# Patient Record
Sex: MALE | Race: NATIVE HAWAIIAN OR OTHER PACIFIC ISLANDER | NOT HISPANIC OR LATINO | URBAN - METROPOLITAN AREA
[De-identification: names, ages, dates, MRNs, and addresses within clinical notes are randomized per-mention and may not be internally consistent; named-entity substitution may affect disease eponyms.]

---

## 2022-09-27 ENCOUNTER — EMERGENCY (EMERGENCY)
Facility: HOSPITAL | Age: 82
LOS: 1 days | Discharge: HOME | End: 2022-09-27
Attending: EMERGENCY MEDICINE | Admitting: EMERGENCY MEDICINE
Payer: MEDICARE

## 2022-09-27 VITALS
OXYGEN SATURATION: 96 % | SYSTOLIC BLOOD PRESSURE: 121 MMHG | TEMPERATURE: 98 F | RESPIRATION RATE: 18 BRPM | DIASTOLIC BLOOD PRESSURE: 56 MMHG | HEART RATE: 80 BPM

## 2022-09-27 DIAGNOSIS — I10 ESSENTIAL (PRIMARY) HYPERTENSION: ICD-10-CM

## 2022-09-27 DIAGNOSIS — E11.9 TYPE 2 DIABETES MELLITUS WITHOUT COMPLICATIONS: ICD-10-CM

## 2022-09-27 DIAGNOSIS — Z20.822 CONTACT WITH AND (SUSPECTED) EXPOSURE TO COVID-19: ICD-10-CM

## 2022-09-27 DIAGNOSIS — R79.89 OTHER SPECIFIED ABNORMAL FINDINGS OF BLOOD CHEMISTRY: ICD-10-CM

## 2022-09-27 LAB
ALBUMIN SERPL ELPH-MCNC: 2.8 G/DL — LOW (ref 3.3–5)
ALBUMIN SERPL ELPH-MCNC: 3 G/DL — LOW (ref 3.3–5)
ALP SERPL-CCNC: 67 U/L — SIGNIFICANT CHANGE UP (ref 40–120)
ALP SERPL-CCNC: 69 U/L — SIGNIFICANT CHANGE UP (ref 40–120)
ALT FLD-CCNC: <5 U/L — LOW (ref 10–45)
ALT FLD-CCNC: SIGNIFICANT CHANGE UP (ref 10–45)
ANION GAP SERPL CALC-SCNC: 15 MMOL/L — SIGNIFICANT CHANGE UP (ref 5–17)
ANION GAP SERPL CALC-SCNC: 18 MMOL/L — HIGH (ref 5–17)
AST SERPL-CCNC: 13 U/L — SIGNIFICANT CHANGE UP (ref 10–40)
AST SERPL-CCNC: SIGNIFICANT CHANGE UP (ref 10–40)
BASE EXCESS BLDV CALC-SCNC: 4.2 MMOL/L — HIGH (ref -2–3)
BASOPHILS # BLD AUTO: 0.07 K/UL — SIGNIFICANT CHANGE UP (ref 0–0.2)
BASOPHILS NFR BLD AUTO: 0.9 % — SIGNIFICANT CHANGE UP (ref 0–2)
BILIRUB SERPL-MCNC: 0.5 MG/DL — SIGNIFICANT CHANGE UP (ref 0.2–1.2)
BILIRUB SERPL-MCNC: 0.5 MG/DL — SIGNIFICANT CHANGE UP (ref 0.2–1.2)
BUN SERPL-MCNC: 26 MG/DL — HIGH (ref 7–23)
BUN SERPL-MCNC: 27 MG/DL — HIGH (ref 7–23)
CA-I SERPL-SCNC: 1.55 MMOL/L — HIGH (ref 1.15–1.33)
CALCIUM SERPL-MCNC: 11.3 MG/DL — HIGH (ref 8.4–10.5)
CALCIUM SERPL-MCNC: 11.5 MG/DL — HIGH (ref 8.4–10.5)
CHLORIDE SERPL-SCNC: 101 MMOL/L — SIGNIFICANT CHANGE UP (ref 96–108)
CHLORIDE SERPL-SCNC: 103 MMOL/L — SIGNIFICANT CHANGE UP (ref 96–108)
CO2 BLDV-SCNC: 30.6 MMOL/L — HIGH (ref 22–26)
CO2 SERPL-SCNC: 23 MMOL/L — SIGNIFICANT CHANGE UP (ref 22–31)
CO2 SERPL-SCNC: 25 MMOL/L — SIGNIFICANT CHANGE UP (ref 22–31)
CREAT SERPL-MCNC: 2.09 MG/DL — HIGH (ref 0.5–1.3)
CREAT SERPL-MCNC: 2.1 MG/DL — HIGH (ref 0.5–1.3)
EGFR: 31 ML/MIN/1.73M2 — LOW
EGFR: 31 ML/MIN/1.73M2 — LOW
EOSINOPHIL # BLD AUTO: 0.41 K/UL — SIGNIFICANT CHANGE UP (ref 0–0.5)
EOSINOPHIL NFR BLD AUTO: 5.2 % — SIGNIFICANT CHANGE UP (ref 0–6)
GAS PNL BLDV: 138 MMOL/L — SIGNIFICANT CHANGE UP (ref 136–145)
GAS PNL BLDV: SIGNIFICANT CHANGE UP
GLUCOSE SERPL-MCNC: 114 MG/DL — HIGH (ref 70–99)
GLUCOSE SERPL-MCNC: 115 MG/DL — HIGH (ref 70–99)
HCO3 BLDV-SCNC: 29 MMOL/L — SIGNIFICANT CHANGE UP (ref 22–29)
HCT VFR BLD CALC: 31.9 % — LOW (ref 39–50)
HGB BLD-MCNC: 10.2 G/DL — LOW (ref 13–17)
LYMPHOCYTES # BLD AUTO: 0.56 K/UL — LOW (ref 1–3.3)
LYMPHOCYTES # BLD AUTO: 7 % — LOW (ref 13–44)
MANUAL SMEAR VERIFICATION: SIGNIFICANT CHANGE UP
MCHC RBC-ENTMCNC: 27.8 PG — SIGNIFICANT CHANGE UP (ref 27–34)
MCHC RBC-ENTMCNC: 32 GM/DL — SIGNIFICANT CHANGE UP (ref 32–36)
MCV RBC AUTO: 86.9 FL — SIGNIFICANT CHANGE UP (ref 80–100)
MONOCYTES # BLD AUTO: 0.97 K/UL — HIGH (ref 0–0.9)
MONOCYTES NFR BLD AUTO: 12.2 % — SIGNIFICANT CHANGE UP (ref 2–14)
NEUTROPHILS # BLD AUTO: 5.94 K/UL — SIGNIFICANT CHANGE UP (ref 1.8–7.4)
NEUTROPHILS NFR BLD AUTO: 73 % — SIGNIFICANT CHANGE UP (ref 43–77)
NEUTS BAND # BLD: 1.7 % — SIGNIFICANT CHANGE UP (ref 0–8)
PCO2 BLDV: 44 MMHG — SIGNIFICANT CHANGE UP (ref 42–55)
PH BLDV: 7.43 — SIGNIFICANT CHANGE UP (ref 7.32–7.43)
PLAT MORPH BLD: NORMAL — SIGNIFICANT CHANGE UP
PLATELET # BLD AUTO: 327 K/UL — SIGNIFICANT CHANGE UP (ref 150–400)
PO2 BLDV: 44 MMHG — SIGNIFICANT CHANGE UP (ref 25–45)
POTASSIUM BLDV-SCNC: 3.9 MMOL/L — SIGNIFICANT CHANGE UP (ref 3.5–5.1)
POTASSIUM SERPL-MCNC: SIGNIFICANT CHANGE UP (ref 3.5–5.3)
POTASSIUM SERPL-MCNC: SIGNIFICANT CHANGE UP (ref 3.5–5.3)
POTASSIUM SERPL-SCNC: SIGNIFICANT CHANGE UP (ref 3.5–5.3)
POTASSIUM SERPL-SCNC: SIGNIFICANT CHANGE UP (ref 3.5–5.3)
PROT SERPL-MCNC: 6.8 G/DL — SIGNIFICANT CHANGE UP (ref 6–8.3)
PROT SERPL-MCNC: 7.2 G/DL — SIGNIFICANT CHANGE UP (ref 6–8.3)
RBC # BLD: 3.67 M/UL — LOW (ref 4.2–5.8)
RBC # FLD: 14.4 % — SIGNIFICANT CHANGE UP (ref 10.3–14.5)
RBC BLD AUTO: NORMAL — SIGNIFICANT CHANGE UP
SAO2 % BLDV: 73.1 % — SIGNIFICANT CHANGE UP (ref 67–88)
SARS-COV-2 RNA SPEC QL NAA+PROBE: NEGATIVE — SIGNIFICANT CHANGE UP
SODIUM SERPL-SCNC: 142 MMOL/L — SIGNIFICANT CHANGE UP (ref 135–145)
SODIUM SERPL-SCNC: 143 MMOL/L — SIGNIFICANT CHANGE UP (ref 135–145)
WBC # BLD: 7.95 K/UL — SIGNIFICANT CHANGE UP (ref 3.8–10.5)
WBC # FLD AUTO: 7.95 K/UL — SIGNIFICANT CHANGE UP (ref 3.8–10.5)

## 2022-09-27 PROCEDURE — 84132 ASSAY OF SERUM POTASSIUM: CPT

## 2022-09-27 PROCEDURE — 99283 EMERGENCY DEPT VISIT LOW MDM: CPT

## 2022-09-27 PROCEDURE — 82803 BLOOD GASES ANY COMBINATION: CPT

## 2022-09-27 PROCEDURE — 93010 ELECTROCARDIOGRAM REPORT: CPT

## 2022-09-27 PROCEDURE — 80053 COMPREHEN METABOLIC PANEL: CPT

## 2022-09-27 PROCEDURE — 84295 ASSAY OF SERUM SODIUM: CPT

## 2022-09-27 PROCEDURE — 36415 COLL VENOUS BLD VENIPUNCTURE: CPT

## 2022-09-27 PROCEDURE — 82330 ASSAY OF CALCIUM: CPT

## 2022-09-27 PROCEDURE — 87635 SARS-COV-2 COVID-19 AMP PRB: CPT

## 2022-09-27 PROCEDURE — 85025 COMPLETE CBC W/AUTO DIFF WBC: CPT

## 2022-09-27 PROCEDURE — 93005 ELECTROCARDIOGRAM TRACING: CPT

## 2022-09-27 PROCEDURE — 99285 EMERGENCY DEPT VISIT HI MDM: CPT

## 2022-09-27 RX ORDER — SODIUM CHLORIDE 9 MG/ML
1000 INJECTION INTRAMUSCULAR; INTRAVENOUS; SUBCUTANEOUS ONCE
Refills: 0 | Status: COMPLETED | OUTPATIENT
Start: 2022-09-27 | End: 2022-09-27

## 2022-09-27 RX ADMIN — SODIUM CHLORIDE 1000 MILLILITER(S): 9 INJECTION INTRAMUSCULAR; INTRAVENOUS; SUBCUTANEOUS at 23:16

## 2022-09-27 NOTE — ED ADULT NURSE NOTE - ED CARDIAC CAPILLARY REFILL
----- Message from Tavo Bhatt sent at 5/4/2020  2:21 PM CDT -----  Contact: patient  Type: Needs Medical Advice  Who Called:  Patient  Symptoms (please be specific):    How long has patient had these symptoms:    Pharmacy name and phone #:    Best Call Back Number:   Additional Information: requesting paperwork be email to her regarding returning back to work on 05/11 and what she is being treated for [email or web address] requesting a call back patient would like virtual visit,first available was at 1:00pm on 05/11  
Letter printed for MD signature to be emailed.   
2 seconds or less

## 2022-09-28 VITALS
SYSTOLIC BLOOD PRESSURE: 118 MMHG | HEART RATE: 77 BPM | DIASTOLIC BLOOD PRESSURE: 60 MMHG | OXYGEN SATURATION: 97 % | RESPIRATION RATE: 19 BRPM

## 2022-09-28 NOTE — ED PROVIDER NOTE - PATIENT PORTAL LINK FT
You can access the FollowMyHealth Patient Portal offered by [facility] by registering at the following website: [email or web address]. By joining [facility] [facility]’s FollowMyHealth portal, you will also be able to view your health information using other applications (apps) compatible with our system.

## 2022-09-28 NOTE — ED PROVIDER NOTE - OBJECTIVE STATEMENT
81 yr old male, history of htn, dm, presents to the Emergency Department for abnormal lab result. sons at bedside translate.  pt saw pmd for routine visit, had blood work checked. was called today by office and told to come to the ed due to low kidney function. pt without symptoms. no hx of ckd. has never been told abnormal labs before.   no chest pain, cough, SOB, abd pain, n/v/d, urinary symptoms, change in urinary output, leg swelling. has been eating / drinking ok.

## 2022-09-28 NOTE — ED PROVIDER NOTE - PROGRESS NOTE DETAILS
creatinine 2.10 - no old for comparison  anemic to 10.2 / hct 31.9, again no old for comparison  ecg ok. labs otherwise ok.  rpt labs (w/o intervention, doing for K+ as initial hemolyzed) w creatinine 2.09.  given liter of fluids. pt without complaints. unclear if acute or ongoing issue but given no electrolyte abnormalities and no symptoms do not feel pt warrants admission at this time.     has pmd to follow up with - sons would prefer to get established w someone through [facility] so will give referral information.     All results reviewed with the patient verbally. Discharge plan and return precautions d/w pt who verbalized understanding and agrees with plan. All questions answered. Vitals WNL. Ready for d/c. creatinine 2.10 - no old for comparison  anemic to 10.2 / hct 31.9, again no old for comparison  ecg ok. labs otherwise ok.  rpt labs (w/o intervention, doing for K+ as initial hemolyzed) w creatinine 2.09.  given liter of fluids. pt without complaints. unclear if acute or ongoing issue but given no electrolyte abnormalities or other indication for emergent HD and no symptoms do not feel pt warrants admission at this time.     has pmd to follow up with - sons would prefer to get established w someone through [facility] so will give referral information.     All results reviewed with the patient verbally. Discharge plan and return precautions d/w pt who verbalized understanding and agrees with plan. All questions answered. Vitals WNL. Ready for d/c.

## 2022-09-28 NOTE — ED PROVIDER NOTE - NSFOLLOWUPINSTRUCTIONS_ED_ALL_ED_FT
You were seen in the Emergency Department for abnormal lab results.   Your CREATININE (kidney function) was elevated to 2.09  Your labs otherwise were reassuring to rule out an acute medical emergency.    Drink plenty of fluids. To stay hydrated.     Follow up with your primary care doctor within one week for continued evaluation.   Someone from our team will call you tomorrow to help you make an appointment with a new primary care doctor.    Return to the Emergency Department for persistent, worsening or new symptoms including severe chest pain, shortness of breath, difficulty breathing, nausea/vomiting, excessive sweating, or any other serious concerns.

## 2022-09-28 NOTE — ED PROVIDER NOTE - CLINICAL SUMMARY MEDICAL DECISION MAKING FREE TEXT BOX
pt w history of dm, htn, here after routine outpt labs showed "low kidney function". no symptoms. no known hx kidney disease  pt well appearing, normal vitals, exam unremarkable  unclear abnormal result. will check basic labs to check creatinine / gfr.   management per results.

## 2022-09-28 NOTE — ED PROVIDER NOTE - PHYSICAL EXAMINATION
Constitutional : Well appearing, non-toxic, no acute distress. awake, alert, oriented to person, place, time/situation.  Head : head normocephalic, atraumatic  EENMT : eyes clear bilaterally, PERRL, EOMI. airway patent. moist mucous membranes. neck supple.  Cardiac : Normal rate, regular rhythm. No murmur appreciated, no LE edema.  Resp : Breath sounds clear and equal bilaterally. Respirations even and unlabored.   Gastro : abdomen soft, nontender, nondistended. no rebound or guarding. no CVAT.  MSK :  range of motion is not limited, no muscle or joint tenderness  Back : No evidence of trauma. No spinal or CVA tenderness.  Vasc : Extremities warm and well perfused. 2+ radial and DP pulses. cap refill <2 seconds  Neuro : Alert and oriented, CNII-XII grossly intact, no focal deficits, no motor or sensory deficits.  Skin : Skin normal color for race, warm, dry and intact. No evidence of rash.  Psych : Alert and oriented to person, place, time/situation. normal mood and affect. no apparent risk to self or others.

## 2022-09-28 NOTE — ED PROVIDER NOTE - NS ED ATTENDING STATEMENT MOD
I have seen and examined this patient and fully participated in the care of this patient as the teaching attending.  The service was shared with the GABRIELLA.  I reviewed and verified the documentation and independently performed the documented:

## 2022-10-04 PROBLEM — Z00.00 ENCOUNTER FOR PREVENTIVE HEALTH EXAMINATION: Status: ACTIVE | Noted: 2022-10-04

## 2022-10-09 ENCOUNTER — INPATIENT (INPATIENT)
Facility: HOSPITAL | Age: 82
LOS: 4 days | Discharge: NURSING, REHAB OR LONG-TERM CARE | DRG: 291 | End: 2022-10-14
Attending: HOSPITALIST | Admitting: INTERNAL MEDICINE
Payer: MEDICARE

## 2022-10-09 VITALS
RESPIRATION RATE: 18 BRPM | WEIGHT: 89.95 LBS | HEIGHT: 61 IN | TEMPERATURE: 98 F | SYSTOLIC BLOOD PRESSURE: 144 MMHG | DIASTOLIC BLOOD PRESSURE: 74 MMHG | OXYGEN SATURATION: 98 % | HEART RATE: 77 BPM

## 2022-10-09 DIAGNOSIS — N18.30 CHRONIC KIDNEY DISEASE, STAGE 3 UNSPECIFIED: ICD-10-CM

## 2022-10-09 DIAGNOSIS — E11.9 TYPE 2 DIABETES MELLITUS WITHOUT COMPLICATIONS: ICD-10-CM

## 2022-10-09 DIAGNOSIS — R09.89 OTHER SPECIFIED SYMPTOMS AND SIGNS INVOLVING THE CIRCULATORY AND RESPIRATORY SYSTEMS: ICD-10-CM

## 2022-10-09 DIAGNOSIS — R53.1 WEAKNESS: ICD-10-CM

## 2022-10-09 DIAGNOSIS — E78.5 HYPERLIPIDEMIA, UNSPECIFIED: ICD-10-CM

## 2022-10-09 DIAGNOSIS — I10 ESSENTIAL (PRIMARY) HYPERTENSION: ICD-10-CM

## 2022-10-09 LAB
ALBUMIN SERPL ELPH-MCNC: 2.6 G/DL — LOW (ref 3.3–5)
ALP SERPL-CCNC: 72 U/L — SIGNIFICANT CHANGE UP (ref 40–120)
ALT FLD-CCNC: <5 U/L — LOW (ref 10–45)
ANION GAP SERPL CALC-SCNC: 8 MMOL/L — SIGNIFICANT CHANGE UP (ref 5–17)
APPEARANCE UR: CLEAR — SIGNIFICANT CHANGE UP
APPEARANCE UR: CLEAR — SIGNIFICANT CHANGE UP
AST SERPL-CCNC: 10 U/L — SIGNIFICANT CHANGE UP (ref 10–40)
BASOPHILS # BLD AUTO: 0.03 K/UL — SIGNIFICANT CHANGE UP (ref 0–0.2)
BASOPHILS NFR BLD AUTO: 0.4 % — SIGNIFICANT CHANGE UP (ref 0–2)
BILIRUB SERPL-MCNC: 0.4 MG/DL — SIGNIFICANT CHANGE UP (ref 0.2–1.2)
BILIRUB UR-MCNC: NEGATIVE — SIGNIFICANT CHANGE UP
BILIRUB UR-MCNC: NEGATIVE — SIGNIFICANT CHANGE UP
BUN SERPL-MCNC: 27 MG/DL — HIGH (ref 7–23)
CALCIUM SERPL-MCNC: 11.9 MG/DL — HIGH (ref 8.4–10.5)
CHLORIDE SERPL-SCNC: 102 MMOL/L — SIGNIFICANT CHANGE UP (ref 96–108)
CO2 SERPL-SCNC: 31 MMOL/L — SIGNIFICANT CHANGE UP (ref 22–31)
COLOR SPEC: YELLOW — SIGNIFICANT CHANGE UP
COLOR SPEC: YELLOW — SIGNIFICANT CHANGE UP
CREAT ?TM UR-MCNC: 19 MG/DL — SIGNIFICANT CHANGE UP
CREAT SERPL-MCNC: 2.09 MG/DL — HIGH (ref 0.5–1.3)
DIFF PNL FLD: NEGATIVE — SIGNIFICANT CHANGE UP
DIFF PNL FLD: NEGATIVE — SIGNIFICANT CHANGE UP
EGFR: 31 ML/MIN/1.73M2 — LOW
EOSINOPHIL # BLD AUTO: 0.05 K/UL — SIGNIFICANT CHANGE UP (ref 0–0.5)
EOSINOPHIL NFR BLD AUTO: 0.7 % — SIGNIFICANT CHANGE UP (ref 0–6)
GLUCOSE SERPL-MCNC: 124 MG/DL — HIGH (ref 70–99)
GLUCOSE UR QL: 250
GLUCOSE UR QL: 500
HCT VFR BLD CALC: 35.5 % — LOW (ref 39–50)
HGB BLD-MCNC: 11.1 G/DL — LOW (ref 13–17)
IMM GRANULOCYTES NFR BLD AUTO: 1.2 % — HIGH (ref 0–0.9)
KETONES UR-MCNC: NEGATIVE — SIGNIFICANT CHANGE UP
KETONES UR-MCNC: NEGATIVE — SIGNIFICANT CHANGE UP
LEUKOCYTE ESTERASE UR-ACNC: NEGATIVE — SIGNIFICANT CHANGE UP
LEUKOCYTE ESTERASE UR-ACNC: NEGATIVE — SIGNIFICANT CHANGE UP
LYMPHOCYTES # BLD AUTO: 0.87 K/UL — LOW (ref 1–3.3)
LYMPHOCYTES # BLD AUTO: 12.8 % — LOW (ref 13–44)
MCHC RBC-ENTMCNC: 27.3 PG — SIGNIFICANT CHANGE UP (ref 27–34)
MCHC RBC-ENTMCNC: 31.3 GM/DL — LOW (ref 32–36)
MCV RBC AUTO: 87.4 FL — SIGNIFICANT CHANGE UP (ref 80–100)
MONOCYTES # BLD AUTO: 1.09 K/UL — HIGH (ref 0–0.9)
MONOCYTES NFR BLD AUTO: 16 % — HIGH (ref 2–14)
NEUTROPHILS # BLD AUTO: 4.69 K/UL — SIGNIFICANT CHANGE UP (ref 1.8–7.4)
NEUTROPHILS NFR BLD AUTO: 68.9 % — SIGNIFICANT CHANGE UP (ref 43–77)
NITRITE UR-MCNC: NEGATIVE — SIGNIFICANT CHANGE UP
NITRITE UR-MCNC: NEGATIVE — SIGNIFICANT CHANGE UP
NRBC # BLD: 0 /100 WBCS — SIGNIFICANT CHANGE UP (ref 0–0)
OSMOLALITY UR: 313 MOSM/KG — SIGNIFICANT CHANGE UP (ref 300–900)
PH UR: 6 — SIGNIFICANT CHANGE UP (ref 5–8)
PH UR: 6.5 — SIGNIFICANT CHANGE UP (ref 5–8)
PLATELET # BLD AUTO: 360 K/UL — SIGNIFICANT CHANGE UP (ref 150–400)
POTASSIUM SERPL-MCNC: 3.8 MMOL/L — SIGNIFICANT CHANGE UP (ref 3.5–5.3)
POTASSIUM SERPL-SCNC: 3.8 MMOL/L — SIGNIFICANT CHANGE UP (ref 3.5–5.3)
POTASSIUM UR-SCNC: 15 MMOL/L — SIGNIFICANT CHANGE UP
PROT ?TM UR-MCNC: <4 MG/DL — SIGNIFICANT CHANGE UP (ref 0–12)
PROT SERPL-MCNC: 6.4 G/DL — SIGNIFICANT CHANGE UP (ref 6–8.3)
PROT UR-MCNC: NEGATIVE MG/DL — SIGNIFICANT CHANGE UP
PROT UR-MCNC: NEGATIVE MG/DL — SIGNIFICANT CHANGE UP
PROT/CREAT UR-RTO: SIGNIFICANT CHANGE UP (ref 0–0.2)
RBC # BLD: 4.06 M/UL — LOW (ref 4.2–5.8)
RBC # FLD: 14.6 % — HIGH (ref 10.3–14.5)
SARS-COV-2 RNA SPEC QL NAA+PROBE: NEGATIVE — SIGNIFICANT CHANGE UP
SODIUM SERPL-SCNC: 141 MMOL/L — SIGNIFICANT CHANGE UP (ref 135–145)
SODIUM UR-SCNC: 110 MMOL/L — SIGNIFICANT CHANGE UP
SP GR SPEC: 1.01 — SIGNIFICANT CHANGE UP (ref 1–1.03)
SP GR SPEC: 1.02 — SIGNIFICANT CHANGE UP (ref 1–1.03)
UROBILINOGEN FLD QL: 0.2 E.U./DL — SIGNIFICANT CHANGE UP
UROBILINOGEN FLD QL: 0.2 E.U./DL — SIGNIFICANT CHANGE UP
UUN UR-MCNC: 129 MG/DL — SIGNIFICANT CHANGE UP
WBC # BLD: 6.81 K/UL — SIGNIFICANT CHANGE UP (ref 3.8–10.5)
WBC # FLD AUTO: 6.81 K/UL — SIGNIFICANT CHANGE UP (ref 3.8–10.5)

## 2022-10-09 PROCEDURE — 99285 EMERGENCY DEPT VISIT HI MDM: CPT

## 2022-10-09 PROCEDURE — 72125 CT NECK SPINE W/O DYE: CPT | Mod: 26,MA

## 2022-10-09 PROCEDURE — 70450 CT HEAD/BRAIN W/O DYE: CPT | Mod: 26,MA

## 2022-10-09 PROCEDURE — 93010 ELECTROCARDIOGRAM REPORT: CPT

## 2022-10-09 PROCEDURE — 71045 X-RAY EXAM CHEST 1 VIEW: CPT | Mod: 26

## 2022-10-09 PROCEDURE — 73030 X-RAY EXAM OF SHOULDER: CPT | Mod: 26,RT

## 2022-10-09 RX ORDER — FUROSEMIDE 40 MG
20 TABLET ORAL ONCE
Refills: 0 | Status: COMPLETED | OUTPATIENT
Start: 2022-10-09 | End: 2022-10-09

## 2022-10-09 RX ORDER — SODIUM CHLORIDE 9 MG/ML
250 INJECTION INTRAMUSCULAR; INTRAVENOUS; SUBCUTANEOUS ONCE
Refills: 0 | Status: COMPLETED | OUTPATIENT
Start: 2022-10-09 | End: 2022-10-09

## 2022-10-09 RX ORDER — ENOXAPARIN SODIUM 100 MG/ML
30 INJECTION SUBCUTANEOUS EVERY 24 HOURS
Refills: 0 | Status: DISCONTINUED | OUTPATIENT
Start: 2022-10-09 | End: 2022-10-14

## 2022-10-09 RX ORDER — AMLODIPINE BESYLATE 2.5 MG/1
10 TABLET ORAL DAILY
Refills: 0 | Status: DISCONTINUED | OUTPATIENT
Start: 2022-10-09 | End: 2022-10-14

## 2022-10-09 RX ORDER — INFLUENZA VIRUS VACCINE 15; 15; 15; 15 UG/.5ML; UG/.5ML; UG/.5ML; UG/.5ML
0.7 SUSPENSION INTRAMUSCULAR ONCE
Refills: 0 | Status: DISCONTINUED | OUTPATIENT
Start: 2022-10-09 | End: 2022-10-14

## 2022-10-09 RX ADMIN — ENOXAPARIN SODIUM 30 MILLIGRAM(S): 100 INJECTION SUBCUTANEOUS at 19:24

## 2022-10-09 RX ADMIN — Medication 20 MILLIGRAM(S): at 16:29

## 2022-10-09 RX ADMIN — SODIUM CHLORIDE 250 MILLILITER(S): 9 INJECTION INTRAMUSCULAR; INTRAVENOUS; SUBCUTANEOUS at 12:57

## 2022-10-09 NOTE — ED ADULT TRIAGE NOTE - CHIEF COMPLAINT QUOTE
BIBEMS s/p mechanical trip and fall landing onto R hand. Denies precipitating sx prior to fall, head inj or LOC. R hand swollen and tender.

## 2022-10-09 NOTE — H&P ADULT - PROBLEM SELECTOR PLAN 5
states has DM but cannot recall DM medications  -- f/u A1C  -- monitor BG    **patient lives alone, SW consult needed    F: Oral intake  E: Replete electrolytes as needed for K<4 and Mg<2  N: DASH Diet    DVT PPX: lovenox  Dispo: pending generalized weakness workup    Case discussed with Dr. Barrios

## 2022-10-09 NOTE — H&P ADULT - HISTORY OF PRESENT ILLNESS
80yo noncompliant with medication, Cantonese/English speaking, former smoker male PMHx HTN, asthma (no hospitalizations/intubations) HLD, DM2 presented to [facility] ED after being found on the floor by his sons. Sons state he attempted to get up off of the couch where his legs became weak and he fell to the floor; no LOC, can remember the entire event, did not hit his head. Sons state this is the third time this has happened in the last three months (first hospitalization). Patient also complaining of b/l lower extremity (R>L) and right upper extremity edema and JULES x2-3 weeks. Patient currently denies chest pain, SOB, JULES, palpitations, dizziness, LOC, N/V/D, fever/chills/sick contact, diaphoresis, orthopnea/PND. In ER, VS 98F, HR 77, /74, RR 18, 98% on RA. Labs remarkable for negative UA, COVID negative, BNP elevated at 2307, trop negative x1, H/H 11.1/35.5, Calcium 11.9, albumin 2.6, BUN/Cr 27/2.09. Denies diagnosis of kidney disease; recently discharged from ER 9/27/22 for elevated creatinine at 2.09. CXR showing pleural effusion b/l. CT spine and head showing no fracture or hemorrhage/calvarial fracture. EKG sinus rhythm with PACs. Given Lasix 20mg IVP x1 in ED. Patient admitted to [facility] telemetry for workup of weakness and fall.

## 2022-10-09 NOTE — H&P ADULT - ASSESSMENT
82yo former smoker noncompliant male PMHx HTN, HLD, DM2, asthma presented to [facility] for leg weakness resulting in a fall; has fallen 3 times in past three months. Also with b/l LE edema and RUE edema x2-3 weeks. Admitted to [facility] telemetry for workup of weakness and fall.

## 2022-10-09 NOTE — ED PROVIDER NOTE - CLINICAL SUMMARY MEDICAL DECISION MAKING FREE TEXT BOX
Pt s/p weakness, fall, LE swelling, and intermittent SOB. Will evaluate for trauma/ICH. Will also obtain labs, EKG, imaging and likely admit. Dispo pending, workup and reeval. 82 y/o M, PMHx of DM and HTN, coming to ED for weakness and fall earlier today. As per sons at bedside, pt was found on the floor by his sons. He used life alert. As per son, pt has more swelling over LEs and ankles this past week. Pt normally walks with a walker. Pt denies any chest pain and SOB currently. As per son, pt does get intermittent SOB. He denies cough, fevers, chills, abdominal pain, and n/v/d. Pt also c/o of R shoulder pain and R hand swelling. Pt has no other complaints.  VS noted for mild hypoxia at 92, pedal edema  Ddx: ich/other spine injury, HF, other electrolyte abnormality  CXR w noted pleural effusions, elev BNP, given pedal edema, likely some component of HF, will given 20mg iv lasix, admit to cards, likely needs placement to [facility] for falls/weakness.

## 2022-10-09 NOTE — PATIENT PROFILE ADULT - FALL HARM RISK - HARM RISK INTERVENTIONS

## 2022-10-09 NOTE — H&P ADULT - PROBLEM SELECTOR PLAN 2
Denies hx of kidney disease, BUN/Cr on admission 27/2.09  -- discharged from ER 9/27/22 with BUN/Cr same as above and told to f/u with PMD  -- renally dose meds, avoid nephrotoxic agents  -- f/u urine lytes

## 2022-10-09 NOTE — H&P ADULT - NSHPLABSRESULTS_GEN_ALL_CORE
11.1   6.81  )-----------( 360      ( 09 Oct 2022 12:35 )             35.5       10-09    141  |  102  |  27<H>  ----------------------------<  124<H>  3.8   |  31  |  2.09<H>    Ca    11.9<H>      09 Oct 2022 12:35    TPro  6.4  /  Alb  2.6<L>  /  TBili  0.4  /  DBili  x   /  AST  10  /  ALT  <5<L>  /  AlkPhos  72  1009          CARDIAC MARKERS ( 09 Oct 2022 12:35 )  x     / 0.01 ng/mL / x     / x     / x            Urinalysis Basic - ( 09 Oct 2022 16:29 )    Color: Yellow / Appearance: Clear / S.015 / pH: x  Gluc: x / Ketone: NEGATIVE  / Bili: Negative / Urobili: 0.2 E.U./dL   Blood: x / Protein: NEGATIVE mg/dL / Nitrite: NEGATIVE   Leuk Esterase: NEGATIVE / RBC: x / WBC x   Sq Epi: x / Non Sq Epi: x / Bacteria: x

## 2022-10-09 NOTE — ED PROVIDER NOTE - OBJECTIVE STATEMENT
80 y/o M, PMHx of DM and HTN, coming to ED for weakness and fall earlier today. As per sons at bedside, pt was found on the floor by his sons. He used life alert. As per son, pt has more swelling over LEs and ankles this past week. Pt normally walks with a walker. Pt denies any chest pain and SOB. As per son, pt does get intermittent SOB. He denies cough, fevers, chills, abdominal pain, and n/v/d. Pt also c/o of R shoulder pain and R hand swelling. Pt has no other complaints. 80 y/o M, PMHx of DM and HTN, coming to ED for weakness and fall earlier today. As per sons at bedside, pt was found on the floor by his sons. He used life alert. As per son, pt has more swelling over LEs and ankles this past week. Pt normally walks with a walker. Pt denies any chest pain and SOB currently. As per son, pt does get intermittent SOB. He denies cough, fevers, chills, abdominal pain, and n/v/d. Pt also c/o of R shoulder pain and R hand swelling. Pt has no other complaints. Son at bedside states full code, pt lives alone.

## 2022-10-09 NOTE — H&P ADULT - PROBLEM SELECTOR PLAN 3
home meds: HCTZ 12.5mg, telmisartan 80mg, amlodipine 10mg, imdur 30mg, atenolol 25mg  -- c/w HCTZ 12.5mg, amlodipine 10mg, home meds: HCTZ 12.5mg, telmisartan 80mg, amlodipine 10mg, imdur 30mg, atenolol 25mg  -- c/w amlodipine 10mg  -- holding ARB and other HTN meds for creatinine levels

## 2022-10-09 NOTE — ED PROVIDER NOTE - PHYSICAL EXAMINATION
CONSTITUTIONAL: Awake, alert and in no apparent distress.  HEENT: Head is atraumatic. Eyes clear bilaterally, normal EOMI. Airway patent.  CARDIAC: Normal rate, regular rhythm.  Heart sounds S1, S2.   RESPIRATORY: Breath sounds clear and equal bilaterally. no tachypnea, respiratory distress.   GASTROINTESTINAL: Abdomen soft, non-tender, no guarding, distension.  MUSCULOSKELETAL: Spine appears normal, no midline spinal tenderness, range of motion is not limited, no muscle or joint tenderness. Swelling over R hand. No bony tenderness. Pain with ROM of R shoulder. Swelling over ankles bilat.   NEUROLOGICAL: Alert, no focal deficits, no motor or sensory deficits.  SKIN: Skin normal color for race, warm, dry and intact. No evidence of rash.  PSYCHIATRIC: Normal mood and affect. no apparent risk to self or others.

## 2022-10-09 NOTE — H&P ADULT - PROBLEM SELECTOR PLAN 1
presented after generalized leg weakness resulting in a fall, third fall in 3 months per son, denies LOC/head strike  -- CT spine/head negative for fracture, IC bleed  -- BNP elevated at 2307, trop negative x1, CXR with pleural effusion  -- b/l LLE edema (R>L), RUE edema x2-3 weeks  -- f/u RUE ultrasound for rule out VTE  -- follows with Dr. Chris Mart, obtain collateral in AM  -- TTE in AM presented after generalized leg weakness resulting in a fall, third fall in 3 months per son, denies LOC/head strike  -- CT spine/head negative for fracture, IC bleed  -- BNP elevated at 2307, trop negative x1, CXR with pleural effusion  -- b/l LLE edema (R>L), RUE edema x2-3 weeks  -- f/u RUE ultrasound for rule out VTE  -- s/p lasix 20mg IVP x1 in ED  -- started on lasix 20mg ____  -- follows with Dr. Chris Mart, obtain collateral in AM  -- TTE in AM presented after generalized leg weakness resulting in a fall, third fall in 3 months per son, denies LOC/head strike  -- CT spine/head negative for fracture, IC bleed  -- BNP elevated at 2307, trop negative x1, CXR with pleural effusion  -- JULES x2-3 weeks; uses son's home O2 occasionally  -- b/l LLE edema (R>L), RUE edema x2-3 weeks  -- f/u RUE ultrasound for rule out VTE  -- s/p lasix 20mg IVP x1 in ED  -- started on lasix 20mg ____  -- follows with Dr. Chris Mart, obtain collateral in AM  -- TTE in AM presented after generalized leg weakness resulting in a fall, third fall in 3 months per son, denies LOC/head strike  -- CT spine/head negative for fracture, IC bleed  -- BNP elevated at 2307, trop negative x1, CXR with pleural effusion  -- JULES x2-3 weeks; uses son's home O2 occasionally  -- b/l LLE edema (R>L), RUE edema x2-3 weeks  -- f/u RUE ultrasound for rule out VTE  -- s/p lasix 20mg IVP x1 in ED  -- reassess volume status 10/10, start lasix 20mg IV pending volume status  -- follows with Dr. Chris Mart, obtain collateral in AM  -- TTE in AM  -- pharmacy called on day of admission, please complete med rec in AM

## 2022-10-09 NOTE — ED ADULT NURSE NOTE - OBJECTIVE STATEMENT
81 y.o male reports to ED s/p fall after trying to get up from couch this morning. Pt states "my legs just feel so weak". Pt noted with b/l lower extremity pitting edema primarily in feet, right arm swelling from elbow to hand. Pt states he fell primarily on right side. Denies head trauma, anticoagulant use, dizziness, cp, sob. Pt states "my right arm is hard to move it hurts, I need to use my left to move it". No obvious shoulder deformity noted.

## 2022-10-10 ENCOUNTER — APPOINTMENT (OUTPATIENT)
Dept: INTERNAL MEDICINE | Facility: CLINIC | Age: 82
End: 2022-10-10

## 2022-10-10 DIAGNOSIS — J44.9 CHRONIC OBSTRUCTIVE PULMONARY DISEASE, UNSPECIFIED: ICD-10-CM

## 2022-10-10 DIAGNOSIS — I25.10 ATHEROSCLEROTIC HEART DISEASE OF NATIVE CORONARY ARTERY WITHOUT ANGINA PECTORIS: ICD-10-CM

## 2022-10-10 DIAGNOSIS — E87.70 FLUID OVERLOAD, UNSPECIFIED: ICD-10-CM

## 2022-10-10 LAB
A1C WITH ESTIMATED AVERAGE GLUCOSE RESULT: 5.8 % — HIGH (ref 4–5.6)
ANION GAP SERPL CALC-SCNC: 12 MMOL/L — SIGNIFICANT CHANGE UP (ref 5–17)
BUN SERPL-MCNC: 27 MG/DL — HIGH (ref 7–23)
CALCIUM SERPL-MCNC: 11.6 MG/DL — HIGH (ref 8.4–10.5)
CHLORIDE SERPL-SCNC: 100 MMOL/L — SIGNIFICANT CHANGE UP (ref 96–108)
CHOLEST SERPL-MCNC: 188 MG/DL — SIGNIFICANT CHANGE UP
CO2 SERPL-SCNC: 29 MMOL/L — SIGNIFICANT CHANGE UP (ref 22–31)
CREAT SERPL-MCNC: 2.11 MG/DL — HIGH (ref 0.5–1.3)
EGFR: 30 ML/MIN/1.73M2 — LOW
ESTIMATED AVERAGE GLUCOSE: 120 MG/DL — HIGH (ref 68–114)
GLUCOSE BLDC GLUCOMTR-MCNC: 138 MG/DL — HIGH (ref 70–99)
GLUCOSE BLDC GLUCOMTR-MCNC: 144 MG/DL — HIGH (ref 70–99)
GLUCOSE BLDC GLUCOMTR-MCNC: 76 MG/DL — SIGNIFICANT CHANGE UP (ref 70–99)
GLUCOSE SERPL-MCNC: 86 MG/DL — SIGNIFICANT CHANGE UP (ref 70–99)
HCT VFR BLD CALC: 34.7 % — LOW (ref 39–50)
HDLC SERPL-MCNC: 34 MG/DL — LOW
HGB BLD-MCNC: 10.5 G/DL — LOW (ref 13–17)
LIPID PNL WITH DIRECT LDL SERPL: 127 MG/DL — HIGH
MAGNESIUM SERPL-MCNC: 2 MG/DL — SIGNIFICANT CHANGE UP (ref 1.6–2.6)
MCHC RBC-ENTMCNC: 26.9 PG — LOW (ref 27–34)
MCHC RBC-ENTMCNC: 30.3 GM/DL — LOW (ref 32–36)
MCV RBC AUTO: 89 FL — SIGNIFICANT CHANGE UP (ref 80–100)
NON HDL CHOLESTEROL: 154 MG/DL — HIGH
NRBC # BLD: 0 /100 WBCS — SIGNIFICANT CHANGE UP (ref 0–0)
PHOSPHATE SERPL-MCNC: 3.9 MG/DL — SIGNIFICANT CHANGE UP (ref 2.5–4.5)
PLATELET # BLD AUTO: 351 K/UL — SIGNIFICANT CHANGE UP (ref 150–400)
POTASSIUM SERPL-MCNC: 3.7 MMOL/L — SIGNIFICANT CHANGE UP (ref 3.5–5.3)
POTASSIUM SERPL-SCNC: 3.7 MMOL/L — SIGNIFICANT CHANGE UP (ref 3.5–5.3)
RBC # BLD: 3.9 M/UL — LOW (ref 4.2–5.8)
RBC # FLD: 14.6 % — HIGH (ref 10.3–14.5)
SODIUM SERPL-SCNC: 141 MMOL/L — SIGNIFICANT CHANGE UP (ref 135–145)
TRIGL SERPL-MCNC: 133 MG/DL — SIGNIFICANT CHANGE UP
WBC # BLD: 6.35 K/UL — SIGNIFICANT CHANGE UP (ref 3.8–10.5)
WBC # FLD AUTO: 6.35 K/UL — SIGNIFICANT CHANGE UP (ref 3.8–10.5)

## 2022-10-10 PROCEDURE — 99223 1ST HOSP IP/OBS HIGH 75: CPT

## 2022-10-10 PROCEDURE — 93306 TTE W/DOPPLER COMPLETE: CPT | Mod: 26

## 2022-10-10 RX ORDER — GLUCAGON INJECTION, SOLUTION 0.5 MG/.1ML
1 INJECTION, SOLUTION SUBCUTANEOUS ONCE
Refills: 0 | Status: DISCONTINUED | OUTPATIENT
Start: 2022-10-10 | End: 2022-10-14

## 2022-10-10 RX ORDER — SODIUM CHLORIDE 9 MG/ML
1000 INJECTION, SOLUTION INTRAVENOUS
Refills: 0 | Status: DISCONTINUED | OUTPATIENT
Start: 2022-10-10 | End: 2022-10-14

## 2022-10-10 RX ORDER — FUROSEMIDE 40 MG
40 TABLET ORAL
Refills: 0 | Status: DISCONTINUED | OUTPATIENT
Start: 2022-10-10 | End: 2022-10-10

## 2022-10-10 RX ORDER — ATENOLOL 25 MG/1
25 TABLET ORAL DAILY
Refills: 0 | Status: DISCONTINUED | OUTPATIENT
Start: 2022-10-10 | End: 2022-10-13

## 2022-10-10 RX ORDER — DEXTROSE 50 % IN WATER 50 %
15 SYRINGE (ML) INTRAVENOUS ONCE
Refills: 0 | Status: DISCONTINUED | OUTPATIENT
Start: 2022-10-10 | End: 2022-10-14

## 2022-10-10 RX ORDER — ASPIRIN/CALCIUM CARB/MAGNESIUM 324 MG
81 TABLET ORAL DAILY
Refills: 0 | Status: DISCONTINUED | OUTPATIENT
Start: 2022-10-10 | End: 2022-10-14

## 2022-10-10 RX ORDER — INSULIN LISPRO 100/ML
VIAL (ML) SUBCUTANEOUS
Refills: 0 | Status: DISCONTINUED | OUTPATIENT
Start: 2022-10-10 | End: 2022-10-14

## 2022-10-10 RX ORDER — POTASSIUM CHLORIDE 20 MEQ
40 PACKET (EA) ORAL ONCE
Refills: 0 | Status: COMPLETED | OUTPATIENT
Start: 2022-10-10 | End: 2022-10-10

## 2022-10-10 RX ORDER — DEXTROSE 50 % IN WATER 50 %
25 SYRINGE (ML) INTRAVENOUS ONCE
Refills: 0 | Status: DISCONTINUED | OUTPATIENT
Start: 2022-10-10 | End: 2022-10-14

## 2022-10-10 RX ORDER — DEXTROSE 50 % IN WATER 50 %
12.5 SYRINGE (ML) INTRAVENOUS ONCE
Refills: 0 | Status: DISCONTINUED | OUTPATIENT
Start: 2022-10-10 | End: 2022-10-14

## 2022-10-10 RX ORDER — ATORVASTATIN CALCIUM 80 MG/1
20 TABLET, FILM COATED ORAL AT BEDTIME
Refills: 0 | Status: DISCONTINUED | OUTPATIENT
Start: 2022-10-10 | End: 2022-10-14

## 2022-10-10 RX ADMIN — ATORVASTATIN CALCIUM 20 MILLIGRAM(S): 80 TABLET, FILM COATED ORAL at 22:18

## 2022-10-10 RX ADMIN — Medication 40 MILLIEQUIVALENT(S): at 15:57

## 2022-10-10 RX ADMIN — ATENOLOL 25 MILLIGRAM(S): 25 TABLET ORAL at 15:57

## 2022-10-10 RX ADMIN — Medication 81 MILLIGRAM(S): at 15:56

## 2022-10-10 RX ADMIN — ENOXAPARIN SODIUM 30 MILLIGRAM(S): 100 INJECTION SUBCUTANEOUS at 18:51

## 2022-10-10 RX ADMIN — AMLODIPINE BESYLATE 10 MILLIGRAM(S): 2.5 TABLET ORAL at 06:02

## 2022-10-10 RX ADMIN — Medication 1 TABLET(S): at 15:57

## 2022-10-10 NOTE — PROGRESS NOTE ADULT - PROBLEM SELECTOR PLAN 1
presented after generalized leg weakness resulting in a fall, third fall in 3 months per son, denies LOC/head strike  -- CT spine/head negative for fracture, IC bleed  -- BNP elevated at 2307, trop negative x1, CXR with pleural effusion  -- JULES x2-3 weeks; uses son's home O2 occasionally  -- b/l LLE edema (R>L), RUE edema x2-3 weeks  -- f/u RUE ultrasound for rule out VTE  -- s/p lasix 20mg IVP x1 in ED  -- reassess volume status 10/10, start lasix 20mg IV pending volume status  -- follows with Dr. Chris Mart, obtain collateral in AM  -- TTE in AM  -- pharmacy called on day of admission, please complete med rec in AM p/w generalized leg weakness resulting in a fall. h/o multiple fall in the past 3 months per son at bedside  -CTH non cont 10/9: No intracranial hemorrhage or calvarial fracture.  -CT Spine non cont 10/9: No fracture. Cervical levoscoliosis with Grade 1 anterolisthesis of C7 on T1. Cervical spondylosis        -- b/l LLE edema (R>L), RUE edema x2-3 weeks  -- f/u RUE ultrasound for rule out VTE  -- s/p lasix 20mg IVP x1 in ED  -- reassess volume status 10/10, start lasix 20mg IV pending volume status  -- follows with Dr. Chris Mart, obtain collateral in AM  -- TTE in AM  -- pharmacy called on day of admission, please complete med rec in AM p/w generalized leg weakness resulting in a fall. h/o multiple fall in the past 3 months per son at bedside  -CTH non cont 10/9: No intracranial hemorrhage or calvarial fracture.  -CT Spine non cont 10/9: No fracture. Cervical levoscoliosis with Grade 1 anterolisthesis of C7 on T1. Cervical spondylosis      -- follows with Dr. Chris Mart, obtain collateral in AM  -- TTE in AM  -- pharmacy called on day of admission, please complete med rec in AM p/w generalized leg weakness resulting in a fall. h/o multiple fall in the past 3 months per son at bedside  -CTH non cont 10/9: No intracranial hemorrhage or calvarial fracture.  -CT Spine non cont 10/9: No fracture. Cervical levoscoliosis with Grade 1 anterolisthesis of C7 on T1. Cervical spondylosis  -PT consult

## 2022-10-10 NOTE — DIETITIAN INITIAL EVALUATION ADULT - NS FNS DIET ORDER
Diet, DASH/TLC:   Sodium & Cholesterol Restricted  Consistent Carbohydrate {No Snacks} (CSTCHO)  1200mL Fluid Restriction ([id number]) (10-09-22 @ 20:18)

## 2022-10-10 NOTE — PROGRESS NOTE ADULT - PROBLEM SELECTOR PLAN 5
states has DM but cannot recall DM medications  -- f/u A1C  -- monitor BG    **patient lives alone, SW consult needed    F: Oral intake  E: Replete electrolytes as needed for K<4 and Mg<2  N: DASH Diet    DVT PPX: lovenox  Dispo: pending generalized weakness workup    Case discussed with Dr. Barrios home med pravastatin 40mg  -- f/u lipid panel p/w 144/74   -Home meds confirmed with pt's pharmacy: Telmisartan 80mg daily, Amlodipine 10mda daily, Atenolol 25mg daily, imdur 30mg daily, Ranolazine 500mg BID  -CONT Amlodipine 10mg   -Restart Atenolol 25mg daily   -Will hold Telmisartan for now and will add back on if BP tolerate

## 2022-10-10 NOTE — PROGRESS NOTE ADULT - ASSESSMENT
80yo former smoker noncompliant male PMHx HTN, HLD, DM2, asthma presented to [facility] for leg weakness resulting in a fall; has fallen 3 times in past three months. Also with b/l LE edema and RUE edema x2-3 weeks. Admitted to [facility] telemetry for workup of weakness and fall. 80yo Cantonese/English speaking male, former smoker noncompliant with PMHx of HTN, HLD, DM2, COPD (uses son's O2), CKD stage 3 presented to [facility] c/o leg weakness resulting in a fall. Per son at bedside he has fallen 3 times in past three months. Also c/o b/l LE edema and RUE edema x2-3 weeks. Admitted to [facility] telemetry for workup of weakness resulting in a fall and ?overloaded.

## 2022-10-10 NOTE — DIETITIAN INITIAL EVALUATION ADULT - PERTINENT MEDS FT
MEDICATIONS  (STANDING):  amLODIPine   Tablet 10 milliGRAM(s) Oral daily  dextrose 5%. 1000 milliLiter(s) (100 mL/Hr) IV Continuous <Continuous>  dextrose 5%. 1000 milliLiter(s) (50 mL/Hr) IV Continuous <Continuous>  dextrose 50% Injectable 25 Gram(s) IV Push once  dextrose 50% Injectable 12.5 Gram(s) IV Push once  dextrose 50% Injectable 25 Gram(s) IV Push once  enoxaparin Injectable 30 milliGRAM(s) SubCutaneous every 24 hours  furosemide   Injectable 40 milliGRAM(s) IV Push two times a day  glucagon  Injectable 1 milliGRAM(s) IntraMuscular once  influenza  Vaccine (HIGH DOSE) 0.7 milliLiter(s) IntraMuscular once  insulin lispro (ADMELOG) corrective regimen sliding scale   SubCutaneous Before meals and at bedtime  potassium chloride    Tablet ER 40 milliEquivalent(s) Oral once    MEDICATIONS  (PRN):  dextrose Oral Gel 15 Gram(s) Oral once PRN Blood Glucose LESS THAN 70 milliGRAM(s)/deciliter

## 2022-10-10 NOTE — DIETITIAN INITIAL EVALUATION ADULT - OTHER CALCULATIONS
5'1''  pounds +-10%  Wt 89 pounds, BMI 16.8, %IBW79  IBW for EER as %IBW is less then 80%  Adjust for age and malnutrition; fluids per team   *Rec upper end of needs

## 2022-10-10 NOTE — DIETITIAN INITIAL EVALUATION ADULT - PERTINENT LABORATORY DATA
10-10    141  |  100  |  27<H>  ----------------------------<  86  3.7   |  29  |  2.11<H>    Ca    11.6<H>      10 Oct 2022 05:30  Phos  3.9     10-10  Mg     2.0     10-10    TPro  6.4  /  Alb  2.6<L>  /  TBili  0.4  /  DBili  x   /  AST  10  /  ALT  <5<L>  /  AlkPhos  72  10-09  POCT Blood Glucose.: 76 mg/dL (10-10-22 @ 08:26)  A1C with Estimated Average Glucose Result: 5.8 % (10-10-22 @ 05:30)

## 2022-10-10 NOTE — DIETITIAN INITIAL EVALUATION ADULT - OTHER INFO
82yo former smoker noncompliant male PMHx HTN, HLD, DM2, asthma presented to [facility] for leg weakness resulting in a fall; has fallen 3 times in past three months. Also with b/l LE edema and RUE edema x2-3 weeks. Admitted to [facility] telemetry for workup of weakness and fall. CT spine/head negative for fracture, IC bleed.    Pt seen sleeping this AM. Son present in room on 5UR. Reports pt only had some oatmeal this AM. Reports decreased PO intake x weeks PTA. Will sometimes take ONS, however not often. Usually likes chiesne food (tofu and veggies). Reports suspected wt loss d/t decreased PO; UBW 90 pounds, admit wt 89 pounds suggests wt loss of just 1 pound PTA. RD obtained bedscale today at 99 pounds. Full NFPE not feasible as pt sleeping and entirely covered with blankets, however present with wasting/loss to temples (moderate). Malnutrition note placed today, will adjust as able. No issues swallowing, reported issues with teeth and needing softer foods. RD to provide. Provided pt son with [facility] menu and suggested use of international menu as well. No pain. Dharmesh 19, No edema, No pressure ulcers. BM+ 10/9.   Labs: HDL 34, NON , , A1c 5.8%.   Please see below for nutritions recommendations. Recs made with team.

## 2022-10-10 NOTE — PROGRESS NOTE ADULT - PROBLEM SELECTOR PLAN 6
states has DM but cannot recall DM medications  -- f/u A1C  -- monitor BG    **patient lives alone, SW consult needed    F: Oral intake  E: Replete electrolytes as needed for K<4 and Mg<2  N: DASH Diet    DVT PPX: lovenox  Dispo: pending generalized weakness workup    Case discussed with Dr. Barrios Chol 188, , HDL 34,   -Per pt's pharmacy not on Pravastatin 40mg since last year  -START Atorvastatin 20mg qhs

## 2022-10-10 NOTE — PROGRESS NOTE ADULT - PROBLEM SELECTOR PLAN 3
home meds: HCTZ 12.5mg, telmisartan 80mg, amlodipine 10mg, imdur 30mg, atenolol 25mg  -- c/w amlodipine 10mg  -- holding ARB and other HTN meds for creatinine levels Denies hx of kidney disease, BUN/Cr on admission 27/2.09  -- discharged from ER 9/27/22 with BUN/Cr same as above and told to f/u with PMD  -- renally dose meds, avoid nephrotoxic agents  -- f/u urine lytes h/o CAD and denies any CP or SOB   -NST 4/2022 (from outpatient PMD): moderate size high intensity reversible defect at the inferior wall. Myocardial ischemia at the inferior wall   --Per outpatient cardiologist, Dr. Chris Mart, pt is having stable CAD and no intervention at that time and will continue with medical management.   -Home meds confirmed with pt's pharmacy: Aspirin 81mg PO   -CONT Aspirin 81mg

## 2022-10-10 NOTE — PROGRESS NOTE ADULT - SUBJECTIVE AND OBJECTIVE BOX
Interventional Cardiology PA Adult Progress Note      Subjective Assessment:  Pt seen and examine at bedside this morning without any complaint, stated legs and R arm swelling is improving. Denies CP, SOB, palpitations, fever/chills, n/v/d, abdominal pain, LE edema. All other ROS Negative except as per Subjective and HPI  	  MEDICATIONS:  amLODIPine   Tablet 10 milliGRAM(s) Oral daily  dextrose 50% Injectable 25 Gram(s) IV Push once  dextrose 50% Injectable 12.5 Gram(s) IV Push once  dextrose 50% Injectable 25 Gram(s) IV Push once  dextrose Oral Gel 15 Gram(s) Oral once PRN  glucagon  Injectable 1 milliGRAM(s) IntraMuscular once  insulin lispro (ADMELOG) corrective regimen sliding scale   SubCutaneous Before meals and at bedtime  aspirin enteric coated 81 milliGRAM(s) Oral daily  dextrose 5%. 1000 milliLiter(s) IV Continuous <Continuous>  dextrose 5%. 1000 milliLiter(s) IV Continuous <Continuous>  enoxaparin Injectable 30 milliGRAM(s) SubCutaneous every 24 hours  influenza  Vaccine (HIGH DOSE) 0.7 milliLiter(s) IntraMuscular once  multivitamin 1 Tablet(s) Oral daily  potassium chloride    Tablet ER 40 milliEquivalent(s) Oral once      	    [PHYSICAL EXAM:  TELEMETRY:  T(C): 37 (10-10-22 @ 13:43), Max: 37 (10-10-22 @ 13:43)  HR: 87 (10-10-22 @ 13:18) (73 - 93)  BP: 147/65 (10-10-22 @ 13:18) (124/58 - 169/72)  RR: 20 (10-10-22 @ 13:18) (17 - 21)  SpO2: 95% (10-10-22 @ 13:18) (94% - 100%)  Wt(kg): --  I&O's Summary    09 Oct 2022 07:01  -  10 Oct 2022 07:00  --------------------------------------------------------  IN: 0 mL / OUT: 875 mL / NET: -875 mL    10 Oct 2022 07:01  -  10 Oct 2022 14:09  --------------------------------------------------------  IN: 240 mL / OUT: 100 mL / NET: 140 mL        Tamez:  Central/PICC/Mid Line:                                         Appearance: Normal	  HEENT:   Normal oral mucosa, PERRL, EOMI	  Neck: Supple, + JVD/ - JVD; Carotid Bruit   Cardiovascular: Normal S1 S2, No JVD, No murmurs,   Respiratory: Lungs clear to auscultation/Decreased Breath Sounds/No Rales, Rhonchi, Wheezing	  Gastrointestinal:  Soft, Non-tender, + BS	  Skin: No rashes, No ecchymoses, No cyanosis  Extremities: Normal range of motion, No clubbing, cyanosis or edema  Vascular: Peripheral pulses palpable 2+ bilaterally  Neurologic: Non-focal  Psychiatry: A & O x 3, Mood & affect appropriate      	    ECG:  	  RADIOLOGY:   DIAGNOSTIC TESTING:  [ ] Echocardiogram:  [ ]  Catheterization:  [ ] Stress Test:    [ ] ADRIAN  OTHER: 	    LABS:	 	  CARDIAC MARKERS:                                  10.5   6.35  )-----------( 351      ( 10 Oct 2022 05:30 )             34.7     10-10    141  |  100  |  27<H>  ----------------------------<  86  3.7   |  29  |  2.11<H>    Ca    11.6<H>      10 Oct 2022 05:30  Phos  3.9     10-10  Mg     2.0     10-10    TPro  6.4  /  Alb  2.6<L>  /  TBili  0.4  /  DBili  x   /  AST  10  /  ALT  <5<L>  /  AlkPhos  72  10-09    proBNP:   Lipid Profile:   HgA1c:   TSH:       ASSESSMENT/PLAN: 	        DVT ppx:  Dispo:     Interventional Cardiology PA Adult Progress Note      Subjective Assessment:  Pt seen and examine at bedside this morning without any complaint, stated legs and R arm swelling is improving. Denies CP, SOB, palpitations, fever/chills, n/v/d, abdominal pain, LE edema. All other ROS Negative except as per Subjective and HPI  	  MEDICATIONS:  amLODIPine   Tablet 10 milliGRAM(s) Oral daily  dextrose 50% Injectable 25 Gram(s) IV Push once  dextrose 50% Injectable 12.5 Gram(s) IV Push once  dextrose 50% Injectable 25 Gram(s) IV Push once  dextrose Oral Gel 15 Gram(s) Oral once PRN  glucagon  Injectable 1 milliGRAM(s) IntraMuscular once  insulin lispro (ADMELOG) corrective regimen sliding scale   SubCutaneous Before meals and at bedtime  aspirin enteric coated 81 milliGRAM(s) Oral daily  dextrose 5%. 1000 milliLiter(s) IV Continuous <Continuous>  dextrose 5%. 1000 milliLiter(s) IV Continuous <Continuous>  enoxaparin Injectable 30 milliGRAM(s) SubCutaneous every 24 hours  influenza  Vaccine (HIGH DOSE) 0.7 milliLiter(s) IntraMuscular once  multivitamin 1 Tablet(s) Oral daily  potassium chloride    Tablet ER 40 milliEquivalent(s) Oral once      	    [PHYSICAL EXAM:  TELEMETRY:  T(C): 37 (10-10-22 @ 13:43), Max: 37 (10-10-22 @ 13:43)  HR: 87 (10-10-22 @ 13:18) (73 - 93)  BP: 147/65 (10-10-22 @ 13:18) (124/58 - 169/72)  RR: 20 (10-10-22 @ 13:18) (17 - 21)  SpO2: 95% (10-10-22 @ 13:18) (94% - 100%)  Wt(kg): --  I&O's Summary    09 Oct 2022 07:01  -  10 Oct 2022 07:00  --------------------------------------------------------  IN: 0 mL / OUT: 875 mL / NET: -875 mL    10 Oct 2022 07:01  -  10 Oct 2022 14:09  --------------------------------------------------------  IN: 240 mL / OUT: 100 mL / NET: 140 mL          Appearance: laying in bed in NAD	  HEENT: EOMI	  Neck: - JVD  Cardiovascular: Normal S1 S2, No JVD, No murmurs,   Respiratory: Lungs clear to auscultation, No Rales, no Rhonchi, or Wheezing	  Gastrointestinal:  Soft, Non-tender, + BSx 4 quads	  Skin: No rashes, No ecchymoses, No cyanosis  Extremities: Normal range of motion, No clubbing, cyanosis or edema  Vascular: Peripheral pulses palpable 2+ bilaterally  Neurologic: A& O x 3,  Psychiatry: Mood & affect appropriate    LABS:	 	                  10.5   6.35  )-----------( 351      ( 10 Oct 2022 05:30 )             34.7     10-10    141  |  100  |  27<H>  ----------------------------<  86  3.7   |  29  |  2.11<H>    Ca    11.6<H>      10 Oct 2022 05:30  Phos  3.9     10-10  Mg     2.0     10-10    TPro  6.4  /  Alb  2.6<L>  /  TBili  0.4  /  DBili  x   /  AST  10  /  ALT  <5<L>  /  AlkPhos  72  10-09

## 2022-10-10 NOTE — DIETITIAN INITIAL EVALUATION ADULT - CONTINUE CURRENT NUTRITION CARE PLAN
- Liberalize diet in setting of adv age and poor PO  - Add Soft/easy To Chew for ease at meals  - Ensure MAX x1-2/day: 150kcal/30gm prot per 1 can

## 2022-10-10 NOTE — PROGRESS NOTE ADULT - PROBLEM SELECTOR PLAN 2
Denies hx of kidney disease, BUN/Cr on admission 27/2.09  -- discharged from ER 9/27/22 with BUN/Cr same as above and told to f/u with PMD  -- renally dose meds, avoid nephrotoxic agents  -- f/u urine lytes -p/w JULES x 2 -3 weeks, uses son's home O2.   -BNP 2307, trop negative x1, CXR 10/9: Internal rotation, external rotation and scapular Y view of the right shoulder demonstrates elevation of the humeral head consistent with rotator cuff arthropathy. Degenerative change of the acromioclavicular joint. No fracture. No dislocation. Visualized lung is clear.  -s/p Lasix 20mg IV in the ED -p/w JULES x 2 -3 weeks, LLE edema R > L and RUE edema uses son's home O2.   -BNP 2307, trop negative x1, CXR 10/9: Internal rotation, external rotation and scapular Y view of the right shoulder demonstrates elevation of the humeral head consistent with rotator cuff arthropathy. Degenerative change of the acromioclavicular joint. No fracture. No dislocation. Visualized lung is clear.  -s/p Lasix 20mg IV in the ED  -Echo -p/w JULES x 2 -3 weeks and uses son's home O2, LLE edema R > L and RUE edema (now resolved)  -BNP 2307, trop negative x1, CXR 10/9: Internal rotation, external rotation and scapular Y view of the right shoulder demonstrates elevation of the humeral head consistent with rotator cuff arthropathy. Degenerative change of the acromioclavicular joint. No fracture. No dislocation. Visualized lung is clear.  -s/p Lasix 20mg IV in the ED  -ECHO 4/23/2022 (outpatient PMD, Dr. Odonnell): EF 59%, trace MR, mild TR, pulmonic regurgitation, Reversal of mitral inflow patterns suggest impaired left ventricular relaxation   -ECHO 10/10/2022:  EF 70-75%, Mild-to-mod TR, aortic sclerosis without significant stenosis, pHTN pASP 41mmHg.  -LLE edema R>L and RUE edema now resolved will hold off US duplex

## 2022-10-10 NOTE — PROGRESS NOTE ADULT - PROBLEM SELECTOR PLAN 4
home med pravastatin 40mg  -- f/u lipid panel home meds: HCTZ 12.5mg, telmisartan 80mg, amlodipine 10mg, imdur 30mg, atenolol 25mg  -- c/w amlodipine 10mg  -- holding ARB and other HTN meds for creatinine levels p/w BUN/Cr27/2.09  -Outpatient lab from PMD 9/23/2022 BUN/Cr 35/2.32  -BUN/Cr today 27/2.11  -Continue to trend  - renally dose meds, avoid nephrotoxic agents  - Urine lytes WNL

## 2022-10-11 LAB
ANION GAP SERPL CALC-SCNC: 8 MMOL/L — SIGNIFICANT CHANGE UP (ref 5–17)
BUN SERPL-MCNC: 26 MG/DL — HIGH (ref 7–23)
CALCIUM SERPL-MCNC: 11.1 MG/DL — HIGH (ref 8.4–10.5)
CHLORIDE SERPL-SCNC: 102 MMOL/L — SIGNIFICANT CHANGE UP (ref 96–108)
CO2 SERPL-SCNC: 29 MMOL/L — SIGNIFICANT CHANGE UP (ref 22–31)
CREAT SERPL-MCNC: 2 MG/DL — HIGH (ref 0.5–1.3)
EGFR: 33 ML/MIN/1.73M2 — LOW
GLUCOSE BLDC GLUCOMTR-MCNC: 111 MG/DL — HIGH (ref 70–99)
GLUCOSE BLDC GLUCOMTR-MCNC: 140 MG/DL — HIGH (ref 70–99)
GLUCOSE BLDC GLUCOMTR-MCNC: 163 MG/DL — HIGH (ref 70–99)
GLUCOSE BLDC GLUCOMTR-MCNC: 185 MG/DL — HIGH (ref 70–99)
GLUCOSE SERPL-MCNC: 115 MG/DL — HIGH (ref 70–99)
HCT VFR BLD CALC: 32.5 % — LOW (ref 39–50)
HGB BLD-MCNC: 10 G/DL — LOW (ref 13–17)
MAGNESIUM SERPL-MCNC: 2 MG/DL — SIGNIFICANT CHANGE UP (ref 1.6–2.6)
MCHC RBC-ENTMCNC: 27.1 PG — SIGNIFICANT CHANGE UP (ref 27–34)
MCHC RBC-ENTMCNC: 30.8 GM/DL — LOW (ref 32–36)
MCV RBC AUTO: 88.1 FL — SIGNIFICANT CHANGE UP (ref 80–100)
NRBC # BLD: 0 /100 WBCS — SIGNIFICANT CHANGE UP (ref 0–0)
PLATELET # BLD AUTO: 317 K/UL — SIGNIFICANT CHANGE UP (ref 150–400)
POTASSIUM SERPL-MCNC: 3.9 MMOL/L — SIGNIFICANT CHANGE UP (ref 3.5–5.3)
POTASSIUM SERPL-SCNC: 3.9 MMOL/L — SIGNIFICANT CHANGE UP (ref 3.5–5.3)
RBC # BLD: 3.69 M/UL — LOW (ref 4.2–5.8)
RBC # FLD: 14.7 % — HIGH (ref 10.3–14.5)
SODIUM SERPL-SCNC: 139 MMOL/L — SIGNIFICANT CHANGE UP (ref 135–145)
WBC # BLD: 6.69 K/UL — SIGNIFICANT CHANGE UP (ref 3.8–10.5)
WBC # FLD AUTO: 6.69 K/UL — SIGNIFICANT CHANGE UP (ref 3.8–10.5)

## 2022-10-11 PROCEDURE — 99233 SBSQ HOSP IP/OBS HIGH 50: CPT

## 2022-10-11 RX ORDER — POTASSIUM CHLORIDE 20 MEQ
20 PACKET (EA) ORAL ONCE
Refills: 0 | Status: COMPLETED | OUTPATIENT
Start: 2022-10-11 | End: 2022-10-11

## 2022-10-11 RX ORDER — ISOSORBIDE MONONITRATE 60 MG/1
30 TABLET, EXTENDED RELEASE ORAL DAILY
Refills: 0 | Status: DISCONTINUED | OUTPATIENT
Start: 2022-10-11 | End: 2022-10-14

## 2022-10-11 RX ADMIN — ATENOLOL 25 MILLIGRAM(S): 25 TABLET ORAL at 09:26

## 2022-10-11 RX ADMIN — Medication 1 TABLET(S): at 11:54

## 2022-10-11 RX ADMIN — ATORVASTATIN CALCIUM 20 MILLIGRAM(S): 80 TABLET, FILM COATED ORAL at 22:31

## 2022-10-11 RX ADMIN — ISOSORBIDE MONONITRATE 30 MILLIGRAM(S): 60 TABLET, EXTENDED RELEASE ORAL at 12:44

## 2022-10-11 RX ADMIN — AMLODIPINE BESYLATE 10 MILLIGRAM(S): 2.5 TABLET ORAL at 06:18

## 2022-10-11 RX ADMIN — Medication 20 MILLIEQUIVALENT(S): at 12:43

## 2022-10-11 RX ADMIN — ENOXAPARIN SODIUM 30 MILLIGRAM(S): 100 INJECTION SUBCUTANEOUS at 18:30

## 2022-10-11 RX ADMIN — Medication 1: at 17:12

## 2022-10-11 RX ADMIN — Medication 81 MILLIGRAM(S): at 11:54

## 2022-10-11 NOTE — PROGRESS NOTE ADULT - PROBLEM SELECTOR PLAN 5
p/w 144/74   -Home meds confirmed with pt's pharmacy: Telmisartan 80mg daily, Amlodipine 10mda daily, Atenolol 25mg daily, imdur 30mg daily, Ranolazine 500mg BID  -CONT Amlodipine 10mg, atenolol 25mg daily, imdur 30mg daily  -discontinue telmisartan in setting of CKD and controlled BPs

## 2022-10-11 NOTE — PHYSICAL THERAPY INITIAL EVALUATION ADULT - ADDITIONAL COMMENTS
Pt lives alone in a , 6 steps enter. Prior to admission, pt ambulated independently with cane, and rolling walker. Pt has multiple falls recently.

## 2022-10-11 NOTE — PROGRESS NOTE ADULT - PROBLEM SELECTOR PLAN 4
p/w BUN/Cr27/2.09  -Outpatient lab from PMD 9/23/2022 BUN/Cr 35/2.32  -BUN/Cr today 20/2.0  -Continue to trend  - renally dose meds, avoid nephrotoxic agents  - Urine lytes WNL

## 2022-10-11 NOTE — PROGRESS NOTE ADULT - PROBLEM SELECTOR PLAN 2
-p/w JULES x 2 -3 weeks and uses son's home O2, LLE edema R > L and RUE edema (now resolved)  -BNP 2307, trop negative x1, CXR 10/9: visualized lung clear  -s/p Lasix 20mg IV in the ED  -ECHO 10/10/2022:  EF 70-75%, Mild-to-mod TR, aortic sclerosis without significant stenosis, pHTN pASP 41mmHg.  -LLE edema R>L and RUE edema now resolved will hold off US duplex  -O2s at low 90s on RA. Exam reveals clear lungs. Likely 2/2 COPD

## 2022-10-11 NOTE — PROGRESS NOTE ADULT - PROBLEM SELECTOR PLAN 3
h/o CAD and denies any CP or SOB   -NST 4/2022 (from outpatient PMD): moderate size high intensity reversible defect at the inferior wall. Myocardial ischemia at the inferior wall   --Per outpatient cardiologist, Dr. Chris Mart, pt is having stable CAD and no intervention at that time and will continue with medical management.   -Home meds confirmed with pt's pharmacy: Aspirin 81mg PO daily, Ranexa 500mg BID, Imdur 30mg daily  -CONT w/ all of the above

## 2022-10-11 NOTE — PROGRESS NOTE ADULT - SUBJECTIVE AND OBJECTIVE BOX
Interventional Cardiology PA Adult Progress Note    Subjective Assessment:  Pt seen and examined at bedside this am and reports feeling well. Denies chills, CP, SOB, palpitations, orthopnea, n/v.    ROS Negative except as per Subjective and HPI  	  MEDICATIONS:  amLODIPine   Tablet 10 milliGRAM(s) Oral daily  ATENolol  Tablet 25 milliGRAM(s) Oral daily  isosorbide   mononitrate ER Tablet (IMDUR) 30 milliGRAM(s) Oral daily  atorvastatin 20 milliGRAM(s) Oral at bedtime  dextrose 50% Injectable 25 Gram(s) IV Push once  dextrose 50% Injectable 12.5 Gram(s) IV Push once  dextrose 50% Injectable 25 Gram(s) IV Push once  dextrose Oral Gel 15 Gram(s) Oral once PRN  glucagon  Injectable 1 milliGRAM(s) IntraMuscular once  insulin lispro (ADMELOG) corrective regimen sliding scale   SubCutaneous Before meals and at bedtime  aspirin enteric coated 81 milliGRAM(s) Oral daily  dextrose 5%. 1000 milliLiter(s) IV Continuous <Continuous>  dextrose 5%. 1000 milliLiter(s) IV Continuous <Continuous>  enoxaparin Injectable 30 milliGRAM(s) SubCutaneous every 24 hours  influenza  Vaccine (HIGH DOSE) 0.7 milliLiter(s) IntraMuscular once  multivitamin 1 Tablet(s) Oral daily      [PHYSICAL EXAM:  TELEMETRY:  T(C): 36.4 (10-11-22 @ 14:05), Max: 36.7 (10-10-22 @ 22:04)  HR: 56 (10-11-22 @ 13:08) (56 - 87)  BP: 149/65 (10-11-22 @ 13:08) (130/60 - 149/67)  RR: 19 (10-11-22 @ 11:56) (17 - 20)  SpO2: 94% (10-11-22 @ 13:08) (93% - 99%)  Wt(kg): --  I&O's Summary    10 Oct 2022 07:01  -  11 Oct 2022 07:00  --------------------------------------------------------  IN: 360 mL / OUT: 1050 mL / NET: -690 mL      Tamez:  Central/PICC/Mid Line:                                         Appearance: Normal	  HEENT:   Normal oral mucosa, PERRL, EOMI	  Neck: Supple, - JVD  Cardiovascular: Normal S1 S2, No murmurs  Respiratory: Lungs clear to auscultation/Decreased Breath Sounds/No Rales, Rhonchi, Wheezing	  Gastrointestinal:  Soft, Non-tender, + BS	  Skin: No rashes, No ecchymoses, No cyanosis  Extremities: Normal range of motion, No clubbing, cyanosis or edema  Psychiatry: A & O x 3, Mood & affect appropriate      	    ECG:  	  RADIOLOGY:   DIAGNOSTIC TESTING:  [ ] Echocardiogram:  [ ]  Catheterization:  [ ] Stress Test:    [ ] ADRIAN  OTHER: 	    LABS:	 	  CARDIAC MARKERS:                                  10.0   6.69  )-----------( 317      ( 11 Oct 2022 05:30 )             32.5     10-11    139  |  102  |  26<H>  ----------------------------<  115<H>  3.9   |  29  |  2.00<H>    Ca    11.1<H>      11 Oct 2022 05:30  Phos  3.9     10-10  Mg     2.0     10-11      proBNP:   Lipid Profile:   HgA1c:   TSH:       ASSESSMENT/PLAN: 	        DVT ppx:  Dispo:     Interventional Cardiology PA Adult Progress Note    Subjective Assessment:  Pt seen and examined at bedside this am and reports feeling well. Denies chills, CP, SOB, palpitations, orthopnea, n/v.    ROS Negative except as per Subjective and HPI  	  MEDICATIONS:  amLODIPine   Tablet 10 milliGRAM(s) Oral daily  ATENolol  Tablet 25 milliGRAM(s) Oral daily  isosorbide   mononitrate ER Tablet (IMDUR) 30 milliGRAM(s) Oral daily  atorvastatin 20 milliGRAM(s) Oral at bedtime  dextrose 50% Injectable 25 Gram(s) IV Push once  dextrose 50% Injectable 12.5 Gram(s) IV Push once  dextrose 50% Injectable 25 Gram(s) IV Push once  dextrose Oral Gel 15 Gram(s) Oral once PRN  glucagon  Injectable 1 milliGRAM(s) IntraMuscular once  insulin lispro (ADMELOG) corrective regimen sliding scale   SubCutaneous Before meals and at bedtime  aspirin enteric coated 81 milliGRAM(s) Oral daily  dextrose 5%. 1000 milliLiter(s) IV Continuous <Continuous>  dextrose 5%. 1000 milliLiter(s) IV Continuous <Continuous>  enoxaparin Injectable 30 milliGRAM(s) SubCutaneous every 24 hours  influenza  Vaccine (HIGH DOSE) 0.7 milliLiter(s) IntraMuscular once  multivitamin 1 Tablet(s) Oral daily      [PHYSICAL EXAM:  TELEMETRY:  T(C): 36.4 (10-11-22 @ 14:05), Max: 36.7 (10-10-22 @ 22:04)  HR: 56 (10-11-22 @ 13:08) (56 - 87)  BP: 149/65 (10-11-22 @ 13:08) (130/60 - 149/67)  RR: 19 (10-11-22 @ 11:56) (17 - 20)  SpO2: 94% (10-11-22 @ 13:08) (93% - 99%)  Wt(kg): --  I&O's Summary    10 Oct 2022 07:01  -  11 Oct 2022 07:00  --------------------------------------------------------  IN: 360 mL / OUT: 1050 mL / NET: -690 mL      Tamez:  Central/PICC/Mid Line:                                         Appearance: Normal	  HEENT:   Normal oral mucosa, PERRL, EOMI	  Neck: Supple, - JVD  Cardiovascular: Normal S1 S2, No murmurs  Respiratory: Lungs clear to auscultation. No Rales, Rhonchi, Wheezing	  Gastrointestinal:  Soft, Non-tender, + BS	  Skin: No rashes, No ecchymoses, No cyanosis  Extremities: Normal range of motion, No clubbing, cyanosis or edema  Psychiatry: A & O x 3, Mood & affect appropriate      	    ECG:  	  RADIOLOGY:   DIAGNOSTIC TESTING:  [ ] Echocardiogram:  [ ]  Catheterization:  [ ] Stress Test:    [ ] ADRIAN  OTHER: 	    LABS:	 	  CARDIAC MARKERS:                                  10.0   6.69  )-----------( 317      ( 11 Oct 2022 05:30 )             32.5     10-11    139  |  102  |  26<H>  ----------------------------<  115<H>  3.9   |  29  |  2.00<H>    Ca    11.1<H>      11 Oct 2022 05:30  Phos  3.9     10-10  Mg     2.0     10-11      proBNP:   Lipid Profile:   HgA1c:   TSH:       ASSESSMENT/PLAN: 	        DVT ppx:  Dispo:

## 2022-10-11 NOTE — PHYSICAL THERAPY INITIAL EVALUATION ADULT - NSPTDISCHREC_GEN_A_CORE
If pt declines rehab, pt will requires constant supervision from family for safety secondary pt requires 1 person assist with functional mobility./Sub-acute Rehab

## 2022-10-11 NOTE — PHYSICAL THERAPY INITIAL EVALUATION ADULT - PERTINENT HX OF CURRENT PROBLEM, REHAB EVAL
Pt is an 80 yo male presented to [facility] for leg weakness resulting in a fall; has fallen 3 times in past three months. Also with b/l LE edema and RUE edema x2-3 weeks. Admitted to [facility] telemetry for workup of weakness and fall.

## 2022-10-11 NOTE — PHYSICAL THERAPY INITIAL EVALUATION ADULT - GENERAL OBSERVATIONS, REHAB EVAL
Pt received semi supine in bed with +EKG, +hep-lock, on room air(however desat to 87%, RN place NC~2L back on, SPO2 recovered to 94%, Son present. Pt left as found, NAD, call bell in reach, +bed alarm, son present, RN Beverly awares.

## 2022-10-11 NOTE — OCCUPATIONAL THERAPY INITIAL EVALUATION ADULT - DIAGNOSIS, OT EVAL
Patient brought to [facility] s/p fall 2/2 generalized weakness, presents dyspneic during mobility and functional tasks, decreased overall strength, balance and activity tolerance impacting independence with functional activities and mobility.

## 2022-10-11 NOTE — PROGRESS NOTE ADULT - PROBLEM SELECTOR PLAN 6
Chol 188, , HDL 34,   -Per pt's pharmacy not on Pravastatin 40mg since last year  -C/w Atorvastatin 20mg qhs

## 2022-10-11 NOTE — PHYSICAL THERAPY INITIAL EVALUATION ADULT - IMPAIRED TRANSFERS: SIT/STAND, REHAB EVAL
07/19/19 1509   Wound Leg Lower Right   Date First Assessed/Time First Assessed: 08/17/18 1436   POA: Yes  Wound Type: Venous  Location: Leg Lower  Wound Description (Optional): 16  Orientation: Right   Dressing Status  Clean, dry, and intact   Dressing Type    (Xeroform, unna boot bilaterally)   Non-Pressure Injury Full thickness (subcut/muscle)   Wound Length (cm) 0.8 cm   Wound Width (cm) 0.8 cm   Wound Depth (cm) 0.1   Wound Surface area (cm^2) 0.64 cm^2   Change in Wound Size % -156   Condition of Base Granulation   Tissue Type Percent Red 100   Drainage Amount  Small    Drainage Color Sanguinous   Wound Odor None   Cleansing and Cleansing Agents  Soap and water       Patient is currently taking Plavix and Aspirin 81 mg. impaired balance/impaired postural control/decreased strength

## 2022-10-11 NOTE — OCCUPATIONAL THERAPY INITIAL EVALUATION ADULT - ADDITIONAL COMMENTS
Patient primarily Cantonese speaking, son present to assist translation. As per son, patient lives alone in an elevator access apartment building with 6 KITTY. Patient was independent with ADL's, functional mobility with SC however the past couple of weeks noted with increasing weakness requiring RW to mobilize. Patient is R hand dominant and owns a bath tub shower however patient was self bathing outside. Patient's family has been assisting patient with food, grocery shopping and laundry.

## 2022-10-11 NOTE — PROGRESS NOTE ADULT - PROBLEM SELECTOR PLAN 1
p/w generalized leg weakness resulting in a fall. h/o multiple fall in the past 3 months per son at bedside  -CTH non cont 10/9: No intracranial hemorrhage or calvarial fracture.  -CT Spine non cont 10/9: No fracture. Cervical levoscoliosis with Grade 1 anterolisthesis of C7 on T1. Cervical spondylosis  -PT recommending [facility], currently awaiting acceptance

## 2022-10-11 NOTE — PHYSICAL THERAPY INITIAL EVALUATION ADULT - GAIT DEVIATIONS NOTED, PT EVAL
slightly unsteady gait, no lose of balance, decreased heel strike and hip flexion bilaterally./decreased angi/increased time in double stance/decreased step length

## 2022-10-11 NOTE — OCCUPATIONAL THERAPY INITIAL EVALUATION ADULT - GENERAL OBSERVATIONS, REHAB EVAL
Patient son present. Patient primarily Cantonese speaking however able to communicate basic English. Patient A&Ox2, agreeable and tolerated session fairly. Patient received seated at EOB +heplock IV, + 2 L O2 via NC, +NAD.

## 2022-10-11 NOTE — OCCUPATIONAL THERAPY INITIAL EVALUATION ADULT - MANUAL MUSCLE TESTING RESULTS, REHAB EVAL
BUE/BLE at least 3+/5 grossly except b/l shoulder flexion 2+/5, based on functional mobility assessment against gravity.

## 2022-10-11 NOTE — PHYSICAL THERAPY INITIAL EVALUATION ADULT - IMPAIRMENTS FOUND, PT EVAL
aerobic capacity/endurance/gait, locomotion, and balance/gross motor/muscle strength/poor safety awareness/posture/ROM

## 2022-10-11 NOTE — OCCUPATIONAL THERAPY INITIAL EVALUATION ADULT - PERTINENT HX OF CURRENT PROBLEM, REHAB EVAL
80yo noncompliant with medication, Cantonese/English speaking, former smoker male PMHx HTN, asthma (no hospitalizations/intubations) HLD, DM2 presented to [facility] ED after being found on the floor by his sons. Sons state he attempted to get up off of the couch where his legs became weak and he fell to the floor; no LOC, can remember the entire event, did not hit his head. Sons state this is the third time this has happened in the last three months (first hospitalization). Patient also complaining of b/l lower extremity (R>L) and right upper extremity edema and JULES x2-3 weeks. Patient currently denies chest pain, SOB, JULES, palpitations, dizziness, LOC, N/V/D, fever/chills/sick contact, diaphoresis, orthopnea/PND. In ER, VS 98F, HR 77, /74, RR 18, 98% on RA. Labs remarkable for negative UA, COVID negative, BNP elevated at 2307, trop negative x1, H/H 11.1/35.5, Calcium 11.9, albumin 2.6, BUN/Cr 27/2.09. Denies diagnosis of kidney disease; recently discharged from ER 9/27/22 for elevated creatinine at 2.09. CXR showing pleural effusion b/l. CT spine and head showing no fracture or hemorrhage/calvarial fracture. EKG sinus rhythm with PACs. Given Lasix 20mg IVP x1 in ED. Patient admitted to [facility] telemetry for workup of weakness and fall.

## 2022-10-11 NOTE — PROGRESS NOTE ADULT - ASSESSMENT
82yo Cantonese/English speaking male, former smoker noncompliant with PMHx of HTN, HLD, DM2, COPD (uses son's O2), CKD stage 3 presented to [facility] ED after leg weakness and fall. Pt has had multiple falls this past month. Pt was admitted to [facility] as course was thought to be c/b volume overload, but echo did not yield any signs of CHF. Pt now pending [facility] acceptance

## 2022-10-12 LAB
GLUCOSE BLDC GLUCOMTR-MCNC: 114 MG/DL — HIGH (ref 70–99)
GLUCOSE BLDC GLUCOMTR-MCNC: 117 MG/DL — HIGH (ref 70–99)
GLUCOSE BLDC GLUCOMTR-MCNC: 119 MG/DL — HIGH (ref 70–99)
GLUCOSE BLDC GLUCOMTR-MCNC: 156 MG/DL — HIGH (ref 70–99)

## 2022-10-12 PROCEDURE — 99233 SBSQ HOSP IP/OBS HIGH 50: CPT

## 2022-10-12 RX ORDER — RANOLAZINE 500 MG/1
500 TABLET, FILM COATED, EXTENDED RELEASE ORAL
Refills: 0 | Status: DISCONTINUED | OUTPATIENT
Start: 2022-10-12 | End: 2022-10-14

## 2022-10-12 RX ORDER — POLYETHYLENE GLYCOL 3350 17 G/17G
17 POWDER, FOR SOLUTION ORAL ONCE
Refills: 0 | Status: COMPLETED | OUTPATIENT
Start: 2022-10-12 | End: 2022-10-12

## 2022-10-12 RX ADMIN — ENOXAPARIN SODIUM 30 MILLIGRAM(S): 100 INJECTION SUBCUTANEOUS at 18:41

## 2022-10-12 RX ADMIN — ATORVASTATIN CALCIUM 20 MILLIGRAM(S): 80 TABLET, FILM COATED ORAL at 22:20

## 2022-10-12 RX ADMIN — AMLODIPINE BESYLATE 10 MILLIGRAM(S): 2.5 TABLET ORAL at 05:48

## 2022-10-12 RX ADMIN — Medication 81 MILLIGRAM(S): at 12:22

## 2022-10-12 RX ADMIN — ATENOLOL 25 MILLIGRAM(S): 25 TABLET ORAL at 08:47

## 2022-10-12 RX ADMIN — RANOLAZINE 500 MILLIGRAM(S): 500 TABLET, FILM COATED, EXTENDED RELEASE ORAL at 18:41

## 2022-10-12 RX ADMIN — Medication 1: at 12:25

## 2022-10-12 RX ADMIN — ISOSORBIDE MONONITRATE 30 MILLIGRAM(S): 60 TABLET, EXTENDED RELEASE ORAL at 12:22

## 2022-10-12 RX ADMIN — Medication 1 TABLET(S): at 12:22

## 2022-10-12 RX ADMIN — POLYETHYLENE GLYCOL 3350 17 GRAM(S): 17 POWDER, FOR SOLUTION ORAL at 19:29

## 2022-10-12 NOTE — PROGRESS NOTE ADULT - PROBLEM SELECTOR PLAN 5
p/w 144/74   -Home meds confirmed with pt's pharmacy: Telmisartan 80mg daily, Amlodipine 10mda daily, Atenolol 25mg daily, imdur 30mg daily  -CONT Amlodipine 10mg, atenolol 25mg daily, imdur 30mg daily  -discontinue telmisartan in setting of CKD and controlled BPs

## 2022-10-12 NOTE — PROGRESS NOTE ADULT - ASSESSMENT
80yo Cantonese/English speaking male, former smoker noncompliant with PMHx of HTN, HLD, DM2, COPD (uses son's O2), CKD stage 3 presented to [facility] ED after leg weakness and fall. Pt has had multiple falls this past month. Pt was admitted to [facility] as course was thought to be c/b volume overload, but echo did not yield any signs of CHF. Pt now pending [facility] acceptance

## 2022-10-12 NOTE — PROGRESS NOTE ADULT - PROBLEM SELECTOR PLAN 4
p/w BUN/Cr27/2.09  -Outpatient lab from PMD 9/23/2022 BUN/Cr 35/2.32  -BUN/Cr 10/11 20/2.0  -continue lab holiday as pt cleared for dc, awaiting camille  -Continue to trend  - renally dose meds, avoid nephrotoxic agents  - Urine lytes WNL

## 2022-10-12 NOTE — PROGRESS NOTE ADULT - SUBJECTIVE AND OBJECTIVE BOX
Interventional Cardiology PA Adult Progress Note    Subjective Assessment:  Pt seen and examined at bedside this am and reports feeling well. Denies chills, CP, SOB, palpitations, orthopnea, n/v.    ROS Negative except as per Subjective and HPI  	  MEDICATIONS:  amLODIPine   Tablet 10 milliGRAM(s) Oral daily  ATENolol  Tablet 25 milliGRAM(s) Oral daily  isosorbide   mononitrate ER Tablet (IMDUR) 30 milliGRAM(s) Oral daily  ranolazine 500 milliGRAM(s) Oral two times a day  atorvastatin 20 milliGRAM(s) Oral at bedtime  dextrose 50% Injectable 25 Gram(s) IV Push once  dextrose 50% Injectable 12.5 Gram(s) IV Push once  dextrose 50% Injectable 25 Gram(s) IV Push once  dextrose Oral Gel 15 Gram(s) Oral once PRN  glucagon  Injectable 1 milliGRAM(s) IntraMuscular once  insulin lispro (ADMELOG) corrective regimen sliding scale   SubCutaneous Before meals and at bedtime  aspirin enteric coated 81 milliGRAM(s) Oral daily  dextrose 5%. 1000 milliLiter(s) IV Continuous <Continuous>  dextrose 5%. 1000 milliLiter(s) IV Continuous <Continuous>  enoxaparin Injectable 30 milliGRAM(s) SubCutaneous every 24 hours  influenza  Vaccine (HIGH DOSE) 0.7 milliLiter(s) IntraMuscular once  multivitamin 1 Tablet(s) Oral daily        [PHYSICAL EXAM:  TELEMETRY:  T(C): 36.1 (10-12-22 @ 14:16), Max: 36.6 (10-11-22 @ 22:29)  HR: 54 (10-12-22 @ 12:20) (53 - 56)  BP: 147/68 (10-12-22 @ 12:20) (121/68 - 147/68)  RR: 16 (10-12-22 @ 12:20) (16 - 18)  SpO2: 91% (10-12-22 @ 12:20) (91% - 97%)  Wt(kg): --  I&O's Summary    11 Oct 2022 07:01  -  12 Oct 2022 07:00  --------------------------------------------------------  IN: 0 mL / OUT: 475 mL / NET: -475 mL    12 Oct 2022 07:01  -  12 Oct 2022 17:39  --------------------------------------------------------  IN: 420 mL / OUT: 0 mL / NET: 420 mL        Tamez:  Central/PICC/Mid Line:                                         Appearance: Normal	  HEENT:   Normal oral mucosa, PERRL, EOMI	  Neck: Supple, - JVD  Cardiovascular: Normal S1 S2, No murmurs  Respiratory: Lungs clear to auscultation. Rhonchi, Wheezing, rales  Gastrointestinal:  Soft, Non-tender, + BS	  Skin: No rashes, No ecchymoses, No cyanosis  Extremities: Normal range of motion, No clubbing, cyanosis or edema  Psychiatry: Mood & affect appropriate      	    ECG:  	  RADIOLOGY:   DIAGNOSTIC TESTING:  [ ] Echocardiogram:  [ ]  Catheterization:  [ ] Stress Test:    [ ] ADRIAN  OTHER: 	    LABS:	 	  CARDIAC MARKERS:                                  10.0   6.69  )-----------( 317      ( 11 Oct 2022 05:30 )             32.5     10-11    139  |  102  |  26<H>  ----------------------------<  115<H>  3.9   |  29  |  2.00<H>    Ca    11.1<H>      11 Oct 2022 05:30  Mg     2.0     10-11      proBNP:   Lipid Profile:   HgA1c:   TSH:       ASSESSMENT/PLAN: 	        DVT ppx:  Dispo:

## 2022-10-13 ENCOUNTER — TRANSCRIPTION ENCOUNTER (OUTPATIENT)
Age: 82
End: 2022-10-13

## 2022-10-13 LAB
GLUCOSE BLDC GLUCOMTR-MCNC: 103 MG/DL — HIGH (ref 70–99)
GLUCOSE BLDC GLUCOMTR-MCNC: 135 MG/DL — HIGH (ref 70–99)
GLUCOSE BLDC GLUCOMTR-MCNC: 145 MG/DL — HIGH (ref 70–99)
GLUCOSE BLDC GLUCOMTR-MCNC: 197 MG/DL — HIGH (ref 70–99)

## 2022-10-13 PROCEDURE — 99233 SBSQ HOSP IP/OBS HIGH 50: CPT

## 2022-10-13 RX ADMIN — Medication 1: at 12:30

## 2022-10-13 RX ADMIN — ENOXAPARIN SODIUM 30 MILLIGRAM(S): 100 INJECTION SUBCUTANEOUS at 17:45

## 2022-10-13 RX ADMIN — ISOSORBIDE MONONITRATE 30 MILLIGRAM(S): 60 TABLET, EXTENDED RELEASE ORAL at 15:16

## 2022-10-13 RX ADMIN — RANOLAZINE 500 MILLIGRAM(S): 500 TABLET, FILM COATED, EXTENDED RELEASE ORAL at 05:50

## 2022-10-13 RX ADMIN — Medication 81 MILLIGRAM(S): at 11:51

## 2022-10-13 RX ADMIN — RANOLAZINE 500 MILLIGRAM(S): 500 TABLET, FILM COATED, EXTENDED RELEASE ORAL at 17:45

## 2022-10-13 RX ADMIN — ATORVASTATIN CALCIUM 20 MILLIGRAM(S): 80 TABLET, FILM COATED ORAL at 21:13

## 2022-10-13 RX ADMIN — Medication 1 TABLET(S): at 11:51

## 2022-10-13 RX ADMIN — ATENOLOL 25 MILLIGRAM(S): 25 TABLET ORAL at 05:50

## 2022-10-13 RX ADMIN — AMLODIPINE BESYLATE 10 MILLIGRAM(S): 2.5 TABLET ORAL at 06:39

## 2022-10-13 NOTE — PROGRESS NOTE ADULT - PROBLEM SELECTOR PLAN 1
p/w generalized leg weakness resulting in a fall. h/o multiple fall in the past 3 months per son at bedside  -CTH non cont 10/9: No intracranial hemorrhage or calvarial fracture.  -CT Spine non cont 10/9: No fracture. Cervical levoscoliosis with Grade 1 anterolisthesis of C7 on T1. Cervical spondylosis  -PT recommending SOHAIL, currently awaiting authorization

## 2022-10-13 NOTE — PROGRESS NOTE ADULT - SUBJECTIVE AND OBJECTIVE BOX
OVERNIGHT EVENTS:  No acute events overnight.    SUBJECTIVE / INTERVAL HPI: Patient seen and examined at bedside.    Denies CP, SOB, dizziness/lightheadedness, palpitations    12 point ROS otherwise negative    VITAL SIGNS:  Vital Signs Last 24 Hrs  T(C): 36.4 (13 Oct 2022 14:29), Max: 36.4 (13 Oct 2022 10:26)  T(F): 97.5 (13 Oct 2022 14:29), Max: 97.6 (13 Oct 2022 10:26)  HR: 46 (13 Oct 2022 14:00) (45 - 67)  BP: 113/53 (13 Oct 2022 14:00) (89/45 - 144/65)  BP(mean): 73 (13 Oct 2022 14:00) (73 - 81)  RR: 17 (13 Oct 2022 14:00) (16 - 18)  SpO2: 96% (13 Oct 2022 14:00) (92% - 96%)    Parameters below as of 13 Oct 2022 14:00  Patient On (Oxygen Delivery Method): nasal cannula  O2 Flow (L/min): 2        I&O's Summary    12 Oct 2022 07:01  -  13 Oct 2022 07:00  --------------------------------------------------------  IN: 540 mL / OUT: 150 mL / NET: 390 mL    13 Oct 2022 07:01  -  13 Oct 2022 17:12  --------------------------------------------------------  IN: 240 mL / OUT: 0 mL / NET: 240 mL          PHYSICAL EXAM:    General: sitting up in bed, NAD  HEENT: conjunctiva clear; MMM  Neck: supple, no JVD  Cardiovascular: RRR, no murmurs  Respiratory: CTA B/L  Gastrointestinal: soft, NT/ND, +BS  Extremities: WWP, no edema or cyanosis  Vascular: Peripheral pulses palpable 2+ bilaterally/ carotid 2+ b/l, no bruits; radial 2+ b/l; femoral 2+ b/l no bruits; DP/PT 2+ b/l  Neurological: AAOx3, no focal deficits    MEDICATIONS:  MEDICATIONS  (STANDING):  amLODIPine   Tablet 10 milliGRAM(s) Oral daily  aspirin enteric coated 81 milliGRAM(s) Oral daily  atorvastatin 20 milliGRAM(s) Oral at bedtime  dextrose 5%. 1000 milliLiter(s) (100 mL/Hr) IV Continuous <Continuous>  dextrose 5%. 1000 milliLiter(s) (50 mL/Hr) IV Continuous <Continuous>  dextrose 50% Injectable 25 Gram(s) IV Push once  dextrose 50% Injectable 12.5 Gram(s) IV Push once  dextrose 50% Injectable 25 Gram(s) IV Push once  enoxaparin Injectable 30 milliGRAM(s) SubCutaneous every 24 hours  glucagon  Injectable 1 milliGRAM(s) IntraMuscular once  influenza  Vaccine (HIGH DOSE) 0.7 milliLiter(s) IntraMuscular once  insulin lispro (ADMELOG) corrective regimen sliding scale   SubCutaneous Before meals and at bedtime  isosorbide   mononitrate ER Tablet (IMDUR) 30 milliGRAM(s) Oral daily  multivitamin 1 Tablet(s) Oral daily  ranolazine 500 milliGRAM(s) Oral two times a day    MEDICATIONS  (PRN):  dextrose Oral Gel 15 Gram(s) Oral once PRN Blood Glucose LESS THAN 70 milliGRAM(s)/deciliter      LABS:                        TELEMETRY: SB 49/50s     RADIOLOGY & ADDITIONAL TESTS: Reviewed.

## 2022-10-13 NOTE — DISCHARGE NOTE PROVIDER - DETAILS OF MALNUTRITION DIAGNOSIS/DIAGNOSES
This patient has been assessed with a concern for Malnutrition and was treated during this hospitalization for the following Nutrition diagnosis/diagnoses:     -  10/10/2022: Moderate protein-calorie malnutrition   -  10/10/2022: Underweight (BMI < 19)

## 2022-10-13 NOTE — PROGRESS NOTE ADULT - ASSESSMENT
82yo Cantonese/English speaking male, former smoker noncompliant with PMHx of HTN, HLD, DM2, COPD (uses son's O2), CKD stage 3 presented to [facility] ED after leg weakness and fall. Pt has had multiple falls this past month. Pt was admitted to [facility] as course was thought to be c/b volume overload, but echo did not yield any signs of CHF. Pt now pending SOHAIL acceptance

## 2022-10-13 NOTE — DISCHARGE NOTE PROVIDER - NSDCMRMEDTOKEN_GEN_ALL_CORE_FT
albuterol 0.63 mg/3 mL (0.021%) inhalation solution: 3 milliliter(s) inhaled 3 times a day, As Needed  amLODIPine 10 mg oral tablet: 1 tab(s) orally once a day  atenolol 25 mg oral tablet: 1 tab(s) orally once a day  hydroCHLOROthiazide 12.5 mg oral tablet: 1 tab(s) orally once a day  Imdur 30 mg oral tablet, extended release: 1 tab(s) orally once a day (in the morning)  nitroglycerin 0.4 mg sublingual tablet: 1 tab(s) sublingual every 5 minutes  pravastatin 40 mg oral tablet: 1 tab(s) orally once a day  ranolazine 500 mg oral tablet, extended release: 1 tab(s) orally 2 times a day  telmisartan 80 mg oral tablet: 1 tab(s) orally once a day   albuterol 0.63 mg/3 mL (0.021%) inhalation solution: 3 milliliter(s) inhaled 3 times a day, As Needed  amLODIPine 10 mg oral tablet: 1 tab(s) orally once a day  aspirin 81 mg oral delayed release tablet: 1 tab(s) orally once a day  atorvastatin 20 mg oral tablet: 1 tab(s) orally once a day (at bedtime)  Imdur 30 mg oral tablet, extended release: 1 tab(s) orally once a day (in the morning)  Multiple Vitamins oral tablet: 1 tab(s) orally once a day  ranolazine 500 mg oral tablet, extended release: 1 tab(s) orally 2 times a day

## 2022-10-13 NOTE — DISCHARGE NOTE PROVIDER - NSDCADMDATE_GEN_ALL_CORE_FT
Continue to use Tylenol or ibuprofen for pain control at home.   Follow-up with your primary care doctor within 2-3 days for reassessment. You have also been given a referral to neurology for your chronic headaches.  Return to the Emergency Department if you have worsening headache, worsening fever, confusion, weakness, neck stiffness, or for any other concerns.   09-Oct-2022 16:31

## 2022-10-13 NOTE — DISCHARGE NOTE PROVIDER - CARE PROVIDER_API CALL
Chris Mart  [employer], Cardiovascular  [address] [place] [address]  Phone: [phone]  Fax: [phone]  Follow Up Time:

## 2022-10-13 NOTE — PROGRESS NOTE ADULT - PROBLEM SELECTOR PLAN 3
h/o CAD and denies any CP or SOB   -NST 4/2022 (from outpatient PMD): moderate size high intensity reversible defect at the inferior wall. Myocardial ischemia at the inferior wall   --Per outpatient cardiologist, Dr. Chrsi Mart, pt is having stable CAD and no intervention at that time and will continue with medical management.   -Home meds confirmed with pt's pharmacy: Aspirin 81mg PO daily, Ranexa 500mg BID, Imdur 30mg daily  -CONT w/ all of the above

## 2022-10-13 NOTE — DISCHARGE NOTE PROVIDER - HOSPITAL COURSE
80yo noncompliant with medication, Cantonese/English speaking, former smoker male PMHx HTN, asthma (no hospitalizations/intubations) HLD, DM2 presented to [facility] ED after being found on the floor by his sons. Sons state he attempted to get up off of the couch where his legs became weak and he fell to the floor; no LOC, can remember the entire event, did not hit his head. Sons state this is the third time this has happened in the last three months (first hospitalization). Patient also complaining of b/l lower extremity (R>L) and right upper extremity edema and JULES x2-3 weeks. Patient currently denies chest pain, SOB, JULES, palpitations, dizziness, LOC, N/V/D, fever/chills/sick contact, diaphoresis, orthopnea/PND. In ER, VS 98F, HR 77, /74, RR 18, 98% on RA. Labs remarkable for negative UA, COVID negative, BNP elevated at 2307, trop negative x1, H/H 11.1/35.5, Calcium 11.9, albumin 2.6, BUN/Cr 27/2.09. Denies diagnosis of kidney disease; recently discharged from ER 9/27/22 for elevated creatinine at 2.09. CXR showing pleural effusion b/l. CT spine and head showing no fracture or hemorrhage/calvarial fracture. EKG sinus rhythm with PACs. Given Lasix 20mg IVP x1 in ED. Patient admitted to [facility] telemetry for workup of weakness and fall.       80yo Cantonese/English speaking male, former smoker noncompliant with PMHx of HTN, HLD, DM2, COPD (uses son's O2), CKD stage 3 presented to [facility] ED after leg weakness and fall. Pt has had multiple falls this past month. Pt was admitted to [facility] as course was thought to be c/b volume overload, but echo did not yield any signs of CHF. Pt now     For patients CHF - dCHF exacerbation, mild; s/p Lasix 20 IV x 1 in ER, has been euvolemic to dry since since; s/p TTE: Hyperdynamic LVEF 70-75%, Mild-Mod TR, PASP 41mmHg; c/w Amlodipine and Imdur for HTN, will hold Atenolol stopped given bradycardia  -MSK - Significant deconditioning, reason for admission - evaluated by PT - rec'd for SOHAIL, has approval, waiting for insurance auth; Daily OOB to chair

## 2022-10-13 NOTE — PROGRESS NOTE ADULT - PROBLEM SELECTOR PLAN 8
h/o COPD uses son's O2 at home  -Satting low 90s on RA  -no need for home O2 at this time  -encourage incentive spirometry    F: none  E: Replete if K+<4 and Mg<2  N: DASH Diet  DVT PPX: lovenox SQ  Dispo: pending SOHAIL placement
h/o COPD uses son's O2 at home  -Satting low 90s on RA  -no need for home O2 at this time    F: none  E: Replete if K+<4 and Mg<2  N: DASH Diet  DVT PPX: lovenox SQ  Dispo: pending SOHAIL assessment
h/o COPD uses son's O2 at home  -Satting low 90s on RA  -no need for home O2 at this time  -encourage incentive spirometry    F: none  E: Replete if K+<4 and Mg<2  N: DASH Diet  DVT PPX: lovenox SQ  Dispo: pending SOHAIL auth
h/o COPD uses son's O2 at home  -Satting 95% on 2L NC  -will need O2 on dc    F: none  E: Replete if K+<4 and Mg<2  N: DASH Diet  DVT PPX: lovenox SQ  Dispo: PT consult    Case discussed with Dr. Meza

## 2022-10-13 NOTE — PROGRESS NOTE ADULT - PROBLEM SELECTOR PLAN 7
-A1c 5.8  -Not on any home DM   -ISS ordered

## 2022-10-13 NOTE — DISCHARGE NOTE PROVIDER - NSDCCPCAREPLAN_GEN_ALL_CORE_FT
PRINCIPAL DISCHARGE DIAGNOSIS  Diagnosis: HF (heart failure)  Assessment and Plan of Treatment: You have a history of heart failure and had an ECHO showing EF 70-75%. Please continue taking imdur and limit your fluid intake to 1 Liter a day for heart failure. Continue taking your amlodpine 10mg, and imdur 30      SECONDARY DISCHARGE DIAGNOSES  Diagnosis: CAD (coronary artery disease)  Assessment and Plan of Treatment: Please follow up with Dr. Mart and continue taking your Aspirin 81mg, Ranexa 500mg twice a day, and your lipitor 20mg at night. and imdur 30 We stopped your Atenolol because your heart rate was low. Please follow up with your cardiologist.    Diagnosis: Fall at home  Assessment and Plan of Treatment: You underwent a CT scan at the hospital because of your fall. The scans of the Head and spine was negative for any acute findings of bleed or fracture. Please continue to follow up with your doctor and working with PT to help with balance

## 2022-10-13 NOTE — PROGRESS NOTE ADULT - PROBLEM SELECTOR PLAN 5
p/w 144/74   -Home meds confirmed with pt's pharmacy: Telmisartan 80mg daily, Amlodipine 10mda daily, Atenolol 25mg daily, imdur 30mg daily  -CONT Amlodipine 10mg, imdur 30mg daily  -discontinue telmisartan in setting of CKD and controlled BPs, pt noted to be SB in the high 40s,50s DC atenolol

## 2022-10-14 ENCOUNTER — TRANSCRIPTION ENCOUNTER (OUTPATIENT)
Age: 82
End: 2022-10-14

## 2022-10-14 VITALS — TEMPERATURE: 98 F

## 2022-10-14 LAB
GLUCOSE BLDC GLUCOMTR-MCNC: 108 MG/DL — HIGH (ref 70–99)
GLUCOSE BLDC GLUCOMTR-MCNC: 181 MG/DL — HIGH (ref 70–99)

## 2022-10-14 PROCEDURE — 99239 HOSP IP/OBS DSCHRG MGMT >30: CPT

## 2022-10-14 RX ORDER — ATENOLOL 25 MG/1
1 TABLET ORAL
Qty: 0 | Refills: 0 | DISCHARGE

## 2022-10-14 RX ORDER — NITROGLYCERIN 6.5 MG
1 CAPSULE, EXTENDED RELEASE ORAL
Qty: 0 | Refills: 0 | DISCHARGE

## 2022-10-14 RX ORDER — ATORVASTATIN CALCIUM 80 MG/1
1 TABLET, FILM COATED ORAL
Qty: 0 | Refills: 0 | DISCHARGE
Start: 2022-10-14

## 2022-10-14 RX ORDER — TELMISARTAN 20 MG/1
1 TABLET ORAL
Qty: 0 | Refills: 0 | DISCHARGE

## 2022-10-14 RX ORDER — ASPIRIN/CALCIUM CARB/MAGNESIUM 324 MG
1 TABLET ORAL
Qty: 0 | Refills: 0 | DISCHARGE
Start: 2022-10-14

## 2022-10-14 RX ADMIN — Medication 81 MILLIGRAM(S): at 12:44

## 2022-10-14 RX ADMIN — ISOSORBIDE MONONITRATE 30 MILLIGRAM(S): 60 TABLET, EXTENDED RELEASE ORAL at 12:44

## 2022-10-14 RX ADMIN — RANOLAZINE 500 MILLIGRAM(S): 500 TABLET, FILM COATED, EXTENDED RELEASE ORAL at 05:24

## 2022-10-14 RX ADMIN — AMLODIPINE BESYLATE 10 MILLIGRAM(S): 2.5 TABLET ORAL at 05:23

## 2022-10-14 RX ADMIN — Medication 1 TABLET(S): at 12:44

## 2022-10-14 NOTE — DISCHARGE NOTE NURSING/CASE MANAGEMENT/SOCIAL WORK - PATIENT PORTAL LINK FT
You can access the FollowMyHealth Patient Portal offered by [facility] by registering at the following website: [email or web address]. By joining [employer]’s FollowMyHealth portal, you will also be able to view your health information using other applications (apps) compatible with our system.

## 2022-10-14 NOTE — PROGRESS NOTE ADULT - PROVIDER SPECIALTY LIST ADULT
Intervent Cardiology

## 2022-10-14 NOTE — PROGRESS NOTE ADULT - SUBJECTIVE AND OBJECTIVE BOX
INCOMPLETE    OVERNIGHT EVENTS:  No acute events overnight.    SUBJECTIVE / INTERVAL HPI: Patient seen and examined at bedside.    Denies CP, SOB, dizziness/lightheadedness, palpitations    12 point ROS otherwise negative    VITAL SIGNS:  Vital Signs Last 24 Hrs  T(C): 36.3 (13 Oct 2022 22:01), Max: 36.4 (13 Oct 2022 10:26)  T(F): 97.4 (13 Oct 2022 22:01), Max: 97.6 (13 Oct 2022 10:26)  HR: 50 (14 Oct 2022 05:00) (45 - 55)  BP: 115/66 (14 Oct 2022 05:00) (89/45 - 133/59)  BP(mean): 70 (13 Oct 2022 17:31) (70 - 73)  RR: 18 (14 Oct 2022 05:00) (17 - 19)  SpO2: 95% (14 Oct 2022 05:00) (94% - 97%)    Parameters below as of 14 Oct 2022 05:00  Patient On (Oxygen Delivery Method): nasal cannula  O2 Flow (L/min): 2        I&O's Summary    13 Oct 2022 07:01  -  14 Oct 2022 07:00  --------------------------------------------------------  IN: 240 mL / OUT: 200 mL / NET: 40 mL          PHYSICAL EXAM:    General: sitting up in bed, NAD  HEENT: conjunctiva clear; MMM  Neck: supple, no JVD  Cardiovascular: RRR, no murmurs  Respiratory: CTA B/L  Gastrointestinal: soft, NT/ND, +BS  Extremities: WWP, no edema or cyanosis  Vascular: Peripheral pulses palpable 2+ bilaterally/ carotid 2+ b/l, no bruits; radial 2+ b/l; femoral 2+ b/l no bruits; DP/PT 2+ b/l  Neurological: AAOx3, no focal deficits    MEDICATIONS:  MEDICATIONS  (STANDING):  amLODIPine   Tablet 10 milliGRAM(s) Oral daily  aspirin enteric coated 81 milliGRAM(s) Oral daily  atorvastatin 20 milliGRAM(s) Oral at bedtime  dextrose 5%. 1000 milliLiter(s) (100 mL/Hr) IV Continuous <Continuous>  dextrose 5%. 1000 milliLiter(s) (50 mL/Hr) IV Continuous <Continuous>  dextrose 50% Injectable 25 Gram(s) IV Push once  dextrose 50% Injectable 12.5 Gram(s) IV Push once  dextrose 50% Injectable 25 Gram(s) IV Push once  enoxaparin Injectable 30 milliGRAM(s) SubCutaneous every 24 hours  glucagon  Injectable 1 milliGRAM(s) IntraMuscular once  influenza  Vaccine (HIGH DOSE) 0.7 milliLiter(s) IntraMuscular once  insulin lispro (ADMELOG) corrective regimen sliding scale   SubCutaneous Before meals and at bedtime  isosorbide   mononitrate ER Tablet (IMDUR) 30 milliGRAM(s) Oral daily  multivitamin 1 Tablet(s) Oral daily  ranolazine 500 milliGRAM(s) Oral two times a day    MEDICATIONS  (PRN):  dextrose Oral Gel 15 Gram(s) Oral once PRN Blood Glucose LESS THAN 70 milliGRAM(s)/deciliter      LABS:                        TELEMETRY:    RADIOLOGY & ADDITIONAL TESTS: Reviewed.

## 2022-10-14 NOTE — PROGRESS NOTE ADULT - REASON FOR ADMISSION
weakness causing fall

## 2022-10-14 NOTE — PROGRESS NOTE ADULT - NS ATTEND AMEND GEN_ALL_CORE FT
81M w/ pmh of HTN, HLD, DM, COPD p/w witnessed mechanical fall 2/2 gen weakness/deconditioning, also w/ mild lower extremity edema, amditted to cardiac tele    -CHF - dCHF exacerbation, mild; s/p Lasix 20 IV x 1 in ER, has been euvolemic to dry since since; s/p TTE: Hyperdynamic LVEF 70-75%, Mild-Mod TR, PASP 41mmHg; c/w Amlodipine and Imdur for HTN, will continue to hold Atenolol given bradycardia  -MSK - Significant deconditioning, reason for admission - evaluated by PT - rec'd for SOHAIL, has approval, waiting for insurance auth; Daily OOB to chair  -DASH diet  -DVT PPx  -Dispo: Cardiac Tele; Awaiting SOHAIL placement; likely here thru the weekend  -Full Code    José Miguel Meza MD  Cardiology Attending
81M w/ pmh of HTN, HLD, DM, COPD p/w witnessed mechanical fall 2/2 gen weakness/deconditioning, also w/ mild lower extremity edema, amditted to cardiac tele    -CHF - dCHF exacerbation, mild; s/p Lasix 20 IV x 1 in ER, has been euvolemic to dry since since; s/p TTE: Hyperdynamic LVEF 70-75%, Mild-Mod TR, PASP 41mmHg; c/w Amlodipine and Imdur for HTN, will hold Atenolol given bradycardia  -MSK - Significant deconditioning, reason for admission - evaluated by PT - rec'd for SOHAIL, has approval, waiting for insurance auth; Daily OOB to chair  -DASH diet  -DVT PPx  -Dispo: Cardiac Tele; Awaiting SOHAIL placement  -Full Code    José Miguel Meza MD  Cardiology Attending

## 2022-10-14 NOTE — DISCHARGE NOTE NURSING/CASE MANAGEMENT/SOCIAL WORK - NSDCPEFALRISK_GEN_ALL_CORE
For information on Fall & Injury Prevention, visit: [email or web address] OR  [email or web address] OR  https://www.cdc.gov/steadi/patient.html

## 2022-10-14 NOTE — PROGRESS NOTE ADULT - NUTRITIONAL ASSESSMENT
This patient has been assessed with a concern for Malnutrition and has been determined to have a diagnosis/diagnoses of Moderate protein-calorie malnutrition and Underweight (BMI < 19).    This patient is being managed with:   Diet DASH/TLC-  Sodium & Cholesterol Restricted  1200mL Fluid Restriction ([id number])  Supplement Feeding Modality:  Oral  Ensure Plus High Protein Cans or Servings Per Day:  1       Frequency:  Daily  Entered: Oct 10 2022 12:55PM    
This patient has been assessed with a concern for Malnutrition and has been determined to have a diagnosis/diagnoses of Moderate protein-calorie malnutrition and Underweight (BMI < 19).    This patient is being managed with:   Diet DASH/TLC-  Sodium & Cholesterol Restricted  1200mL Fluid Restriction ([id number])  Supplement Feeding Modality:  Oral  Ensure Plus High Protein Cans or Servings Per Day:  1       Frequency:  Daily  Entered: Oct 10 2022 12:55PM    
This patient has been assessed with a concern for Malnutrition and has been determined to have a diagnosis/diagnoses of Moderate protein-calorie malnutrition and Underweight (BMI < 19).    This patient is being managed with:   Diet DASH/TLC-  Sodium & Cholesterol Restricted  1200mL Fluid Restriction ([id number])  Supplement Feeding Modality:  Oral  Ensure Plus High Protein Cans or Servings Per Day:  1       Frequency:  Daily  Entered: Oct 10 2022 12:55PM    
This patient has been assessed with a concern for Malnutrition and has been determined to have a diagnosis/diagnoses of Moderate protein-calorie malnutrition and Underweight (BMI < 19).    This patient is being managed with:   Diet DASH/TLC-  Sodium & Cholesterol Restricted  1200mL Fluid Restriction ([id number])  Supplement Feeding Modality:  Oral  Ensure Plus High Protein Cans or Servings Per Day:  1       Frequency:  Daily  Entered: Oct 10 2022 12:55PM    
This patient has been assessed with a concern for Malnutrition and has been determined to have a diagnosis/diagnoses of Moderate protein-calorie malnutrition and Underweight (BMI < 19).    This patient is being managed with:   Diet DASH/TLC-  Sodium & Cholesterol Restricted  1200mL Fluid Restriction ([id number])  Supplement Feeding Modality:  Oral  Ensure Plus High Protein Cans or Servings Per Day:  1       Frequency:  Daily  Entered: Oct 10 2022 12:55PM

## 2022-10-25 DIAGNOSIS — I07.1 RHEUMATIC TRICUSPID INSUFFICIENCY: ICD-10-CM

## 2022-10-25 DIAGNOSIS — E44.0 MODERATE PROTEIN-CALORIE MALNUTRITION: ICD-10-CM

## 2022-10-25 DIAGNOSIS — R53.1 WEAKNESS: ICD-10-CM

## 2022-10-25 DIAGNOSIS — Z87.891 PERSONAL HISTORY OF NICOTINE DEPENDENCE: ICD-10-CM

## 2022-10-25 DIAGNOSIS — E78.5 HYPERLIPIDEMIA, UNSPECIFIED: ICD-10-CM

## 2022-10-25 DIAGNOSIS — E11.22 TYPE 2 DIABETES MELLITUS WITH DIABETIC CHRONIC KIDNEY DISEASE: ICD-10-CM

## 2022-10-25 DIAGNOSIS — N18.30 CHRONIC KIDNEY DISEASE, STAGE 3 UNSPECIFIED: ICD-10-CM

## 2022-10-25 DIAGNOSIS — I25.10 ATHEROSCLEROTIC HEART DISEASE OF NATIVE CORONARY ARTERY WITHOUT ANGINA PECTORIS: ICD-10-CM
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
## 2022-10-25 DIAGNOSIS — R00.1 BRADYCARDIA, UNSPECIFIED: ICD-10-CM

## 2022-10-25 DIAGNOSIS — M43.02 SPONDYLOLYSIS, CERVICAL REGION: ICD-10-CM

## 2022-10-25 DIAGNOSIS — I27.20 PULMONARY HYPERTENSION, UNSPECIFIED: ICD-10-CM

## 2022-10-25 DIAGNOSIS — J44.9 CHRONIC OBSTRUCTIVE PULMONARY DISEASE, UNSPECIFIED: ICD-10-CM

## 2022-10-25 DIAGNOSIS — I50.31 ACUTE DIASTOLIC (CONGESTIVE) HEART FAILURE: ICD-10-CM
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
## 2022-10-25 DIAGNOSIS — I13.0 HYPERTENSIVE HEART AND CHRONIC KIDNEY DISEASE WITH HEART FAILURE AND STAGE 1 THROUGH STAGE 4 CHRONIC KIDNEY DISEASE, OR UNSPECIFIED CHRONIC KIDNEY DISEASE: ICD-10-CM

## 2022-10-25 DIAGNOSIS — M19.011 PRIMARY OSTEOARTHRITIS, RIGHT SHOULDER: ICD-10-CM

## 2022-10-25 DIAGNOSIS — E11.9 TYPE 2 DIABETES MELLITUS WITHOUT COMPLICATIONS: ICD-10-CM

## 2022-10-30 ENCOUNTER — INPATIENT (INPATIENT)
Facility: HOSPITAL | Age: 82
LOS: 4 days | Discharge: HOSPICE | DRG: 180 | End: 2022-11-04
Attending: STUDENT IN AN ORGANIZED HEALTH CARE EDUCATION/TRAINING PROGRAM | Admitting: STUDENT IN AN ORGANIZED HEALTH CARE EDUCATION/TRAINING PROGRAM
Payer: MEDICARE

## 2022-10-30 VITALS
HEART RATE: 58 BPM | WEIGHT: 89.95 LBS | TEMPERATURE: 98 F | OXYGEN SATURATION: 97 % | SYSTOLIC BLOOD PRESSURE: 123 MMHG | DIASTOLIC BLOOD PRESSURE: 78 MMHG | HEIGHT: 61 IN | RESPIRATION RATE: 20 BRPM

## 2022-10-30 DIAGNOSIS — E78.5 HYPERLIPIDEMIA, UNSPECIFIED: ICD-10-CM

## 2022-10-30 DIAGNOSIS — J18.9 PNEUMONIA, UNSPECIFIED ORGANISM: ICD-10-CM

## 2022-10-30 DIAGNOSIS — N17.9 ACUTE KIDNEY FAILURE, UNSPECIFIED: ICD-10-CM

## 2022-10-30 DIAGNOSIS — I10 ESSENTIAL (PRIMARY) HYPERTENSION: ICD-10-CM

## 2022-10-30 DIAGNOSIS — I82.409 ACUTE EMBOLISM AND THROMBOSIS OF UNSPECIFIED DEEP VEINS OF UNSPECIFIED LOWER EXTREMITY: ICD-10-CM

## 2022-10-30 DIAGNOSIS — Z29.9 ENCOUNTER FOR PROPHYLACTIC MEASURES, UNSPECIFIED: ICD-10-CM

## 2022-10-30 DIAGNOSIS — J44.9 CHRONIC OBSTRUCTIVE PULMONARY DISEASE, UNSPECIFIED: ICD-10-CM

## 2022-10-30 DIAGNOSIS — E11.9 TYPE 2 DIABETES MELLITUS WITHOUT COMPLICATIONS: ICD-10-CM

## 2022-10-30 DIAGNOSIS — E83.52 HYPERCALCEMIA: ICD-10-CM

## 2022-10-30 LAB
ALBUMIN SERPL ELPH-MCNC: 1.2 G/DL — LOW (ref 3.3–5)
ALBUMIN SERPL ELPH-MCNC: 2.5 G/DL — LOW (ref 3.3–5)
ALP SERPL-CCNC: 40 U/L — SIGNIFICANT CHANGE UP (ref 40–120)
ALT FLD-CCNC: <5 U/L — LOW (ref 10–45)
ANION GAP SERPL CALC-SCNC: 8 MMOL/L — SIGNIFICANT CHANGE UP (ref 5–17)
ANION GAP SERPL CALC-SCNC: 8 MMOL/L — SIGNIFICANT CHANGE UP (ref 5–17)
APPEARANCE UR: CLEAR — SIGNIFICANT CHANGE UP
AST SERPL-CCNC: 5 U/L — LOW (ref 10–40)
BACTERIA # UR AUTO: ABNORMAL /HPF
BASE EXCESS BLDV CALC-SCNC: 13.8 MMOL/L — HIGH (ref -2–3)
BASOPHILS # BLD AUTO: 0.02 K/UL — SIGNIFICANT CHANGE UP (ref 0–0.2)
BASOPHILS NFR BLD AUTO: 0.3 % — SIGNIFICANT CHANGE UP (ref 0–2)
BILIRUB DIRECT SERPL-MCNC: 0.2 MG/DL — SIGNIFICANT CHANGE UP (ref 0–0.3)
BILIRUB INDIRECT FLD-MCNC: 0 MG/DL — LOW (ref 0.2–1)
BILIRUB SERPL-MCNC: 0.2 MG/DL — SIGNIFICANT CHANGE UP (ref 0.2–1.2)
BILIRUB UR-MCNC: NEGATIVE — SIGNIFICANT CHANGE UP
BUN SERPL-MCNC: 63 MG/DL — HIGH (ref 7–23)
BUN SERPL-MCNC: 66 MG/DL — HIGH (ref 7–23)
CALCIUM SERPL-MCNC: 13.6 MG/DL — CRITICAL HIGH (ref 8.4–10.5)
CALCIUM SERPL-MCNC: 13.9 MG/DL — CRITICAL HIGH (ref 8.4–10.5)
CHLORIDE SERPL-SCNC: 100 MMOL/L — SIGNIFICANT CHANGE UP (ref 96–108)
CHLORIDE SERPL-SCNC: 100 MMOL/L — SIGNIFICANT CHANGE UP (ref 96–108)
CK MB CFR SERPL CALC: <1 NG/ML — SIGNIFICANT CHANGE UP (ref 0–6.7)
CK SERPL-CCNC: 12 U/L — LOW (ref 30–200)
CO2 BLDV-SCNC: 42.6 MMOL/L — HIGH (ref 22–26)
CO2 SERPL-SCNC: 35 MMOL/L — HIGH (ref 22–31)
CO2 SERPL-SCNC: 36 MMOL/L — HIGH (ref 22–31)
COD CRY URNS QL: ABNORMAL /HPF
COLOR SPEC: YELLOW — SIGNIFICANT CHANGE UP
CREAT ?TM UR-MCNC: 62 MG/DL — SIGNIFICANT CHANGE UP
CREAT SERPL-MCNC: 3.89 MG/DL — HIGH (ref 0.5–1.3)
CREAT SERPL-MCNC: 3.96 MG/DL — HIGH (ref 0.5–1.3)
D DIMER BLD IA.RAPID-MCNC: 511 NG/ML DDU — HIGH
DIFF PNL FLD: NEGATIVE — SIGNIFICANT CHANGE UP
EGFR: 14 ML/MIN/1.73M2 — LOW
EGFR: 15 ML/MIN/1.73M2 — LOW
EOSINOPHIL # BLD AUTO: 0.08 K/UL — SIGNIFICANT CHANGE UP (ref 0–0.5)
EOSINOPHIL NFR BLD AUTO: 1 % — SIGNIFICANT CHANGE UP (ref 0–6)
EPI CELLS # UR: SIGNIFICANT CHANGE UP /HPF (ref 0–5)
GLUCOSE BLDC GLUCOMTR-MCNC: 152 MG/DL — HIGH (ref 70–99)
GLUCOSE SERPL-MCNC: 133 MG/DL — HIGH (ref 70–99)
GLUCOSE SERPL-MCNC: 160 MG/DL — HIGH (ref 70–99)
GLUCOSE UR QL: NEGATIVE — SIGNIFICANT CHANGE UP
HCO3 BLDV-SCNC: 41 MMOL/L — HIGH (ref 22–29)
HCT VFR BLD CALC: 30.9 % — LOW (ref 39–50)
HGB BLD-MCNC: 9.6 G/DL — LOW (ref 13–17)
IMM GRANULOCYTES NFR BLD AUTO: 1.6 % — HIGH (ref 0–0.9)
KETONES UR-MCNC: NEGATIVE — SIGNIFICANT CHANGE UP
LACTATE SERPL-SCNC: 1.5 MMOL/L — SIGNIFICANT CHANGE UP (ref 0.5–2)
LEUKOCYTE ESTERASE UR-ACNC: ABNORMAL
LYMPHOCYTES # BLD AUTO: 0.69 K/UL — LOW (ref 1–3.3)
LYMPHOCYTES # BLD AUTO: 8.8 % — LOW (ref 13–44)
MAGNESIUM SERPL-MCNC: 2 MG/DL — SIGNIFICANT CHANGE UP (ref 1.6–2.6)
MCHC RBC-ENTMCNC: 27 PG — SIGNIFICANT CHANGE UP (ref 27–34)
MCHC RBC-ENTMCNC: 31.1 GM/DL — LOW (ref 32–36)
MCV RBC AUTO: 87 FL — SIGNIFICANT CHANGE UP (ref 80–100)
MONOCYTES # BLD AUTO: 1.25 K/UL — HIGH (ref 0–0.9)
MONOCYTES NFR BLD AUTO: 15.9 % — HIGH (ref 2–14)
NEUTROPHILS # BLD AUTO: 5.71 K/UL — SIGNIFICANT CHANGE UP (ref 1.8–7.4)
NEUTROPHILS NFR BLD AUTO: 72.4 % — SIGNIFICANT CHANGE UP (ref 43–77)
NITRITE UR-MCNC: POSITIVE
NRBC # BLD: 0 /100 WBCS — SIGNIFICANT CHANGE UP (ref 0–0)
NT-PROBNP SERPL-SCNC: 1998 PG/ML — HIGH (ref 0–300)
OSMOLALITY UR: 346 MOSM/KG — SIGNIFICANT CHANGE UP (ref 300–900)
PCO2 BLDV: 60 MMHG — HIGH (ref 42–55)
PH BLDV: 7.44 — HIGH (ref 7.32–7.43)
PH UR: 6 — SIGNIFICANT CHANGE UP (ref 5–8)
PHOSPHATE SERPL-MCNC: 2.5 MG/DL — SIGNIFICANT CHANGE UP (ref 2.5–4.5)
PLATELET # BLD AUTO: 262 K/UL — SIGNIFICANT CHANGE UP (ref 150–400)
PO2 BLDV: 53 MMHG — HIGH (ref 25–45)
POTASSIUM SERPL-MCNC: 4.4 MMOL/L — SIGNIFICANT CHANGE UP (ref 3.5–5.3)
POTASSIUM SERPL-MCNC: 4.5 MMOL/L — SIGNIFICANT CHANGE UP (ref 3.5–5.3)
POTASSIUM SERPL-SCNC: 4.4 MMOL/L — SIGNIFICANT CHANGE UP (ref 3.5–5.3)
POTASSIUM SERPL-SCNC: 4.5 MMOL/L — SIGNIFICANT CHANGE UP (ref 3.5–5.3)
POTASSIUM UR-SCNC: 31 MMOL/L — SIGNIFICANT CHANGE UP
PROCALCITONIN SERPL-MCNC: 0.12 NG/ML — HIGH (ref 0.02–0.1)
PROT ?TM UR-MCNC: 25 MG/DL — HIGH (ref 0–12)
PROT SERPL-MCNC: 3.3 G/DL — LOW (ref 6–8.3)
PROT UR-MCNC: NEGATIVE MG/DL — SIGNIFICANT CHANGE UP
PROT/CREAT UR-RTO: 0.4 RATIO — SIGNIFICANT CHANGE UP (ref 0–0.2)
RBC # BLD: 3.55 M/UL — LOW (ref 4.2–5.8)
RBC # FLD: 14.9 % — HIGH (ref 10.3–14.5)
RBC CASTS # UR COMP ASSIST: < 5 /HPF — SIGNIFICANT CHANGE UP
SAO2 % BLDV: 87.9 % — SIGNIFICANT CHANGE UP (ref 67–88)
SARS-COV-2 RNA SPEC QL NAA+PROBE: NEGATIVE — SIGNIFICANT CHANGE UP
SODIUM SERPL-SCNC: 143 MMOL/L — SIGNIFICANT CHANGE UP (ref 135–145)
SODIUM SERPL-SCNC: 144 MMOL/L — SIGNIFICANT CHANGE UP (ref 135–145)
SODIUM UR-SCNC: <20 MMOL/L — SIGNIFICANT CHANGE UP
SP GR SPEC: 1.02 — SIGNIFICANT CHANGE UP (ref 1–1.03)
T4 AB SER-ACNC: 3.69 UG/DL — LOW (ref 4.5–11.7)
TROPONIN T SERPL-MCNC: 0.04 NG/ML — CRITICAL HIGH (ref 0–0.01)
TROPONIN T SERPL-MCNC: 0.08 NG/ML — CRITICAL HIGH (ref 0–0.01)
TROPONIN T SERPL-MCNC: 0.09 NG/ML — CRITICAL HIGH (ref 0–0.01)
TSH SERPL-MCNC: 2.36 UIU/ML — SIGNIFICANT CHANGE UP (ref 0.27–4.2)
UROBILINOGEN FLD QL: 0.2 E.U./DL — SIGNIFICANT CHANGE UP
UUN UR-MCNC: 523 MG/DL — SIGNIFICANT CHANGE UP
WBC # BLD: 7.88 K/UL — SIGNIFICANT CHANGE UP (ref 3.8–10.5)
WBC # FLD AUTO: 7.88 K/UL — SIGNIFICANT CHANGE UP (ref 3.8–10.5)
WBC UR QL: > 10 /HPF

## 2022-10-30 PROCEDURE — 93010 ELECTROCARDIOGRAM REPORT: CPT

## 2022-10-30 PROCEDURE — 99291 CRITICAL CARE FIRST HOUR: CPT

## 2022-10-30 PROCEDURE — 93971 EXTREMITY STUDY: CPT | Mod: 26,RT

## 2022-10-30 PROCEDURE — 71045 X-RAY EXAM CHEST 1 VIEW: CPT | Mod: 26,76

## 2022-10-30 PROCEDURE — 99233 SBSQ HOSP IP/OBS HIGH 50: CPT | Mod: GC

## 2022-10-30 RX ORDER — SODIUM CHLORIDE 9 MG/ML
1000 INJECTION, SOLUTION INTRAVENOUS ONCE
Refills: 0 | Status: DISCONTINUED | OUTPATIENT
Start: 2022-10-30 | End: 2022-10-30

## 2022-10-30 RX ORDER — SODIUM CHLORIDE 9 MG/ML
1000 INJECTION, SOLUTION INTRAVENOUS
Refills: 0 | Status: DISCONTINUED | OUTPATIENT
Start: 2022-10-30 | End: 2022-10-30

## 2022-10-30 RX ORDER — DEXTROSE 50 % IN WATER 50 %
15 SYRINGE (ML) INTRAVENOUS ONCE
Refills: 0 | Status: DISCONTINUED | OUTPATIENT
Start: 2022-10-30 | End: 2022-11-02

## 2022-10-30 RX ORDER — IPRATROPIUM/ALBUTEROL SULFATE 18-103MCG
3 AEROSOL WITH ADAPTER (GRAM) INHALATION EVERY 6 HOURS
Refills: 0 | Status: DISCONTINUED | OUTPATIENT
Start: 2022-10-30 | End: 2022-11-04

## 2022-10-30 RX ORDER — SODIUM CHLORIDE 9 MG/ML
1000 INJECTION, SOLUTION INTRAVENOUS
Refills: 0 | Status: DISCONTINUED | OUTPATIENT
Start: 2022-10-30 | End: 2022-11-02

## 2022-10-30 RX ORDER — INSULIN LISPRO 100/ML
VIAL (ML) SUBCUTANEOUS EVERY 6 HOURS
Refills: 0 | Status: DISCONTINUED | OUTPATIENT
Start: 2022-10-30 | End: 2022-11-02

## 2022-10-30 RX ORDER — LANOLIN ALCOHOL/MO/W.PET/CERES
3 CREAM (GRAM) TOPICAL AT BEDTIME
Refills: 0 | Status: DISCONTINUED | OUTPATIENT
Start: 2022-10-30 | End: 2022-10-30

## 2022-10-30 RX ORDER — DEXTROSE 50 % IN WATER 50 %
25 SYRINGE (ML) INTRAVENOUS ONCE
Refills: 0 | Status: DISCONTINUED | OUTPATIENT
Start: 2022-10-30 | End: 2022-11-02

## 2022-10-30 RX ORDER — CALCITONIN SALMON 200 [IU]/ML
160 INJECTION, SOLUTION INTRAMUSCULAR EVERY 12 HOURS
Refills: 0 | Status: COMPLETED | OUTPATIENT
Start: 2022-10-30 | End: 2022-10-31

## 2022-10-30 RX ORDER — CEFTRIAXONE 500 MG/1
1000 INJECTION, POWDER, FOR SOLUTION INTRAMUSCULAR; INTRAVENOUS EVERY 24 HOURS
Refills: 0 | Status: DISCONTINUED | OUTPATIENT
Start: 2022-10-30 | End: 2022-11-01

## 2022-10-30 RX ORDER — SODIUM CHLORIDE 9 MG/ML
1000 INJECTION, SOLUTION INTRAVENOUS
Refills: 0 | Status: DISCONTINUED | OUTPATIENT
Start: 2022-10-30 | End: 2022-10-31

## 2022-10-30 RX ORDER — AZITHROMYCIN 500 MG/1
500 TABLET, FILM COATED ORAL EVERY 24 HOURS
Refills: 0 | Status: DISCONTINUED | OUTPATIENT
Start: 2022-10-30 | End: 2022-11-01

## 2022-10-30 RX ORDER — SODIUM CHLORIDE 9 MG/ML
500 INJECTION, SOLUTION INTRAVENOUS ONCE
Refills: 0 | Status: COMPLETED | OUTPATIENT
Start: 2022-10-30 | End: 2022-10-30

## 2022-10-30 RX ORDER — ONDANSETRON 8 MG/1
4 TABLET, FILM COATED ORAL EVERY 8 HOURS
Refills: 0 | Status: DISCONTINUED | OUTPATIENT
Start: 2022-10-30 | End: 2022-10-30

## 2022-10-30 RX ORDER — DEXTROSE 50 % IN WATER 50 %
12.5 SYRINGE (ML) INTRAVENOUS ONCE
Refills: 0 | Status: DISCONTINUED | OUTPATIENT
Start: 2022-10-30 | End: 2022-11-02

## 2022-10-30 RX ORDER — GLUCAGON INJECTION, SOLUTION 0.5 MG/.1ML
1 INJECTION, SOLUTION SUBCUTANEOUS ONCE
Refills: 0 | Status: DISCONTINUED | OUTPATIENT
Start: 2022-10-30 | End: 2022-11-02

## 2022-10-30 RX ORDER — ACETAMINOPHEN 500 MG
650 TABLET ORAL EVERY 6 HOURS
Refills: 0 | Status: DISCONTINUED | OUTPATIENT
Start: 2022-10-30 | End: 2022-11-04

## 2022-10-30 RX ADMIN — CALCITONIN SALMON 160 INTERNATIONAL UNIT(S): 200 INJECTION, SOLUTION INTRAMUSCULAR at 19:53

## 2022-10-30 RX ADMIN — SODIUM CHLORIDE 500 MILLILITER(S): 9 INJECTION, SOLUTION INTRAVENOUS at 14:37

## 2022-10-30 RX ADMIN — Medication 3 MILLILITER(S): at 22:15

## 2022-10-30 RX ADMIN — SODIUM CHLORIDE 80 MILLILITER(S): 9 INJECTION, SOLUTION INTRAVENOUS at 22:30

## 2022-10-30 RX ADMIN — Medication 2: at 22:42

## 2022-10-30 RX ADMIN — AZITHROMYCIN 255 MILLIGRAM(S): 500 TABLET, FILM COATED ORAL at 19:37

## 2022-10-30 RX ADMIN — SODIUM CHLORIDE 500 MILLILITER(S): 9 INJECTION, SOLUTION INTRAVENOUS at 16:30

## 2022-10-30 RX ADMIN — CEFTRIAXONE 100 MILLIGRAM(S): 500 INJECTION, POWDER, FOR SOLUTION INTRAMUSCULAR; INTRAVENOUS at 19:00

## 2022-10-30 NOTE — H&P ADULT - HISTORY OF PRESENT ILLNESS
80yo, Cantonese/English speaking, former smoker male PMHx HTN, asthma vs COPD (?) (no hospitalizations/intubations) HLD, DM2, HFpEF, presents with fatigue and weakness for two weeks. Per son at bedside, who is historian, patient has been feeling out of it, is less conversational and has reported some SOB. Patient was recently admitted a couple weeks ago to [facility] fo mechanical fall, and was admitted to cardiac telemetry for diastolic heart failure and IV lasix was given, TTE revealed normal EF. Patient was discharged to [facility] and has been at rehab for two weeks. At rehab, son reports patient with poor appetite / PO intake for two weeks. He has progressively gotten weaker. Patient's son reports prior to rehab, father lived at home by himself, independent in most ADLs. His other son went over 2 days per week. He reports chronic smoking and believes patient was diagnosed with COPD but is not sure, he uses pro-air occasionally.     ICU consult called for hypercalcemia, ~13.9. Patient has prior hypercalcemia, mild to 11. Not worked up. Son states patient was on hctz, but not on rehab med list. At rehab unclear if he was given diuresis but were concerned about hypocalcemia. Patient is currently AAOX2 (name and place), denies focal symptoms. Drowsy appearing. Denies chest pain, palpitations, dizziness, vomiting, abd pain, N/D, dysuria, HA.     ED vitals: T afebrile  HR 58  RR  /78 SpO2 97 on 2L   Labs significant ~Ca 13, BUN 66, Cr 3.96 (baseline ~2)   Imaging/EKG: EKG: sinus bradycardia, CXR: RLL opacity   Interventions: LR 500cc bolus   Consults: ICU

## 2022-10-30 NOTE — ED PROVIDER NOTE - CARE PLAN
Principal Discharge DX:	Hypercalcemia  Secondary Diagnosis:	Acute renal failure (ARF)  Secondary Diagnosis:	Elevated troponin  Secondary Diagnosis:	Dyspnea   1 Principal Discharge DX:	Hypercalcemia  Secondary Diagnosis:	Acute renal failure (ARF)  Secondary Diagnosis:	Elevated troponin  Secondary Diagnosis:	Dyspnea  Secondary Diagnosis:	Acute respiratory failure with hypoxia

## 2022-10-30 NOTE — H&P ADULT - PROBLEM SELECTOR PLAN 6
- on amlodipine 10mg at home, continue as BP tolerates  - on imdur 30mg extended release, continue as BP tolerates #Asthma/ ? COPD  - on 2L O2, saturating 97%   - patient's son reports possible copd diagnosis, former smoker   - documentation reports asthma hx  - no wheezing on exam   - continue with duoneb prn, on proair prn at home   - wean off O2

## 2022-10-30 NOTE — H&P ADULT - PROBLEM SELECTOR PLAN 2
Small R-sided effusion seen on POCUS. Read pending on CXR. Procal 0.12 confounded by ?CKD  - started on azithromycin 500mg q24h & CTX 1g q24h for empiric CAP coverage  - monitor resp status

## 2022-10-30 NOTE — PATIENT PROFILE ADULT - FALL HARM RISK - HARM RISK INTERVENTIONS

## 2022-10-30 NOTE — H&P ADULT - NSHPPHYSICALEXAM_GEN_ALL_CORE
PHYSICAL EXAM:    Vital Signs Last 24 Hrs  T(C): 36.3 (30 Oct 2022 18:54), Max: 36.9 (30 Oct 2022 13:15)  T(F): 97.3 (30 Oct 2022 18:54), Max: 98.4 (30 Oct 2022 13:15)  HR: 61 (30 Oct 2022 18:50) (58 - 110)  BP: 135/62 (30 Oct 2022 18:50) (123/78 - 153/65)  BP(mean): --  RR: 20 (30 Oct 2022 18:50) (20 - 20)  SpO2: 99% (30 Oct 2022 18:50) (86% - 100%)    Parameters below as of 30 Oct 2022 18:50  Patient On (Oxygen Delivery Method): mask, nonrebreather  O2 Flow (L/min): 15    I&O's Summary    30 Oct 2022 07:01  -  30 Oct 2022 19:29  --------------------------------------------------------  IN: 900 mL / OUT: 100 mL / NET: 800 mL        General: Disoriented   HEENT: NC/AT; PERRLA, mucus membranes dry  Neck: supple, trachea midline  Cardiovascular: Distant heart sounds, +S1/S2; NO M/R/G  Respiratory: Diminished breath sounds bilaterally. L more diminished than R. Occasional wheeze. No accessory muscle use  Gastrointestinal: soft, NT/ND; +BSx4  Extremities: RUE with pitting edema to shoulder. No palpable cord. No asymmetric erythema/warmth. No LUE swelling/erythema/warmth. B/L LE w/o edema or tenderness to palpation   Vascular: 2+ radial, DP/PT pulses B/L  Neurological: AAOx3; no focal deficits

## 2022-10-30 NOTE — ED PROVIDER NOTE - OBJECTIVE STATEMENT
81yM w/ HTN DM HLD CKD former smoker, COPD intermittently uses his son's home O2  brought in by son for lethargy, SOB and poor PO intake  per chart review, pt was living alone until ~2 weeks ago when he was brought to ED for falls, JULES, swelling to RUE and b/l LE. Was admitted to 5 uris and had cardiac workup. Echo showed EF 70-75% no systolic function. he was then seen by PT and rec for [facility]  Since then he has been at [facility], not very mobile. Son says he also hasn't been eating much except an ensure or two. Was complaining of shortness of breath and was noted to be more lethargic which prompted this visit  son also notes persistently swollen RUE. Swelling to b/l LE resolving

## 2022-10-30 NOTE — ED ADULT NURSE NOTE - ED STAT RN HANDOFF DETAILS
as per 7 [facility] DOMI Proctor, no bed in room, unable to transferred pt to admitting unit. report given to Holding DOMI Henson.

## 2022-10-30 NOTE — ED PROVIDER NOTE - PROGRESS NOTE DETAILS
results noted ARF with Cr 3.96 today, compared to 2.0 earlier this month. Also noted to be hypercalcemic to 13.9. Consistent with pre-renal injury 2/2 dehydration also appears dry on exam. however cxr with some pulmonary congestion as well. Will need delicate balance of fluid resuscitation and correction of electrolyte and kidney imbalances. ICU consulted and will see pt. pt got total 900cc fluid and noted to complain of worsened SOB. Hypoxic to 84%, switched to NRB, paged respiratory to place pt on BIPAP. Inpatient team updated as well and will come down

## 2022-10-30 NOTE — CONSULT NOTE ADULT - ATTENDING COMMENTS
Symptomatic hypercalcemia and ENZO on CKD, poor PO intake w/ weight loss, immobility. On exam he is afebrile, saturating 99% on 2L NC, in no respiratory distress; mucus membranes dry, appears lethargic but easily arousable. Cachectic, HR bradycardic no M/R/G, abdomen soft and nontender. No LLE. Labs significant for hypercalcemia (alb and protein pending), anemia (stable), ENZO on CKD, elevated BNP but decreased from last admission. A line pattern on POCUS with small R sided effusion, cardiac function normal. No hydronephrosis, bladder distended    DDx for hypercalcemia includes severe dehydration, immobility, multiple myeloma, hypercalcemia 2/2 undiagnosed malignancy, hypothyroidism. S/P 1L IVF, will continue with 150cc/hr with frequent lung checks - no HFpEF or HFrEF on prior TTE but given age and comorbidities may not be able to tolerate aggressive fluid resuscitation. Check PTH, Vit D (25 and 1,25), iCa, Alb, protein, TSH, UA/Ulytes, SPEP/UPEP. Place lakhani for strict I/O's. If no improvement in BUN/Cr and/or oliguria develops will consult renal. Discussed overal C w/ family - DNR/DNI. NPO for now given lethargy. Symptomatic hypercalcemia and ENZO on CKD, poor PO intake w/ weight loss, immobility. On exam he is afebrile, saturating 99% on 2L NC, in no respiratory distress; mucus membranes dry, appears lethargic but easily arousable. Cachectic, HR bradycardic no M/R/G, abdomen soft and nontender. No LLE. Labs significant for hypercalcemia (alb and protein pending), anemia (stable), ENZO on CKD, elevated BNP but decreased from last admission. A line pattern on POCUS with small R sided effusion, cardiac function normal. No hydronephrosis, bladder distended    DDx for hypercalcemia includes severe dehydration, immobility, multiple myeloma, hypercalcemia 2/2 undiagnosed malignancy, hypothyroidism. S/P 1L IVF, will continue with 150cc/hr with frequent lung checks - no HFpEF or HFrEF on prior TTE but given age and comorbidities may not be able to tolerate aggressive fluid resuscitation. Check PTH, Vit D (25 and 1,25), iCa, Alb, protein, TSH, UA/Ulytes, SPEP/UPEP. Place lakhani for strict I/O's. If no improvement in BUN/Cr and/or oliguria develops will consult renal. Calcitonin, but hold bisphosphonate. Discussed overal [facility] w/ family - DNR/DNI. NPO for now given lethargy.

## 2022-10-30 NOTE — H&P ADULT - ASSESSMENT
80yo, Cantonese/English speaking, former smoker male PMHx HTN, asthma vs COPD (?) (no hospitalizations/intubations) HLD, DM2, HFpEF, presents with fatigue and weakness for two weeks. Found to have symptomatic hypercalcemia.  80yo, Cantonese/English speaking, former smoker male PMHx HTN, asthma vs COPD (?) (no hospitalizations/intubations) HLD, DM2, HFpEF, presents with fatigue and weakness for two weeks. Found to have symptomatic hypercalcemia, UTI, and ?CAP, admitted for further management.

## 2022-10-30 NOTE — H&P ADULT - PROBLEM SELECTOR PLAN 3
RUE with pitting edema to shoulder, possible DVT. Wells 4.5 (+3 for likely DVT RUE, +1.5 for HR>100)  - f/u RUE Doppler  - f/u D-dimer UA: +nitrites, small LE, >10WBC, many bacteria  - f/u urine cx  - c/w CTX 1g q24h

## 2022-10-30 NOTE — CONSULT NOTE ADULT - ASSESSMENT
80yo, Cantonese/English speaking, former smoker male PMHx HTN, asthma vs COPD (?) (no hospitalizations/intubations) HLD, DM2, HFpEF, presents with fatigue and weakness for two weeks. Found to have symptomatic hypercalcemia.       #Symptomatic hypercalcemia   - s/p 500cc bolus of LR   - patient with significant hypovolemia on exam  - give additional 500cc bolus of LR, start on LR @150cc/hr   - start bisphosphonate and calcitonin in setting of symptomatic hypercalcemia   - monitor urine output closely, strict I/Os   - send hypercalcemia work up: PTH, PTHrP, ionized calcium, albumin, total protein (calculate protein gap), TSH, SPEP, UPEP, Phos, Vit D     #ENZO   - likely pre-renal 2/2 dehydration/ poor PO intake   - IVF as above  - monitor strict I/Os  - urine Creatinine and urine Na pending    #Asthma/ ? COPD  - on 2L O2, saturating 97%   - patient's son reports possible copd diagnosis, former smoker   - documentation reports asthma hx  - no wheezing on exam   - continue with duoneb prn, on proair prn at home   - wean off O2     #HTN   - on amlodipine 10mg at home, continue as BP tolerates  - on imdur 30mg extended release, continue as BP tolerates     #HLD  - continue lipitor 20mg qhs    #DM2  - continue with sliding scale, monitor FSG    Patient to be admitted to telemetry for further management.  80yo, Cantonese/English speaking, former smoker male PMHx HTN, asthma vs COPD (?) (no hospitalizations/intubations) HLD, DM2, HFpEF, presents with fatigue and weakness for two weeks. Found to have symptomatic hypercalcemia.       #Symptomatic hypercalcemia   - s/p 500cc bolus of LR   - patient with significant hypovolemia on exam  - give additional 500cc bolus of LR, start on LR @150cc/hr   - start calcitonin in setting of symptomatic hypercalcemia hold bisphosphonate i/s/o ENZO  - monitor urine output closely, strict I/Os   - send hypercalcemia work up: PTH, PTHrP, ionized calcium, albumin, total protein (calculate protein gap), TSH, SPEP, UPEP, Phos, Vit D     #ENZO   - likely pre-renal 2/2 dehydration/ poor PO intake   - IVF as above  - monitor strict I/Os  - urine Creatinine and urine Na pending    #Asthma/ ? COPD  - on 2L O2, saturating 97%   - patient's son reports possible copd diagnosis, former smoker   - documentation reports asthma hx  - no wheezing on exam   - continue with duoneb prn, on proair prn at home   - wean off O2     #HTN   - on amlodipine 10mg at home, continue as BP tolerates  - on imdur 30mg extended release, continue as BP tolerates     #HLD  - continue lipitor 20mg qhs    #DM2  - continue with sliding scale, monitor FSG    Patient to be admitted to telemetry for further management.

## 2022-10-30 NOTE — PATIENT PROFILE ADULT - FALL HARM RISK - FACTORS
Patient seen and evaluated @850  Chief Complaint: Patient is a 89y old  Male who presents with a chief complaint of syncope (06 Jun 2021 09:36)      HPI:  89 M CAD s/p stents, low-flow low gradient AS, HFrEF 20% s/p MDT Bi-V ICD, COPD who presents with syncope x 2.    Pt reports usual state of health until yesterday night when he experienced an episode of syncope. His wife saw him in bed but he started to slide off the bed onto the floor. He denies any prodrome or feeling any chest pain, sob, palpitations, or shock from his ICD. This AM, the same scenario happened again which prompted the wife to call EMS.     On interrogation, it showed multiple episodes (24) of VT since 5/17 leading up to today that required ATP and shocks. The last one was 6/6/21 at 617AM which did not break with 2 ATPs and ultimately led to shock. (06 Jun 2021 11:40)    CARDIAC HISTORY: I have known the patient since 2006.  He had an MI and angioplasty in 1994.  Had a cath in 2011 with a 40% aneurysmal left main normal LAD and circumflex with a 95% right lesion and an akinetic inferior wall.  Treated medically and did well other than an admission for cellulitis in 2013 with positive blood cultures.  No endocarditis.  Issues with shortness of breath both from COPD and CHF.  Able to undergo knee replacement in 2014 and then later endovascular repair of an abdominal aortic aneurysm in October 2014.  Stress echo in 2015 showed an ejection fraction of 27%.  Cath revealed unchanged coronaries but LVEF 35%.  The right coronary was stented but did not change his LVEF so we had a defibrillator and biventricular pacemaker placed by Dr. Russell in August 2015.  I changed his Avapro to Entresto and the patient's CHF seem to improve.  August 25 of 2017 had a syncopal episode in the bathroom.  He had no idea what happened but interrogation showed V. tach followed by V. fib which led to a defibrillator shock.  No recurrence and no AICD shocks since then until this admission.  2019 had a couple of runs of ventricular tachycardia that were paced terminated.  98% of the time biventricular pacing.  2021 episode of atrial fibrillation on interrogation that lasted an hour and 48 minutes.  Progressive heart failure and he was worked up for possible TAVR with low gradient aortic stenosis including a dobutamine echo and was found not to be a candidate.  Has had progressive shortness of breath and LV dysfunction with adjusting of his medications and recently adding Jardiance and then increasing it from 10-25.  Issues with creatinine running between 2 and 2.5.  BNP as high as 18,000.  Most recent echo was March 23 of this year with an aortic gradient of 31 peak and 19 mean, dimensionless index 0.20 and no AI.  Severe segmental LV systolic dysfunction with LVEF 23 to 25%.  Mild LVH.  Only mild MR and mild to moderate TR with RVSP 54.    PMH:   HTN (hypertension)    HLD (hyperlipidemia)    CAD (coronary artery disease)    BPH (Benign Prostatic Hyperplasia)    CHF (congestive heart failure)    Hypothyroid    GERD (gastroesophageal reflux disease)    Gout    COPD (chronic obstructive pulmonary disease)    MI (myocardial infarction)      PSH:   Cataract    Hernia, inguinal, bilateral    Achilles rupture    S/P knee replacement, right    AAA (abdominal aortic aneurysm)    H/O repair of rotator cuff    S/P angioplasty    OUTPATIENT CARDIAC MEDS: Carvedilol 12.5 mg twice daily, Entresto 97/103 twice daily, digoxin 0.125 daily, Jardiance 25 mg daily, furosemide 40 mg daily, Synthroid 0.137 daily, simvastatin 10 daily.    Medications:   allopurinol 100 milliGRAM(s) Oral daily  aMIOdarone    Tablet 400 milliGRAM(s) Oral every 12 hours  ferrous    sulfate 325 milliGRAM(s) Oral daily  levothyroxine 125 MICROGram(s) Oral daily  metoprolol tartrate 12.5 milliGRAM(s) Oral two times a day  pantoprazole    Tablet 40 milliGRAM(s) Oral before breakfast  polyethylene glycol 3350 17 Gram(s) Oral daily  quiNIDine gluconate  milliGRAM(s) Oral every 12 hours  senna 2 Tablet(s) Oral at bedtime  simvastatin 10 milliGRAM(s) Oral at bedtime  tamsulosin 0.8 milliGRAM(s) Oral at bedtime      Allergies:  No Known Allergies    FAMILY HISTORY:  Family history of hemolytic uremic syndrome    Family history of CHF (congestive heart failure)      Social History: , fairly active despite restrictions from CHF and COPD  Smoking: Remote  Alcohol: No  Drugs: No    Review of Systems:  Constitutional: no recent weight loss  HEENT: no scleral icterus. Normal mucosa.  Respiratory: (+) COPD followed by Dr. Morris  Cardiovascular: see HPI  Gastrointestinal: No Abdominal Pain, no Diarrhea, no Constipation, no Nausea or Vomiting  Genitourinary: No Nocturia, Dysuria, or Incontinence. No hematuria.  BPH  Extremities: (+) edema. No cyanosis.  Neurologic: No Focal deficit. No Paresthesias. No Syncope. No seizures  Lymphatic: No Swelling. No Lymphadenopathy   Skin: No Rash,  Ecchymoses, Wounds, or Lesions  Psychiatry: No Depression. No Anxiety.    10 point review of systems is otherwise negative except as mentioned above            [ ]Unable to obtain    Physical Exam:  Vital Signs Last 24 Hrs  T(C): 36.6 (11 Jun 2021 04:37), Max: 36.6 (10 Yobani 2021 20:45)  T(F): 97.8 (11 Jun 2021 04:37), Max: 97.9 (10 Yobani 2021 20:45)  HR: 71 (11 Jun 2021 04:37) (69 - 74)  BP: 113/69 (11 Jun 2021 04:37) (100/62 - 113/69)  BP(mean): --  RR: 18 (11 Jun 2021 04:37) (18 - 18)  SpO2: 95% (11 Jun 2021 04:37) (94% - 96%)  Appearance: WD, WN, NAD. No more trouble with speech. No weakness etc. (+) Runs of  VT overnight and this AM up to 20 beats.  Eyes:  No scleral icterus. PERRL, EOMI  HENT: Normal oral mucosa.]NC/AT  Neck:  JVD 1/3 up at 90 degrees. Normal carotid upstrokes without bruits or murmurs.  Cardiovascular: Normal PMI. Normal S1, S2 physiologically split. No S3, (+) S4. 2/6 RODRÍGUEZ, 2-3/6 HSM apex.  Respiratory: Clear to auscultation bilaterally. No wheezes. No rales.  Gastrointestinal: Soft.  Non-tender. No hepatosplenomegally.  Extremities: No clubbing. No edema today. Pulses 2+ bilaterally symmetric except diminished pedal.  Musculoskeletal:  No joint deformity   Neurologic: Non-focal  Lymphatic:  No lymphadenopathy  Psychiatry: [+ ] AAOx3 [ +] Mood & affect appropriate  Skin: No rashes. No ecchymoses. No cyanosis    Cardiovascular Diagnostic Testing:  ECG:< from: 12 Lead ECG (06.07.21 @ 07:34) >  Ventricular Rate 69 BPM    Atrial Rate 69 BPM    QRS Duration 138 ms    Q-T Interval 421 ms    QTC Calculation(Bazett) 451 ms    P Axis 35 degrees    R Axis -74 degrees    T Axis 119 degrees    Diagnosis Line ELECTRONIC VENTRICULAR PACEMAKER  WHEN COMPARED WITH ECG OF `6/6/21 14:50  NO SIGNIFICANT CHANGE WAS FOUND  Confirmed by KAM Woodward Zlata ([id number]) on 6/7/2021 10:45:59 AM    < end of copied text >    < from: TTE with Doppler (w/Cont) (06.07.21 @ 07:37) >  YOB: 1927   Age: 89 (M)   MR#: [id number]  Study Date: 6/7/2021  [facility]upendra [facility] RDCS  Study quality: Technically fair  Referring Physician: Yvonne Brown MD  Blood Pressure: 106/55 mmHg  Height: 175 cm  Weight: 70 kg  BSA: 1.9 m2  ------------------------------------------------------------------------  PROCEDURE: Transthoracic echocardiogram with 2-D, M-Mode  and complete spectral and color flow Doppler. Verbal  consent was obtained for injection of  Ultrasonic Enhancing  Agent following a discussion of risks and benefits.  Following intravenous injection of Ultrasonic Enhancing  Agent , harmonic imaging was performed.  INDICATION: Syncope and collapse (R55)  ------------------------------------------------------------------------  Dimensions:    Normal Values:  LA:     5.2    2.0 - 4.0 cm  Ao:     3.4    2.0 - 3.8 cm  SEPTUM: 1.4    0.6 - 1.2 cm  PWT:    0.7    0.6 - 1.1 cm  LVIDd:  5.2    3.0 - 5.6 cm  LVIDs:  4.7    1.8 - 4.0 cm  Derived variables:  LVMI: 122 g/m2  RWT: 0.28  Fractional short: 10 %  EF (Visual Estimate): 30 %  Doppler Peak Velocity (m/sec): MV=1.4 AoV=2.5  ------------------------------------------------------------------------  Observations:  Mitral Valve: Tethered mitral valve leaflets with normal  opening. Mild mitral regurgitation.  Aortic Valve/Aorta: Calcified aortic valve with decreased  opening. Peak transaortic valve gradient equals 24 mm Hg,  mean transaortic valve gradient equals 12 mm Hg, estimated  aortic valve area equals 1 sqcm (by continuity equation),  aortic valve velocity time integral equals 47 cm,  consistent with moderate aortic stenosis. Mild aortic  regurgitation.  Peak left ventricular outflow tract  gradient equals 1 mm Hg, mean gradient is equal to 1 mm Hg,  LVOT velocity time integral equals 9 cm.  Aortic Root: 3.4 cm.  LVOT diameter: 2.6 cm.  Left Atrium: Moderately dilated left atrium.  LA volume  index = 48 cc/m2.  Left Ventricle: Severe segmental left ventricular systolic  dysfunction.  Akinetic basal inferior and inferolateral.  Diffuse hypokinesis of the inferior and inferolateral wall,  lateral wall.   Endocardial visualization enhanced with  intravenous injection of Ultrasonic Enhancing Agent  (Definity). No left ventricular thrombus. Mild left  ventricular enlargement. Severe  diastolic dysfunction  (Stage III).  Right Heart: Mild right atrial enlargement. Right  ventricular enlargement with decreased right ventricular  systolicfunction. A device wire is noted in the right  heart. Normal tricuspid valve. Minimal tricuspid  regurgitation. Normal pulmonic valve.  Pericardium/Pleura: Normal pericardium with no pericardial  effusion.  Hemodynamic: Estimated right atrial pressure is 8 mm Hg.  Estimated right ventricular systolic pressure equals 31 mm  Hg, assuming right atrial pressure equals 8 mm Hg,  consistent with normal pulmonary pressures.  ------------------------------------------------------------------------  Conclusions:  1. Calcified aortic valve with decreased opening. Peak  transaortic valve gradient equals 24 mm Hg, mean  transaortic valve gradient equals 12 mm Hg, estimated  aortic valve area equals 1 sqcm (by continuity equation),  aortic valve velocity time integral equals 47 cm,  consistent with moderate aortic stenosis.  2. Moderately dilated left atrium.  LA volume index = 48  cc/m2.  3. Mild left ventricular enlargement.  4. Severe segmental left ventricular systolic dysfunction.  Akinetic basalinferior and inferolateral.  Diffuse  hypokinesis of the inferior and inferolateral wall, lateral  wall.   Endocardial visualization enhanced with intravenous  injection of Ultrasonic Enhancing Agent (Definity). No left  ventricular thrombus.  5. Severe  diastolic dysfunction (Stage III).  6. Mild right atrial enlargement.  7. Right ventricular enlargement with decreased right  ventricular systolic function. A device wire is noted in  the right heart.  ------------------------------------------------------------------------  Confirmed on  6/7/2021 - 14:32:38 by TIERRA Ovalles  ------------------------------------------------------------------------    < end of copied text >    Echo: 3/23/2021 MAC with only mild MR.  Calcified aortic valve with decreased opening.  Peak gradient 31, mean 19, dimensionless index 0.20 and no AI noted this time.  Severe LAE.  Severe segmental LV systolic dysfunction with LVEF 23 to 25%.  Mild LVH.  Normal RV size and function with mild to moderate TR.  RVSP 54.  No pericardial effusion.       Stress Testing:< from: Stress Echocardiogram-Pharmacologic (10.09.18 @ 17:10) >  Observations:  Mitral Valve: Mitral annular calcification. Moderate mitral  regurgitation.  Aortic Valve/Aorta: Calcified trileaflet aortic valve with  decreased opening. Peak transaortic valve gradient equals  25 mm Hg, mean transaortic valve gradient equals 12 mm Hg,  estimated aortic valve area equals 0.9 sqcm (by continuity  equation), consistent with severe aortic stenosis. Mild  aortic regurgitation.  Peak left ventricular outflow tract  gradient equals 1 mm Hg, mean gradient is equal to 1 mm Hg.  Aortic Root: 3.6 cm.  LVOT diameter: 2.4 cm.  Left Atrium: Severely dilated left atrium.  LA volume index  = 51 cc/m2.  Left Ventricle: Severe segmental left ventricular systolic  dysfunction.  Severe hypokinesis/akinesis of the inferior  and inferolateral wall, basal inferoseptum, anterolateral  wall. Mild left ventricular enlargement.  Right Heart: Normal right atrium. A device wire is noted in  the right heart. Decreased right ventricular systolic  function. Normal tricuspid valve. Minimal tricuspid  regurgitation. Normal pulmonic valve.  Pericardium/Pleura: Normal pericardium with no pericardial  effusion.  Hemodynamic: Estimated right atrial pressure is 8 mm Hg.  Estimated right ventricular systolic pressure equals 33 mm  Hg, assuming right atrial pressure equals 8 mm Hg,  consistent with normal pulmonary pressures.  ------------------------------------------------------------------------  Pharmacologic Stress Test:  Agent:  Dobutamine Dose: 5  mcg/Kg/min over 0 min.  Baseline HR: 71 bpm  Peak HR: 74 bpm  % MPHR: 58 %  Baseline BP: 121/68 mmHg  Peak BP: 134/77 mmHg  Peak RPP: 9,916 (Rate Pressure Product)  Test terminated: Completion of test  Baseline EKG: Paced rhythm  EKG changes: Non diagnostic ECG.  Arrhythmia: None  Heart Rhythm: Paced  HR Response: HR failed to increase appropriately.  BP Response: Appropriate  Symptoms: None  ------------------------------------------------------------------------  Echocardiographic Study:  (A) Baseline echocardiogram reveals:  1. Moderate mitral regurgitation.  2. Calcified trileaflet aortic valve with decreased  opening. Peak transaortic valve gradient equals 25 mm Hg,  mean transaortic valve gradient equals 12 mm Hg, estimated  aortic valve area equals 0.9 sqcm (by continuity equation),  consistent with severe aortic stenosis. Mild aortic  regurgitation.  3. Left ventricular enlargement.  4. Severe segmental left ventricular systolic dysfunction.  Severe hypokinesis/akinesis of the inferior and  inferolateral wall, basal inferoseptum, anterolateral wall.  5. A device wire is noted in the right heart. Decreased  right ventricular systolic function.  (B) Stress echocardiogram reveals:  No significant change in left ventricular systolic  function.  ------------------------------------------------------------------------  Conclusions:  1. Normal hemodynamic response.  2. Non Diagnostic electrocardiographic response.  3. No significant change in left ventricular systolic  function.  4. HR failed to increase appropriately.  5. 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  Discussed with Dr. Kay.  ------------------------------------------------------------------------  Confirmed on  10/10/2018 - 17:48:23 by Jerome Johnson M.D.  ------------------------------------------------------------------------    < end of copied text >      Cath:< from: Cardiac Cath Lab - Adult (10.08.18 @ 15:55) >  CORONARY VESSELS: The coronary circulation is right dominant.  LM:   --  Proximal left main: There was a 40 % stenosis. 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  --  Distal left main: There was a stenosis. The lesion was eccentric.  LAD:   --  LAD: Angiography showed moderate atherosclerosis.  CX:   --  Circumflex: Angiography showed moderate atherosclerosis.  RCA:   --  RCA: Angiography showed moderate atherosclerosis.  --  Proximal RCA: There was a diffuse 30 % stenosis at the site of a prior  stent.  LEFT LOWER EXTREMITY VESSELS: Left external iliac: Angiography showed  aneurysmal dilatation. Left common femoral: The vessel was tortuous.  RIGHT LOWER EXTREMITY VESSELS: Right external iliac: Angiography showed  aneurysmal dilatation. Right common femoral: The vessel was excessively  totuos  < end of copied text >      Interpretation of Telemetry:    Imaging:   < from: Xray Chest 1 View- PORTABLE-Urgent (06.06.21 @ 08:38) >  COMPARISON: Chest x-ray from 5/14/2018.    FINDINGS:    The lungs are clear.  There is no pneumothorax or large pleural effusion.  Heart size cannot be accurately assessed in this projection. Dual lead pacemaker overlying the left chest wall.  No acute rib fractures or other osseous abnormality. Suture anchors noted in the left humeral head.    IMPRESSION:    Clear lungs.        < end of copied text >    Labs:                                                       7.4    6.08  )-----------( 76       ( 10 Yobani 2021 06:06 )             22.5     06-10    140  |  105  |  56<H>  ----------------------------<  92  4.0   |  24  |  2.27<H>    Ca    8.2<L>      10 Yobani 2021 06:19  Phos  2.9     06-10  Mg     2.2     06-10    TPro  5.7<L>  /  Alb  3.3  /  TBili  0.8  /  DBili  x   /  AST  11  /  ALT  12  /  AlkPhos  54  06-10                                                Patient seen and evaluated @820  Chief Complaint: Patient is a 89y old  Male who presents with a chief complaint of syncope (06 Jun 2021 09:36)      HPI:  89 M CAD s/p stents, low-flow low gradient AS, HFrEF 20% s/p MDT Bi-V ICD, COPD who presents with syncope x 2.    Pt reports usual state of health until yesterday night when he experienced an episode of syncope. His wife saw him in bed but he started to slide off the bed onto the floor. He denies any prodrome or feeling any chest pain, sob, palpitations, or shock from his ICD. This AM, the same scenario happened again which prompted the wife to call EMS.     On interrogation, it showed multiple episodes (24) of VT since 5/17 leading up to today that required ATP and shocks. The last one was 6/6/21 at 617AM which did not break with 2 ATPs and ultimately led to shock. (06 Jun 2021 11:40)    CARDIAC HISTORY: I have known the patient since 2006.  He had an MI and angioplasty in 1994.  Had a cath in 2011 with a 40% aneurysmal left main normal LAD and circumflex with a 95% right lesion and an akinetic inferior wall.  Treated medically and did well other than an admission for cellulitis in 2013 with positive blood cultures.  No endocarditis.  Issues with shortness of breath both from COPD and CHF.  Able to undergo knee replacement in 2014 and then later endovascular repair of an abdominal aortic aneurysm in October 2014.  Stress echo in 2015 showed an ejection fraction of 27%.  Cath revealed unchanged coronaries but LVEF 35%.  The right coronary was stented but did not change his LVEF so we had a defibrillator and biventricular pacemaker placed by Dr. Russell in August 2015.  I changed his Avapro to Entresto and the patient's CHF seem to improve.  August 25 of 2017 had a syncopal episode in the bathroom.  He had no idea what happened but interrogation showed V. tach followed by V. fib which led to a defibrillator shock.  No recurrence and no AICD shocks since then until this admission.  2019 had a couple of runs of ventricular tachycardia that were paced terminated.  98% of the time biventricular pacing.  2021 episode of atrial fibrillation on interrogation that lasted an hour and 48 minutes.  Progressive heart failure and he was worked up for possible TAVR with low gradient aortic stenosis including a dobutamine echo and was found not to be a candidate.  Has had progressive shortness of breath and LV dysfunction with adjusting of his medications and recently adding Jardiance and then increasing it from 10-25.  Issues with creatinine running between 2 and 2.5.  BNP as high as 18,000.  Most recent echo was March 23 of this year with an aortic gradient of 31 peak and 19 mean, dimensionless index 0.20 and no AI.  Severe segmental LV systolic dysfunction with LVEF 23 to 25%.  Mild LVH.  Only mild MR and mild to moderate TR with RVSP 54.    PMH:   HTN (hypertension)    HLD (hyperlipidemia)    CAD (coronary artery disease)    BPH (Benign Prostatic Hyperplasia)    CHF (congestive heart failure)    Hypothyroid    GERD (gastroesophageal reflux disease)    Gout    COPD (chronic obstructive pulmonary disease)    MI (myocardial infarction)      PSH:   Cataract    Hernia, inguinal, bilateral    Achilles rupture    S/P knee replacement, right    AAA (abdominal aortic aneurysm)    H/O repair of rotator cuff    S/P angioplasty    OUTPATIENT CARDIAC MEDS: Carvedilol 12.5 mg twice daily, Entresto 97/103 twice daily, digoxin 0.125 daily, Jardiance 25 mg daily, furosemide 40 mg daily, Synthroid 0.137 daily, simvastatin 10 daily.    Medications:   allopurinol 100 milliGRAM(s) Oral daily  aMIOdarone    Tablet 400 milliGRAM(s) Oral every 12 hours  ferrous    sulfate 325 milliGRAM(s) Oral daily  levothyroxine 125 MICROGram(s) Oral daily  metoprolol tartrate 12.5 milliGRAM(s) Oral two times a day  pantoprazole    Tablet 40 milliGRAM(s) Oral before breakfast  polyethylene glycol 3350 17 Gram(s) Oral daily  quiNIDine gluconate  milliGRAM(s) Oral every 12 hours  senna 2 Tablet(s) Oral at bedtime  simvastatin 10 milliGRAM(s) Oral at bedtime  tamsulosin 0.8 milliGRAM(s) Oral at bedtime      Allergies:  No Known Allergies    FAMILY HISTORY:  Family history of hemolytic uremic syndrome    Family history of CHF (congestive heart failure)      Social History: , fairly active despite restrictions from CHF and COPD  Smoking: Remote  Alcohol: No  Drugs: No    Review of Systems:  Constitutional: no recent weight loss  HEENT: no scleral icterus. Normal mucosa.  Respiratory: (+) COPD followed by Dr. Morris  Cardiovascular: see HPI  Gastrointestinal: No Abdominal Pain, no Diarrhea, no Constipation, no Nausea or Vomiting  Genitourinary: No Nocturia, Dysuria, or Incontinence. No hematuria.  BPH  Extremities: (+) edema. No cyanosis.  Neurologic: No Focal deficit. No Paresthesias. No Syncope. No seizures  Lymphatic: No Swelling. No Lymphadenopathy   Skin: No Rash,  Ecchymoses, Wounds, or Lesions  Psychiatry: No Depression. No Anxiety.    10 point review of systems is otherwise negative except as mentioned above            [ ]Unable to obtain    Physical Exam:  Vital Signs Last 24 Hrs  T(C): 36.6 (11 Jun 2021 04:37), Max: 36.6 (10 Yobani 2021 20:45)  T(F): 97.8 (11 Jun 2021 04:37), Max: 97.9 (10 Yobani 2021 20:45)  HR: 71 (11 Jun 2021 04:37) (69 - 74)  BP: 113/69 (11 Jun 2021 04:37) (100/62 - 113/69)  BP(mean): --  RR: 18 (11 Jun 2021 04:37) (18 - 18)  SpO2: 95% (11 Jun 2021 04:37) (94% - 96%)  Appearance: WD, WN, NAD. No more trouble with speech. No weakness etc. (+) 3 Runs of  VT overnight and this AM 5, 7, and 15 beats.  Eyes:  No scleral icterus. PERRL, EOMI  HENT: Normal oral mucosa.]NC/AT  Neck:  JVD 1/3 up at 90 degrees. Normal carotid upstrokes without bruits or murmurs.  Cardiovascular: Normal PMI. Normal S1, S2 physiologically split. No S3, (+) S4. 2/6 RODRÍGUEZ, 2-3/6 HSM apex.  Respiratory: Clear to auscultation bilaterally. No wheezes. No rales.  Gastrointestinal: Soft.  Non-tender. No hepatosplenomegally.  Extremities: No clubbing. No edema today. Pulses 2+ bilaterally symmetric except diminished pedal.  Musculoskeletal:  No joint deformity   Neurologic: Non-focal  Lymphatic:  No lymphadenopathy  Psychiatry: [+ ] AAOx3 [ +] Mood & affect appropriate  Skin: No rashes. No ecchymoses. No cyanosis    Cardiovascular Diagnostic Testing:  ECG:< from: 12 Lead ECG (06.07.21 @ 07:34) >  Ventricular Rate 69 BPM    Atrial Rate 69 BPM    QRS Duration 138 ms    Q-T Interval 421 ms    QTC Calculation(Bazett) 451 ms    P Axis 35 degrees    R Axis -74 degrees    T Axis 119 degrees    Diagnosis Line ELECTRONIC VENTRICULAR PACEMAKER  WHEN COMPARED WITH ECG OF `6/6/21 14:50  NO SIGNIFICANT CHANGE WAS FOUND  Confirmed by KAM Woodward Zlata ([id number]) on 6/7/2021 10:45:59 AM    < end of copied text >    < from: TTE with Doppler (w/Cont) (06.07.21 @ 07:37) >  YOB: 1927   Age: 89 (M)   MR#: [id number]  Study Date: 6/7/2021  [facility] RDCS  Study quality: Technically fair  Referring Physician: Yvonne Brown MD  Blood Pressure: 106/55 mmHg  Height: 175 cm  Weight: 70 kg  BSA: 1.9 m2  ------------------------------------------------------------------------  PROCEDURE: Transthoracic echocardiogram with 2-D, M-Mode  and complete spectral and color flow Doppler. Verbal  consent was obtained for injection of  Ultrasonic Enhancing  Agent following a discussion of risks and benefits.  Following intravenous injection of Ultrasonic Enhancing  Agent , harmonic imaging was performed.  INDICATION: Syncope and collapse (R55)  ------------------------------------------------------------------------  Dimensions:    Normal Values:  LA:     5.2    2.0 - 4.0 cm  Ao:     3.4    2.0 - 3.8 cm  SEPTUM: 1.4    0.6 - 1.2 cm  PWT:    0.7    0.6 - 1.1 cm  LVIDd:  5.2    3.0 - 5.6 cm  LVIDs:  4.7    1.8 - 4.0 cm  Derived variables:  LVMI: 122 g/m2  RWT: 0.28  Fractional short: 10 %  EF (Visual Estimate): 30 %  Doppler Peak Velocity (m/sec): MV=1.4 AoV=2.5  ------------------------------------------------------------------------  Observations:  Mitral Valve: Tethered mitral valve leaflets with normal  opening. Mild mitral regurgitation.  Aortic Valve/Aorta: Calcified aortic valve with decreased  opening. Peak transaortic valve gradient equals 24 mm Hg,  mean transaortic valve gradient equals 12 mm Hg, estimated  aortic valve area equals 1 sqcm (by continuity equation),  aortic valve velocity time integral equals 47 cm,  consistent with moderate aortic stenosis. Mild aortic  regurgitation.  Peak left ventricular outflow tract  gradient equals 1 mm Hg, mean gradient is equal to 1 mm Hg,  LVOT velocity time integral equals 9 cm.  Aortic Root: 3.4 cm.  LVOT diameter: 2.6 cm.  Left Atrium: Moderately dilated left atrium.  LA volume  index = 48 cc/m2.  Left Ventricle: Severe segmental left ventricular systolic  dysfunction.  Akinetic basal inferior and inferolateral.  Diffuse hypokinesis of the inferior and inferolateral wall,  lateral wall.   Endocardial visualization enhanced with  intravenous injection of Ultrasonic Enhancing Agent  (Definity). No left ventricular thrombus. Mild left  ventricular enlargement. Severe  diastolic dysfunction  (Stage III).  Right Heart: Mild right atrial enlargement. Right  ventricular enlargement with decreased right ventricular  systolicfunction. A device wire is noted in the right  heart. Normal tricuspid valve. Minimal tricuspid  regurgitation. Normal pulmonic valve.  Pericardium/Pleura: Normal pericardium with no pericardial  effusion.  Hemodynamic: Estimated right atrial pressure is 8 mm Hg.  Estimated right ventricular systolic pressure equals 31 mm  Hg, assuming right atrial pressure equals 8 mm Hg,  consistent with normal pulmonary pressures.  ------------------------------------------------------------------------  Conclusions:  1. Calcified aortic valve with decreased opening. Peak  transaortic valve gradient equals 24 mm Hg, mean  transaortic valve gradient equals 12 mm Hg, estimated  aortic valve area equals 1 sqcm (by continuity equation),  aortic valve velocity time integral equals 47 cm,  consistent with moderate aortic stenosis.  2. Moderately dilated left atrium.  LA volume index = 48  cc/m2.  3. Mild left ventricular enlargement.  4. Severe segmental left ventricular systolic dysfunction.  Akinetic basalinferior and inferolateral.  Diffuse  hypokinesis of the inferior and inferolateral wall, lateral  wall.   Endocardial visualization enhanced with intravenous  injection of Ultrasonic Enhancing Agent (Definity). No left  ventricular thrombus.  5. Severe  diastolic dysfunction (Stage III).  6. Mild right atrial enlargement.  7. Right ventricular enlargement with decreased right  ventricular systolic function. A device wire is noted in  the right heart.  ------------------------------------------------------------------------  Confirmed on  6/7/2021 - 14:32:38 by TIERRA Ovalles  ------------------------------------------------------------------------    < end of copied text >    Echo: 3/23/2021 MAC with only mild MR.  Calcified aortic valve with decreased opening.  Peak gradient 31, mean 19, dimensionless index 0.20 and no AI noted this time.  Severe LAE.  Severe segmental LV systolic dysfunction with LVEF 23 to 25%.  Mild LVH.  Normal RV size and function with mild to moderate TR.  RVSP 54.  No pericardial effusion.       Stress Testing:< from: Stress Echocardiogram-Pharmacologic (10.09.18 @ 17:10) >  Observations:  Mitral Valve: Mitral annular calcification. Moderate mitral  regurgitation.  Aortic Valve/Aorta: Calcified trileaflet aortic valve with  decreased opening. Peak transaortic valve gradient equals  25 mm Hg, mean transaortic valve gradient equals 12 mm Hg,  estimated aortic valve area equals 0.9 sqcm (by continuity  equation), consistent with severe aortic stenosis. Mild  aortic regurgitation.  Peak left ventricular outflow tract  gradient equals 1 mm Hg, mean gradient is equal to 1 mm Hg.  Aortic Root: 3.6 cm.  LVOT diameter: 2.4 cm.  Left Atrium: Severely dilated left atrium.  LA volume index  = 51 cc/m2.  Left Ventricle: Severe segmental left ventricular systolic  dysfunction.  Severe hypokinesis/akinesis of the inferior  and inferolateral wall, basal inferoseptum, anterolateral  wall. Mild left ventricular enlargement.  Right Heart: Normal right atrium. A device wire is noted in  the right heart. Decreased right ventricular systolic  function. Normal tricuspid valve. Minimal tricuspid  regurgitation. Normal pulmonic valve.  Pericardium/Pleura: Normal pericardium with no pericardial  effusion.  Hemodynamic: Estimated right atrial pressure is 8 mm Hg.  Estimated right ventricular systolic pressure equals 33 mm  Hg, assuming right atrial pressure equals 8 mm Hg,  consistent with normal pulmonary pressures.  ------------------------------------------------------------------------  Pharmacologic Stress Test:  Agent:  Dobutamine Dose: 5  mcg/Kg/min over 0 min.  Baseline HR: 71 bpm  Peak HR: 74 bpm  % MPHR: 58 %  Baseline BP: 121/68 mmHg  Peak BP: 134/77 mmHg  Peak RPP: 9,916 (Rate Pressure Product)  Test terminated: Completion of test  Baseline EKG: Paced rhythm  EKG changes: Non diagnostic ECG.  Arrhythmia: None  Heart Rhythm: Paced  HR Response: HR failed to increase appropriately.  BP Response: Appropriate  Symptoms: None  ------------------------------------------------------------------------  Echocardiographic Study:  (A) Baseline echocardiogram reveals:  1. Moderate mitral regurgitation.  2. Calcified trileaflet aortic valve with decreased  opening. Peak transaortic valve gradient equals 25 mm Hg,  mean transaortic valve gradient equals 12 mm Hg, estimated  aortic valve area equals 0.9 sqcm (by continuity equation),  consistent with severe aortic stenosis. Mild aortic  regurgitation.  3. Left ventricular enlargement.  4. Severe segmental left ventricular systolic dysfunction.  Severe hypokinesis/akinesis of the inferior and  inferolateral wall, basal inferoseptum, anterolateral wall.  5. A device wire is noted in the right heart. Decreased  right ventricular systolic function.  (B) Stress echocardiogram reveals:  No significant change in left ventricular systolic  function.  ------------------------------------------------------------------------  Conclusions:  1. Normal hemodynamic response.  2. Non Diagnostic electrocardiographic response.  3. No significant change in left ventricular systolic  function.  4. HR failed to increase appropriately.  5. 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  Discussed with Dr. Kay.  ------------------------------------------------------------------------  Confirmed on  10/10/2018 - 17:48:23 by Jerome Johnson M.D.  ------------------------------------------------------------------------    < end of copied text >      Cath:< from: Cardiac Cath Lab - Adult (10.08.18 @ 15:55) >  CORONARY VESSELS: The coronary circulation is right dominant.  LM:   --  Proximal left main: There was a 40 % stenosis. 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  --  Distal left main: There was a stenosis. The lesion was eccentric.  LAD:   --  LAD: Angiography showed moderate atherosclerosis.  CX:   --  Circumflex: Angiography showed moderate atherosclerosis.  RCA:   --  RCA: Angiography showed moderate atherosclerosis.  --  Proximal RCA: There was a diffuse 30 % stenosis at the site of a prior  stent.  LEFT LOWER EXTREMITY VESSELS: Left external iliac: Angiography showed  aneurysmal dilatation. Left common femoral: The vessel was tortuous.  RIGHT LOWER EXTREMITY VESSELS: Right external iliac: Angiography showed  aneurysmal dilatation. Right common femoral: The vessel was excessively  totuos  < end of copied text >      Interpretation of Telemetry:    Imaging:   < from: Xray Chest 1 View- PORTABLE-Urgent (06.06.21 @ 08:38) >  COMPARISON: Chest x-ray from 5/14/2018.    FINDINGS:    The lungs are clear.  There is no pneumothorax or large pleural effusion.  Heart size cannot be accurately assessed in this projection. Dual lead pacemaker overlying the left chest wall.  No acute rib fractures or other osseous abnormality. Suture anchors noted in the left humeral head.    IMPRESSION:    Clear lungs.        < end of copied text >    Labs:                                                      8.2    6.30  )-----------( 84       ( 11 Jun 2021 07:09 )             24.9     06-11    140  |  106  |  57<H>  ----------------------------<  85  4.1   |  23  |  2.19<H>    Ca    8.4      11 Jun 2021 07:09  Phos  2.5     06-11  Mg     2.2     06-11    TPro  5.7<L>  /  Alb  3.3  /  TBili  0.8  /  DBili  x   /  AST  11  /  ALT  12  /  AlkPhos  54  06-10                                                                        Weakness

## 2022-10-30 NOTE — H&P ADULT - PROBLEM SELECTOR PLAN 5
#Asthma/ ? COPD  - on 2L O2, saturating 97%   - patient's son reports possible copd diagnosis, former smoker   - documentation reports asthma hx  - no wheezing on exam   - continue with duoneb prn, on proair prn at home   - wean off O2 ENZO on CKD (baseline ~Cr 2.09), on admission CR 3.89  - likely pre-renal 2/2 dehydration/ poor PO intake   - IVF as above  - monitor strict I/Os  - urine Creatinine and urine Na pending ENZO on CKD (baseline ~Cr 2.09), on admission CR 3.89, FeUrea 52.1% (intrinsic) FeNa: 0.9% (pre-renal)  I/s/o dehydration 2/2 poor PO intake  - IVF as above  - monitor strict I/Os

## 2022-10-30 NOTE — H&P ADULT - PROBLEM SELECTOR PLAN 7
- continue lipitor 20mg qhs - on amlodipine 10mg at home, continue as BP tolerates  - on imdur 30mg extended release, continue as BP tolerates

## 2022-10-30 NOTE — H&P ADULT - PROBLEM SELECTOR PLAN 10
F: LR 150cc/hr  E: Replete Mg>2, K>4  N: NPO  P: F: LR 150cc/hr  E: Replete Mg>2, K>4  N: NPO  P: Lovenox 30mg F: LR 150cc/hr  E: Replete Mg>2, K>4  N: NPO  P: Heparin 5000u q12h

## 2022-10-30 NOTE — H&P ADULT - PROBLEM SELECTOR PLAN 1
DDx for hypercalcemia includes severe dehydration, immobility, multiple myeloma, hypercalcemia 2/2 undiagnosed malignancy, hypothyroidism. S/p 1L bolus of LR. Patient with significant hypovolemia on exam  Total protein 3.3 (L), Protein gap 2.1 (albumin 1.2). TSH 2.36 (wnl)   - LR @150cc/hr   - calcitonin 160u q12h, for 2 doses  - monitor urine output closely, strict I/Os   - f/u: PTH, PTHrP, ionized calcium, albumin, SPEP, UPEP, Vit D DDx for hypercalcemia includes severe dehydration, immobility, multiple myeloma, hypercalcemia 2/2 undiagnosed malignancy, hypothyroidism. S/p 1L bolus of LR. Patient with significant hypovolemia on exam  Corrected Calcium 16.1-->15.8. Total protein 3.3 (L), Protein gap 2.1 (albumin 1.2). TSH 2.36 (wnl)   UA showed few calcium oxalate crystals.   - LR @150cc/hr   - calcitonin 160u q12h, for 2 doses  - monitor urine output closely, strict I/Os   - f/u: PTH, PTHrP, ionized calcium, albumin, SPEP, UPEP, Vit D

## 2022-10-30 NOTE — H&P ADULT - PROBLEM SELECTOR PLAN 4
- likely pre-renal 2/2 dehydration/ poor PO intake   - IVF as above  - monitor strict I/Os  - urine Creatinine and urine Na pending RUE with pitting edema to shoulder, possible DVT. Wells 4.5 (+3 for likely DVT RUE, +1.5 for HR>100)  - f/u RUE Doppler  - f/u D-dimer RUE with pitting edema to shoulder, possible DVT. Wells 4.5 (+3 for likely DVT RUE, +1.5 for HR>100). Ddimer 511  - f/u RUE Doppler

## 2022-10-30 NOTE — H&P ADULT - NSHPLABSRESULTS_GEN_ALL_CORE
9.6    7.88  )-----------( 262      ( 30 Oct 2022 13:33 )             30.9       10    144  |  100  |  66<H>  ----------------------------<  133<H>  4.4   |  36<H>  |  3.96<H>    Ca    13.9<HH>      30 Oct 2022 13:34  Phos  2.5     10-30  Mg     2.0     10-30    TPro  3.3<L>  /  Alb  1.2<L>  /  TBili  0.2  /  DBili  0.2  /  AST  5<L>  /  ALT  <5<L>  /  AlkPhos  40  1030         CAPILLARY BLOOD GLUCOSE               CARDIAC MARKERS ( 30 Oct 2022 17:35 )  x     / 0.04 ng/mL / 12 U/L / x     / <1.0 ng/mL  CARDIAC MARKERS ( 30 Oct 2022 13:34 )  x     / 0.09 ng/mL / x     / x     / x                Lactate Trend  10-30 @ 13:33 Lactate:1.5       Urinalysis Basic - ( 30 Oct 2022 18:21 )    Color: Yellow / Appearance: Clear / S.020 / pH: x  Gluc: x / Ketone: NEGATIVE  / Bili: Negative / Urobili: 0.2 E.U./dL   Blood: x / Protein: NEGATIVE mg/dL / Nitrite: POSITIVE   Leuk Esterase: Small / RBC: x / WBC x   Sq Epi: x / Non Sq Epi: x / Bacteria: x              RADIOLOGY, EKG & ADDITIONAL TESTS: Reviewed.

## 2022-10-30 NOTE — ED PROVIDER NOTE - PHYSICAL EXAMINATION
CONST: cachectic, chronically ill-appearing  HEAD: atraumatic  EYES: conjunctivae clear, PERRL, EOMI  ENT: dry mucous membranes  NECK: supple/FROM, nttp  CARD: RRR  CHEST: decreased BS, no stridor/retractions/tripoding  ABD: soft, nd, nttp, no rebound/guarding  EXT: RUE swollen, no edema to LUE or b/l LE  SKIN: warm, dry, no rash, cap refill <2sec  NEURO: awake, moving all extremities spontaneously, following simple commands

## 2022-10-30 NOTE — CONSULT NOTE ADULT - SUBJECTIVE AND OBJECTIVE BOX
ICU CONSULT NOTE    HPI:  82yo, Cantonese/English speaking, former smoker male PMHx HTN, asthma vs COPD (?) (no hospitalizations/intubations) HLD, DM2, HFpEF, presents with fatigue and weakness for two weeks. Per son at bedside, who is historian, patient has been feeling out of it, is less conversational and           ED Course:   Consult reason: symptomatic hypercalcemia   ED vitals: T afebrile  HR 58  RR  /78 SpO2 97 on 2L   Labs significant ~Ca 13, BUN 66, Cr 3.96 (baseline ~2)   Imaging/EKG: EKG: sinus bradycardia, CXR: RLL opacity   Interventions: LR 500cc bolus       Allergies    No Known Allergies    Intolerances      Home Medications:  albuterol 0.63 mg/3 mL (0.021%) inhalation solution: 3 milliliter(s) inhaled 3 times a day, As Needed (09 Oct 2022 18:26)  amLODIPine 10 mg oral tablet: 1 tab(s) orally once a day (09 Oct 2022 18:26)  aspirin 81 mg oral delayed release tablet: 1 tab(s) orally once a day (14 Oct 2022 14:49)  atorvastatin 20 mg oral tablet: 1 tab(s) orally once a day (at bedtime) (14 Oct 2022 14:49)  Imdur 30 mg oral tablet, extended release: 1 tab(s) orally once a day (in the morning) (09 Oct 2022 18:26)  Multiple Vitamins oral tablet: 1 tab(s) orally once a day (14 Oct 2022 14:49)  ranolazine 500 mg oral tablet, extended release: 1 tab(s) orally 2 times a day (09 Oct 2022 18:26)      SOCIAL HX:     Smoking          ETOH/Illicit drugs          Occupation    PAST MEDICAL & SURGICAL HISTORY:  DM (diabetes mellitus)      HTN (hypertension)      HLD (hyperlipidemia)      No significant past surgical history          FAMILY HISTORY:  :    No known cardiovascular or pulmonary family history     ROS:  See HPI     PHYSICAL EXAM    ICU Vital Signs Last 24 Hrs  T(C): 36.9 (30 Oct 2022 13:15), Max: 36.9 (30 Oct 2022 13:15)  T(F): 98.4 (30 Oct 2022 13:15), Max: 98.4 (30 Oct 2022 13:15)  HR: 58 (30 Oct 2022 13:15) (58 - 58)  BP: 145/65 (30 Oct 2022 13:15) (123/78 - 145/65)  BP(mean): --  ABP: --  ABP(mean): --  RR: 20 (30 Oct 2022 13:15) (20 - 20)  SpO2: 97% (30 Oct 2022 13:15) (97% - 97%)    O2 Parameters below as of 30 Oct 2022 13:15  Patient On (Oxygen Delivery Method): nasal cannula  O2 Flow (L/min): 2          General: Not in distress  HEENT:  EARLENE              Lymphatic system: No LN  Lungs: Bilateral BS  Cardiovascular: Regular  Gastrointestinal: Soft, Positive BS  Musculoskeletal: No clubbing.  Moves all extremities.    Skin: Warm.  Intact  Neurological: No motor or sensory deficit         LABS:                          9.6    7.88  )-----------( 262      ( 30 Oct 2022 13:33 )             30.9                                               10-30    144  |  100  |  66<H>  ----------------------------<  133<H>  4.4   |  36<H>  |  3.96<H>    Ca    13.9<HH>      30 Oct 2022 13:34                                                   CARDIAC MARKERS ( 30 Oct 2022 13:34 )  x     / 0.09 ng/mL / x     / x     / x                                                                                                                                                                                  CXR:    ECHO:    MEDICATIONS  (STANDING):  lactated ringers Bolus 500 milliLiter(s) IV Bolus once  lactated ringers. 1000 milliLiter(s) (150 mL/Hr) IV Continuous <Continuous>    MEDICATIONS  (PRN):  acetaminophen     Tablet .. 650 milliGRAM(s) Oral every 6 hours PRN Temp greater or equal to 38C (100.4F), Mild Pain (1 - 3)  aluminum hydroxide/magnesium hydroxide/simethicone Suspension 30 milliLiter(s) Oral every 4 hours PRN Dyspepsia  melatonin 3 milliGRAM(s) Oral at bedtime PRN Insomnia  ondansetron Injectable 4 milliGRAM(s) IV Push every 8 hours PRN Nausea and/or Vomiting         ICU CONSULT NOTE    HPI:  80yo, Cantonese/English speaking, former smoker male PMHx HTN, asthma vs COPD (?) (no hospitalizations/intubations) HLD, DM2, HFpEF, presents with fatigue and weakness for two weeks. Per son at bedside, who is historian, patient has been feeling out of it, is less conversational and has reported some SOB. Patient was recently admitted a couple weeks ago to [facility] fo mechanical fall, and was admitted to cardiac telemetry for diastolic heart failure and IV lasix was given, TTE revealed normal EF. Patient was discharged to [facility] and has been at rehab for two weeks. At rehab, son reports patient with poor appetite / PO intake for two weeks. He has progressively gotten weaker. Patient's son reports prior to rehab, father lived at home by himself, independent in most ADLs. His other son went over 2 days per week. He reports chronic smoking and believes patient was diagnosed with COPD but is not sure, he uses pro-air occasionally.     ICU consult called for hypercalcemia, ~13.9. Patient has prior hypercalcemia, mild to 11. Not worked up. Son states patient was on hctz, but not on rehab med list. At rehab unclear if he was given diuresis but were concerned about hypocalcemia. Patient is currently AAOX2 (name and place), denies focal symptoms. Drowsy appearing. Denies chest pain, palpitations, dizziness, vomiting, abd pain, N/D, dysuria, HA.     ED Course:   Consult reason: symptomatic hypercalcemia   ED vitals: T afebrile  HR 58  RR  /78 SpO2 97 on 2L   Labs significant ~Ca 13, BUN 66, Cr 3.96 (baseline ~2)   Imaging/EKG: EKG: sinus bradycardia, CXR: RLL opacity   Interventions: LR 500cc bolus     Given change in mental status from baseline per family and lethargy will admit to telemetry for hypercalcemia work up and management,.     Allergies    No Known Allergies    Intolerances      Home Medications:  albuterol 0.63 mg/3 mL (0.021%) inhalation solution: 3 milliliter(s) inhaled 3 times a day, As Needed (09 Oct 2022 18:26)  amLODIPine 10 mg oral tablet: 1 tab(s) orally once a day (09 Oct 2022 18:26)  aspirin 81 mg oral delayed release tablet: 1 tab(s) orally once a day (14 Oct 2022 14:49)  atorvastatin 20 mg oral tablet: 1 tab(s) orally once a day (at bedtime) (14 Oct 2022 14:49)  Imdur 30 mg oral tablet, extended release: 1 tab(s) orally once a day (in the morning) (09 Oct 2022 18:26)  Multiple Vitamins oral tablet: 1 tab(s) orally once a day (14 Oct 2022 14:49)  ranolazine 500 mg oral tablet, extended release: 1 tab(s) orally 2 times a day (09 Oct 2022 18:26)      SOCIAL HX:     Smoking          ETOH/Illicit drugs          Occupation    PAST MEDICAL & SURGICAL HISTORY:  DM (diabetes mellitus)      HTN (hypertension)      HLD (hyperlipidemia)      No significant past surgical history          FAMILY HISTORY:  :    No known cardiovascular or pulmonary family history     ROS:  See HPI     PHYSICAL EXAM    ICU Vital Signs Last 24 Hrs  T(C): 36.9 (30 Oct 2022 13:15), Max: 36.9 (30 Oct 2022 13:15)  T(F): 98.4 (30 Oct 2022 13:15), Max: 98.4 (30 Oct 2022 13:15)  HR: 58 (30 Oct 2022 13:15) (58 - 58)  BP: 145/65 (30 Oct 2022 13:15) (123/78 - 145/65)  BP(mean): --  ABP: --  ABP(mean): --  RR: 20 (30 Oct 2022 13:15) (20 - 20)  SpO2: 97% (30 Oct 2022 13:15) (97% - 97%)    O2 Parameters below as of 30 Oct 2022 13:15  Patient On (Oxygen Delivery Method): nasal cannula  O2 Flow (L/min): 2          General: Not in distress, cachectic, dry MM   HEENT:  EARLENE              Lymphatic system: No LN  Lungs: Bilateral BS  Cardiovascular: Regular  Gastrointestinal: Soft, Positive BS  Musculoskeletal: No clubbing.  Moves all extremities.    Skin: Warm.  Intact  Neurological: No motor or sensory deficit         LABS:                          9.6    7.88  )-----------( 262      ( 30 Oct 2022 13:33 )             30.9                                               10-30    144  |  100  |  66<H>  ----------------------------<  133<H>  4.4   |  36<H>  |  3.96<H>    Ca    13.9<HH>      30 Oct 2022 13:34                                                   CARDIAC MARKERS ( 30 Oct 2022 13:34 )  x     / 0.09 ng/mL / x     / x     / x                                                                                                                                                                                  CXR:    ECHO:    MEDICATIONS  (STANDING):  lactated ringers Bolus 500 milliLiter(s) IV Bolus once  lactated ringers. 1000 milliLiter(s) (150 mL/Hr) IV Continuous <Continuous>    MEDICATIONS  (PRN):  acetaminophen     Tablet .. 650 milliGRAM(s) Oral every 6 hours PRN Temp greater or equal to 38C (100.4F), Mild Pain (1 - 3)  aluminum hydroxide/magnesium hydroxide/simethicone Suspension 30 milliLiter(s) Oral every 4 hours PRN Dyspepsia  melatonin 3 milliGRAM(s) Oral at bedtime PRN Insomnia  ondansetron Injectable 4 milliGRAM(s) IV Push every 8 hours PRN Nausea and/or Vomiting         ICU CONSULT NOTE    HPI:  80yo, Cantonese/English speaking, former smoker male PMHx HTN, asthma vs COPD (?) (no hospitalizations/intubations) HLD, DM2, HFpEF, presents with fatigue and weakness for two weeks. Per son at bedside, who is historian, patient has been feeling out of it, is less conversational and has reported some SOB. Patient was recently admitted a couple weeks ago to [facility] fo mechanical fall, and was admitted to cardiac telemetry for diastolic heart failure and IV lasix was given, TTE revealed normal EF. Patient was discharged to [facility] and has been at rehab for two weeks. At rehab, son reports patient with poor appetite / PO intake for two weeks. He has progressively gotten weaker. Patient's son reports prior to rehab, father lived at home by himself, independent in most ADLs. His other son went over 2 days per week. He reports chronic smoking and believes patient was diagnosed with COPD but is not sure, he uses pro-air occasionally.     ICU consult called for hypercalcemia, ~13.9. Patient has prior hypercalcemia, mild to 11. Not worked up. Son states patient was on hctz, but not on rehab med list. At rehab unclear if he was given diuresis but were concerned about hypocalcemia. Patient is currently AAOX2 (name and place), denies focal symptoms. Drowsy appearing. Denies chest pain, palpitations, dizziness, vomiting, abd pain, N/D, dysuria, HA.     ED Course:   Consult reason: symptomatic hypercalcemia   ED vitals: T afebrile  HR 58  RR  /78 SpO2 97 on 2L   Labs significant ~Ca 13, BUN 66, Cr 3.96 (baseline ~2)   Imaging/EKG: EKG: sinus bradycardia, CXR: RLL opacity   Interventions: LR 500cc bolus     Given change in mental status from baseline per family and lethargy will admit to telemetry for hypercalcemia work up and management,.     Allergies    No Known Allergies    Intolerances      Home Medications:  albuterol 0.63 mg/3 mL (0.021%) inhalation solution: 3 milliliter(s) inhaled 3 times a day, As Needed (09 Oct 2022 18:26)  amLODIPine 10 mg oral tablet: 1 tab(s) orally once a day (09 Oct 2022 18:26)  aspirin 81 mg oral delayed release tablet: 1 tab(s) orally once a day (14 Oct 2022 14:49)  atorvastatin 20 mg oral tablet: 1 tab(s) orally once a day (at bedtime) (14 Oct 2022 14:49)  Imdur 30 mg oral tablet, extended release: 1 tab(s) orally once a day (in the morning) (09 Oct 2022 18:26)  Multiple Vitamins oral tablet: 1 tab(s) orally once a day (14 Oct 2022 14:49)  ranolazine 500 mg oral tablet, extended release: 1 tab(s) orally 2 times a day (09 Oct 2022 18:26)      SOCIAL HX:     Smoking          ETOH/Illicit drugs          Occupation    PAST MEDICAL & SURGICAL HISTORY:  DM (diabetes mellitus)      HTN (hypertension)      HLD (hyperlipidemia)      No significant past surgical history          FAMILY HISTORY:  :    No known cardiovascular or pulmonary family history     ROS:  See HPI     PHYSICAL EXAM    ICU Vital Signs Last 24 Hrs  T(C): 36.9 (30 Oct 2022 13:15), Max: 36.9 (30 Oct 2022 13:15)  T(F): 98.4 (30 Oct 2022 13:15), Max: 98.4 (30 Oct 2022 13:15)  HR: 58 (30 Oct 2022 13:15) (58 - 58)  BP: 145/65 (30 Oct 2022 13:15) (123/78 - 145/65)  BP(mean): --  ABP: --  ABP(mean): --  RR: 20 (30 Oct 2022 13:15) (20 - 20)  SpO2: 97% (30 Oct 2022 13:15) (97% - 97%)    O2 Parameters below as of 30 Oct 2022 13:15  Patient On (Oxygen Delivery Method): nasal cannula  O2 Flow (L/min): 2          General: Not in distress, cachectic, dry MM   HEENT:  EARLENE              Lymphatic system: No LN  Lungs: Bilateral BS, RLL crackles   Cardiovascular: Regular rate and rhythm, no murmurs no JVD   Gastrointestinal: Soft, Positive BS  Musculoskeletal: No clubbing.  Moves all extremities.    Skin: Warm.  Intact, skin tenting, dry MM   Neurological: No motor or sensory deficit         LABS:                          9.6    7.88  )-----------( 262      ( 30 Oct 2022 13:33 )             30.9                                               10-30    144  |  100  |  66<H>  ----------------------------<  133<H>  4.4   |  36<H>  |  3.96<H>    Ca    13.9<HH>      30 Oct 2022 13:34                                                   CARDIAC MARKERS ( 30 Oct 2022 13:34 )  x     / 0.09 ng/mL / x     / x     / x                                                                                                                                                                                  CXR:    ECHO:    MEDICATIONS  (STANDING):  lactated ringers Bolus 500 milliLiter(s) IV Bolus once  lactated ringers. 1000 milliLiter(s) (150 mL/Hr) IV Continuous <Continuous>    MEDICATIONS  (PRN):  acetaminophen     Tablet .. 650 milliGRAM(s) Oral every 6 hours PRN Temp greater or equal to 38C (100.4F), Mild Pain (1 - 3)  aluminum hydroxide/magnesium hydroxide/simethicone Suspension 30 milliLiter(s) Oral every 4 hours PRN Dyspepsia  melatonin 3 milliGRAM(s) Oral at bedtime PRN Insomnia  ondansetron Injectable 4 milliGRAM(s) IV Push every 8 hours PRN Nausea and/or Vomiting

## 2022-10-30 NOTE — ED ADULT NURSE NOTE - NSFALLRSKHMRSKTYOTFT_ED_ALL_ED
I have reviewed the notes, assessments, and/or procedures performed by Dr. Ramon, I concur with their documentation of García Mercado. Will attempt to obtain [facility] Records to avoid duplicate testing.      /78   Pulse 70   Resp 14   SpO2 98%      Anton Owen, DO    weakness

## 2022-10-30 NOTE — ED PROVIDER NOTE - CRITICAL CARE ATTENDING CONTRIBUTION TO CARE
serial monitoring and reassessment, consultation with other physicians, reviewing old charts and results, documentation, discussion with family regarding prognosis

## 2022-10-30 NOTE — PATIENT PROFILE ADULT - NSPRESCRALCAMT_GEN_A_NUR
Carac Pregnancy And Lactation Text: This medication is Pregnancy Category X and contraindicated in pregnancy and in women who may become pregnant. It is unknown if this medication is excreted in breast milk. Unknown

## 2022-10-30 NOTE — ED ADULT TRIAGE NOTE - OTHER COMPLAINTS
from Nursing facility endorsing right arm swelling and intermittent sob. pt is poor historian. On 2L NC per EMS

## 2022-10-30 NOTE — ED ADULT NURSE NOTE - OBJECTIVE STATEMENT
80 y/o male BIBA from [facility] c/o SOB and right arm swelling. pt on 3 L oxygen in nasal cannula, lower to 2L, maintaining sat 97%.

## 2022-10-31 DIAGNOSIS — N39.0 URINARY TRACT INFECTION, SITE NOT SPECIFIED: ICD-10-CM

## 2022-10-31 LAB
ALBUMIN SERPL ELPH-MCNC: 2.1 G/DL — LOW (ref 3.3–5)
ALBUMIN SERPL ELPH-MCNC: 2.3 G/DL — LOW (ref 3.3–5)
ALP SERPL-CCNC: 76 U/L — SIGNIFICANT CHANGE UP (ref 40–120)
ALT FLD-CCNC: 11 U/L — SIGNIFICANT CHANGE UP (ref 10–45)
ANION GAP SERPL CALC-SCNC: 13 MMOL/L — SIGNIFICANT CHANGE UP (ref 5–17)
ANION GAP SERPL CALC-SCNC: 15 MMOL/L — SIGNIFICANT CHANGE UP (ref 5–17)
AST SERPL-CCNC: 15 U/L — SIGNIFICANT CHANGE UP (ref 10–40)
BASE EXCESS BLDA CALC-SCNC: 6.1 MMOL/L — HIGH (ref -2–3)
BASOPHILS # BLD AUTO: 0.01 K/UL — SIGNIFICANT CHANGE UP (ref 0–0.2)
BASOPHILS NFR BLD AUTO: 0.1 % — SIGNIFICANT CHANGE UP (ref 0–2)
BILIRUB SERPL-MCNC: 0.4 MG/DL — SIGNIFICANT CHANGE UP (ref 0.2–1.2)
BUN SERPL-MCNC: 57 MG/DL — HIGH (ref 7–23)
BUN SERPL-MCNC: 64 MG/DL — HIGH (ref 7–23)
CALCIUM SERPL-MCNC: 12.2 MG/DL — HIGH (ref 8.4–10.5)
CALCIUM SERPL-MCNC: 12.8 MG/DL — HIGH (ref 8.4–10.5)
CALCIUM SERPL-MCNC: 13.4 MG/DL — CRITICAL HIGH (ref 8.4–10.5)
CHLORIDE SERPL-SCNC: 96 MMOL/L — SIGNIFICANT CHANGE UP (ref 96–108)
CHLORIDE SERPL-SCNC: 98 MMOL/L — SIGNIFICANT CHANGE UP (ref 96–108)
CK MB CFR SERPL CALC: 1.2 NG/ML — SIGNIFICANT CHANGE UP (ref 0–6.7)
CK MB CFR SERPL CALC: 1.9 NG/ML — SIGNIFICANT CHANGE UP (ref 0–6.7)
CK MB CFR SERPL CALC: 2.8 NG/ML — SIGNIFICANT CHANGE UP (ref 0–6.7)
CK SERPL-CCNC: 19 U/L — LOW (ref 30–200)
CK SERPL-CCNC: 35 U/L — SIGNIFICANT CHANGE UP (ref 30–200)
CK SERPL-CCNC: 45 U/L — SIGNIFICANT CHANGE UP (ref 30–200)
CO2 BLDA-SCNC: 33 MMOL/L — HIGH (ref 19–24)
CO2 SERPL-SCNC: 27 MMOL/L — SIGNIFICANT CHANGE UP (ref 22–31)
CO2 SERPL-SCNC: 28 MMOL/L — SIGNIFICANT CHANGE UP (ref 22–31)
CREAT SERPL-MCNC: 3.87 MG/DL — HIGH (ref 0.5–1.3)
CREAT SERPL-MCNC: 3.97 MG/DL — HIGH (ref 0.5–1.3)
EGFR: 14 ML/MIN/1.73M2 — LOW
EGFR: 15 ML/MIN/1.73M2 — LOW
EOSINOPHIL # BLD AUTO: 0 K/UL — SIGNIFICANT CHANGE UP (ref 0–0.5)
EOSINOPHIL NFR BLD AUTO: 0 % — SIGNIFICANT CHANGE UP (ref 0–6)
GLUCOSE BLDC GLUCOMTR-MCNC: 91 MG/DL — SIGNIFICANT CHANGE UP (ref 70–99)
GLUCOSE BLDC GLUCOMTR-MCNC: 93 MG/DL — SIGNIFICANT CHANGE UP (ref 70–99)
GLUCOSE BLDC GLUCOMTR-MCNC: 97 MG/DL — SIGNIFICANT CHANGE UP (ref 70–99)
GLUCOSE BLDC GLUCOMTR-MCNC: 97 MG/DL — SIGNIFICANT CHANGE UP (ref 70–99)
GLUCOSE SERPL-MCNC: 127 MG/DL — HIGH (ref 70–99)
GLUCOSE SERPL-MCNC: 97 MG/DL — SIGNIFICANT CHANGE UP (ref 70–99)
HCO3 BLDA-SCNC: 32 MMOL/L — HIGH (ref 21–28)
HCT VFR BLD CALC: 29.6 % — LOW (ref 39–50)
HGB BLD-MCNC: 9 G/DL — LOW (ref 13–17)
IMM GRANULOCYTES NFR BLD AUTO: 1.5 % — HIGH (ref 0–0.9)
LYMPHOCYTES # BLD AUTO: 0.62 K/UL — LOW (ref 1–3.3)
LYMPHOCYTES # BLD AUTO: 7.1 % — LOW (ref 13–44)
MAGNESIUM SERPL-MCNC: 3 MG/DL — HIGH (ref 1.6–2.6)
MCHC RBC-ENTMCNC: 26.9 PG — LOW (ref 27–34)
MCHC RBC-ENTMCNC: 30.4 GM/DL — LOW (ref 32–36)
MCV RBC AUTO: 88.4 FL — SIGNIFICANT CHANGE UP (ref 80–100)
MONOCYTES # BLD AUTO: 0.98 K/UL — HIGH (ref 0–0.9)
MONOCYTES NFR BLD AUTO: 11.3 % — SIGNIFICANT CHANGE UP (ref 2–14)
NEUTROPHILS # BLD AUTO: 6.94 K/UL — SIGNIFICANT CHANGE UP (ref 1.8–7.4)
NEUTROPHILS NFR BLD AUTO: 80 % — HIGH (ref 43–77)
NRBC # BLD: 0 /100 WBCS — SIGNIFICANT CHANGE UP (ref 0–0)
PCO2 BLDA: 49 MMHG — HIGH (ref 35–48)
PH BLDA: 7.42 — SIGNIFICANT CHANGE UP (ref 7.35–7.45)
PHOSPHATE SERPL-MCNC: 3.9 MG/DL — SIGNIFICANT CHANGE UP (ref 2.5–4.5)
PLATELET # BLD AUTO: 264 K/UL — SIGNIFICANT CHANGE UP (ref 150–400)
PO2 BLDA: 57 MMHG — LOW (ref 83–108)
POTASSIUM SERPL-MCNC: 4.4 MMOL/L — SIGNIFICANT CHANGE UP (ref 3.5–5.3)
POTASSIUM SERPL-MCNC: 4.4 MMOL/L — SIGNIFICANT CHANGE UP (ref 3.5–5.3)
POTASSIUM SERPL-SCNC: 4.4 MMOL/L — SIGNIFICANT CHANGE UP (ref 3.5–5.3)
POTASSIUM SERPL-SCNC: 4.4 MMOL/L — SIGNIFICANT CHANGE UP (ref 3.5–5.3)
PROT SERPL-MCNC: 6.5 G/DL — SIGNIFICANT CHANGE UP (ref 6–8.3)
RBC # BLD: 3.35 M/UL — LOW (ref 4.2–5.8)
RBC # FLD: 15 % — HIGH (ref 10.3–14.5)
SAO2 % BLDA: 91.6 % — LOW (ref 94–98)
SODIUM SERPL-SCNC: 138 MMOL/L — SIGNIFICANT CHANGE UP (ref 135–145)
SODIUM SERPL-SCNC: 139 MMOL/L — SIGNIFICANT CHANGE UP (ref 135–145)
T3 SERPL-MCNC: 53 NG/DL — LOW (ref 80–200)
TROPONIN T SERPL-MCNC: 0.07 NG/ML — CRITICAL HIGH (ref 0–0.01)
TROPONIN T SERPL-MCNC: 0.08 NG/ML — CRITICAL HIGH (ref 0–0.01)
TSH SERPL-MCNC: 2.33 UIU/ML — SIGNIFICANT CHANGE UP (ref 0.27–4.2)
WBC # BLD: 8.68 K/UL — SIGNIFICANT CHANGE UP (ref 3.8–10.5)
WBC # FLD AUTO: 8.68 K/UL — SIGNIFICANT CHANGE UP (ref 3.8–10.5)

## 2022-10-31 PROCEDURE — 99233 SBSQ HOSP IP/OBS HIGH 50: CPT | Mod: GC

## 2022-10-31 RX ORDER — SODIUM CHLORIDE 9 MG/ML
1000 INJECTION INTRAMUSCULAR; INTRAVENOUS; SUBCUTANEOUS
Refills: 0 | Status: DISCONTINUED | OUTPATIENT
Start: 2022-10-31 | End: 2022-11-02

## 2022-10-31 RX ORDER — ATORVASTATIN CALCIUM 80 MG/1
20 TABLET, FILM COATED ORAL AT BEDTIME
Refills: 0 | Status: DISCONTINUED | OUTPATIENT
Start: 2022-10-31 | End: 2022-11-02

## 2022-10-31 RX ORDER — CALCITONIN SALMON 200 [IU]/ML
160 INJECTION, SOLUTION INTRAMUSCULAR EVERY 12 HOURS
Refills: 0 | Status: COMPLETED | OUTPATIENT
Start: 2022-10-31 | End: 2022-11-01

## 2022-10-31 RX ORDER — SODIUM CHLORIDE 9 MG/ML
1000 INJECTION, SOLUTION INTRAVENOUS
Refills: 0 | Status: DISCONTINUED | OUTPATIENT
Start: 2022-10-31 | End: 2022-10-31

## 2022-10-31 RX ORDER — AMLODIPINE BESYLATE 2.5 MG/1
10 TABLET ORAL EVERY 24 HOURS
Refills: 0 | Status: DISCONTINUED | OUTPATIENT
Start: 2022-10-31 | End: 2022-11-04

## 2022-10-31 RX ORDER — HEPARIN SODIUM 5000 [USP'U]/ML
5000 INJECTION INTRAVENOUS; SUBCUTANEOUS EVERY 12 HOURS
Refills: 0 | Status: DISCONTINUED | OUTPATIENT
Start: 2022-10-31 | End: 2022-11-02

## 2022-10-31 RX ORDER — ENOXAPARIN SODIUM 100 MG/ML
30 INJECTION SUBCUTANEOUS EVERY 24 HOURS
Refills: 0 | Status: DISCONTINUED | OUTPATIENT
Start: 2022-10-31 | End: 2022-10-31

## 2022-10-31 RX ORDER — ISOSORBIDE MONONITRATE 60 MG/1
30 TABLET, EXTENDED RELEASE ORAL EVERY 24 HOURS
Refills: 0 | Status: DISCONTINUED | OUTPATIENT
Start: 2022-10-31 | End: 2022-11-04

## 2022-10-31 RX ADMIN — Medication 3 MILLILITER(S): at 17:44

## 2022-10-31 RX ADMIN — AMLODIPINE BESYLATE 10 MILLIGRAM(S): 2.5 TABLET ORAL at 07:12

## 2022-10-31 RX ADMIN — ATORVASTATIN CALCIUM 20 MILLIGRAM(S): 80 TABLET, FILM COATED ORAL at 22:19

## 2022-10-31 RX ADMIN — ISOSORBIDE MONONITRATE 30 MILLIGRAM(S): 60 TABLET, EXTENDED RELEASE ORAL at 07:12

## 2022-10-31 RX ADMIN — SODIUM CHLORIDE 150 MILLILITER(S): 9 INJECTION, SOLUTION INTRAVENOUS at 02:57

## 2022-10-31 RX ADMIN — AZITHROMYCIN 255 MILLIGRAM(S): 500 TABLET, FILM COATED ORAL at 18:29

## 2022-10-31 RX ADMIN — SODIUM CHLORIDE 150 MILLILITER(S): 9 INJECTION INTRAMUSCULAR; INTRAVENOUS; SUBCUTANEOUS at 17:43

## 2022-10-31 RX ADMIN — Medication 3 MILLILITER(S): at 04:00

## 2022-10-31 RX ADMIN — SODIUM CHLORIDE 90 MILLILITER(S): 9 INJECTION, SOLUTION INTRAVENOUS at 12:23

## 2022-10-31 RX ADMIN — HEPARIN SODIUM 5000 UNIT(S): 5000 INJECTION INTRAVENOUS; SUBCUTANEOUS at 07:12

## 2022-10-31 RX ADMIN — Medication 3 MILLILITER(S): at 09:50

## 2022-10-31 RX ADMIN — CALCITONIN SALMON 160 INTERNATIONAL UNIT(S): 200 INJECTION, SOLUTION INTRAMUSCULAR at 20:36

## 2022-10-31 RX ADMIN — CEFTRIAXONE 100 MILLIGRAM(S): 500 INJECTION, POWDER, FOR SOLUTION INTRAMUSCULAR; INTRAVENOUS at 17:44

## 2022-10-31 RX ADMIN — HEPARIN SODIUM 5000 UNIT(S): 5000 INJECTION INTRAVENOUS; SUBCUTANEOUS at 17:44

## 2022-10-31 RX ADMIN — Medication 3 MILLILITER(S): at 22:18

## 2022-10-31 RX ADMIN — CALCITONIN SALMON 160 INTERNATIONAL UNIT(S): 200 INJECTION, SOLUTION INTRAMUSCULAR at 07:12

## 2022-10-31 NOTE — PROGRESS NOTE ADULT - CONVERSATION DETAILS
GOC discussed with patient's son Simone Smith on 10/31. 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 Options to pursue further treatment were discussed, including CT imaging and oncology consultation. It is the family's wishes that given the patient's current condition, further evaluation of suspected malignancy not be pursued. Pending palliative care evaluation.

## 2022-10-31 NOTE — DIETITIAN INITIAL EVALUATION ADULT - OTHER INFO
80yo, Cantonese/English speaking, former smoker male PMHx HTN, asthma vs COPD (no hospitalizations/intubations) HLD, DM2, HFpEF, presents with fatigue and weakness for two weeks. Per son, pt is less conversational and has reported some SOB. Recent admission to [facility] for mechanical fall, admitted to cardiac telemetry for diastolic heart failure and IV lasix was given, TTE revealed normal EF. Patient was discharged to [facility] [place] and has been at rehab for two weeks. At rehab, son reports patient with poor appetite / PO intake for two weeks, progressively gotten weaker.     Pt is 7LA. On room air. IV LR runs for hydration. Pt is currently NPO. Noted NKFA. CBW 90lbs.  82yo, Cantonese/English speaking, former smoker male PMHx HTN, asthma vs COPD (no hospitalizations/intubations) HLD, DM2, HFpEF, presents with fatigue and weakness for two weeks. Per son, pt is less conversational and has reported some SOB. Recent admission to [facility] for mechanical fall, admitted to cardiac telemetry for diastolic heart failure and IV lasix was given, TTE revealed normal EF. Patient was discharged to [facility] and has been at rehab for two weeks. At rehab, son reports patient with poor appetite / PO intake for two weeks, progressively gotten weaker.     Pt is 7LA. On room air. IV LR runs for hydration. Pt is currently NPO. Noted NKFA. CBW 90lbs.   82yo, Cantonese/English speaking, former smoker male PMHx HTN, asthma vs COPD (no hospitalizations/intubations) HLD, DM2, HFpEF, presents with fatigue and weakness for two weeks. Per son, pt is less conversational and has reported some SOB. Recent admission to [facility] for mechanical fall, admitted to cardiac telemetry for diastolic heart failure and IV lasix was given, TTE revealed normal EF. Patient was discharged to [facility] and has been at rehab for two weeks. At rehab, son reports patient with poor appetite / PO intake for two weeks, progressively gotten weaker.     Pt is 7LA. On room air. IV LR runs for hydration. Family members present at bedside. Pt is currently NPO. Family reports NKFA. CBW 90lbs; family states that pt has the same weight in the past 2 weeks. Reported decreased PO intake <50% in 2 weeks. Observed pt with severe fat, muscle depletion to shoulder, clavicles and orbital. Per ASPEN guidelines pt meets criteria for severe malnutrition. RD educate son on the importance of protein and ONS for wound healing and strength. Son verbalize understanding. Skin: stage 1 pressure injury to sacrum. Dharmesh 13. No edema. Education defer in the setting of AMS.

## 2022-10-31 NOTE — DIETITIAN INITIAL EVALUATION ADULT - PERTINENT LABORATORY DATA
10-30    143  |  100  |  63<H>  ----------------------------<  160<H>  4.5   |  35<H>  |  3.89<H>    Ca    13.4<HH>      31 Oct 2022 03:27  Phos  2.5     10-30  Mg     2.0     10-30    TPro  x   /  Alb  2.3<L>  /  TBili  x   /  DBili  x   /  AST  x   /  ALT  x   /  AlkPhos  x   10-31  POCT Blood Glucose.: 97 mg/dL (10-31-22 @ 06:30)  A1C with Estimated Average Glucose Result: 5.8 % (10-10-22 @ 05:30)

## 2022-10-31 NOTE — PROGRESS NOTE ADULT - PROBLEM SELECTOR PLAN 5
ENZO on CKD (baseline ~Cr 2.09), on admission CR 3.89, FeUrea 52.1% (intrinsic) FeNa: 0.9% (pre-renal)  I/s/o dehydration 2/2 poor PO intake  - IVF as above  - monitor strict I/Os

## 2022-10-31 NOTE — PROGRESS NOTE ADULT - SUBJECTIVE AND OBJECTIVE BOX
INTERVAL HPI/OVERNIGHT EVENTS:  O/N: Patient afebrile overnight with stable vitals. Tolerated RA.   This morning: Patient was seen and examined at bedside. In AM patient desaturating to mid 80s. Not responsive to NC. O2 sats improved on NRB. Transitioned to HFNC. UOP poor.     VITAL SIGNS:  T(F): 97 (10-31-22 @ 15:07)  HR: 78 (10-31-22 @ 14:58)  BP: 120/57 (10-31-22 @ 12:56)  RR: 20 (10-31-22 @ 14:58)  SpO2: 96% (10-31-22 @ 14:58)  Wt(kg): --    PHYSICAL EXAM:    General: Disoriented   HEENT: NC/AT; PERRLA, mucus membranes dry  Neck: supple, trachea midline  Cardiovascular: Distant heart sounds, +S1/S2; NO M/R/G. R sided firm, fixed supraclavicular lymphadenopathy.   Respiratory: Diminished breath sounds bilaterally. L more diminished than R. Occasional wheeze. No accessory muscle use  Gastrointestinal: soft, NT/ND; +BSx4  Extremities: RUE with pitting edema to shoulder. No palpable cord. No asymmetric erythema/warmth. No LUE swelling/erythema/warmth. B/L LE w/o edema or tenderness to palpation   Vascular: Extremities cool. Palpable pulses in bilateral UE/LE  Neurological: AAOx1 (name); no focal deficits    MEDICATIONS  (STANDING):  albuterol/ipratropium for Nebulization 3 milliLiter(s) Nebulizer every 6 hours  amLODIPine   Tablet 10 milliGRAM(s) Oral every 24 hours  atorvastatin 20 milliGRAM(s) Oral at bedtime  azithromycin  IVPB 500 milliGRAM(s) IV Intermittent every 24 hours  cefTRIAXone   IVPB 1000 milliGRAM(s) IV Intermittent every 24 hours  dextrose 5%. 1000 milliLiter(s) (100 mL/Hr) IV Continuous <Continuous>  dextrose 5%. 1000 milliLiter(s) (50 mL/Hr) IV Continuous <Continuous>  dextrose 50% Injectable 25 Gram(s) IV Push once  dextrose 50% Injectable 12.5 Gram(s) IV Push once  dextrose 50% Injectable 25 Gram(s) IV Push once  glucagon  Injectable 1 milliGRAM(s) IntraMuscular once  heparin   Injectable 5000 Unit(s) SubCutaneous every 12 hours  insulin lispro (ADMELOG) corrective regimen sliding scale   SubCutaneous every 6 hours  isosorbide   mononitrate ER Tablet (IMDUR) 30 milliGRAM(s) Oral every 24 hours  lactated ringers. 1000 milliLiter(s) (90 mL/Hr) IV Continuous <Continuous>  lactated ringers. 1000 milliLiter(s) (150 mL/Hr) IV Continuous <Continuous>    MEDICATIONS  (PRN):  acetaminophen     Tablet .. 650 milliGRAM(s) Oral every 6 hours PRN Temp greater or equal to 38C (100.4F), Mild Pain (1 - 3)  dextrose Oral Gel 15 Gram(s) Oral once PRN Blood Glucose LESS THAN 70 milliGRAM(s)/deciliter      Allergies    No Known Allergies    Intolerances        LABS:                        9.0    8.68  )-----------( 264      ( 31 Oct 2022 11:12 )             29.6     10-31    139  |  96  |  57<H>  ----------------------------<  97  4.4   |  28  |  3.97<H>    Ca    12.8<H>      31 Oct 2022 11:12  Phos  3.9     10-31  Mg     3.0     10-31    TPro  6.5  /  Alb  2.1<L>  /  TBili  0.4  /  DBili  x   /  AST  15  /  ALT  11  /  AlkPhos  76  10-31      Urinalysis Basic - ( 30 Oct 2022 18:21 )    Color: Yellow / Appearance: Clear / S.020 / pH: x  Gluc: x / Ketone: NEGATIVE  / Bili: Negative / Urobili: 0.2 E.U./dL   Blood: x / Protein: NEGATIVE mg/dL / Nitrite: POSITIVE   Leuk Esterase: Small / RBC: < 5 /HPF / WBC > 10 /HPF   Sq Epi: x / Non Sq Epi: 0-5 /HPF / Bacteria: Many /HPF            Culture - Blood (collected 10-31-22 @ 01:23)  Source: .Blood Blood  Preliminary Report (10-31-22 @ 15:00):    No growth at 12 hours    Culture - Blood (collected 10-31-22 @ 01:23)  Source: .Blood Blood  Preliminary Report (10-31-22 @ 15:00):    No growth at 12 hours        RADIOLOGY & ADDITIONAL TESTS:  Reviewed

## 2022-10-31 NOTE — PROGRESS NOTE ADULT - ASSESSMENT
80yo, Cantonese/English speaking, former smoker male PMHx HTN, asthma vs COPD (?) (no hospitalizations/intubations) HLD, DM2, HFpEF, presents with fatigue and weakness for two weeks. Found to have symptomatic hypercalcemia, UTI, and CAP, admitted for further management.

## 2022-10-31 NOTE — DIETITIAN INITIAL EVALUATION ADULT - OTHER CALCULATIONS
ActualBW used for calculations as pt between %IBW is 80%. Calorie needs adjusted for COPD with increase protein for stage 1 pressure injury. Defer fluid to team in the light of CHF vs COPD.

## 2022-10-31 NOTE — DIETITIAN INITIAL EVALUATION ADULT - ADD RECOMMEND
1. NPO. Advanced diet to regular when medically feasible. Honor food preferences as able. 1. NPO. Advanced diet to regular when medically feasible. Honor food preferences as able. Recommend SLP evaluation prior to PO intake to assess for safest, least restrictive texture modification. Consult RD for Enteral recs. Align nutrition with GOC at all times. 2. Once PO advanced, please add EnsureMax BID (150 linda, 30g pro/ serving) to promote PO 3. Please add MVI for overall health 4. Monitor labs BG, a, BUN, creat) pain, GI distress.

## 2022-10-31 NOTE — DIETITIAN INITIAL EVALUATION ADULT - ORAL INTAKE PTA/DIET HISTORY
Additional Progress Note... Family reported decreased PO intake since being in rehab 2 weeks ago. previously follows pureed diet. Pt takes supplements PTA, mixed with miralax to promote GI. Family member unaware of the name.

## 2022-10-31 NOTE — DIETITIAN INITIAL EVALUATION ADULT - PERTINENT MEDS FT
MEDICATIONS  (STANDING):  albuterol/ipratropium for Nebulization 3 milliLiter(s) Nebulizer every 6 hours  amLODIPine   Tablet 10 milliGRAM(s) Oral every 24 hours  atorvastatin 20 milliGRAM(s) Oral at bedtime  azithromycin  IVPB 500 milliGRAM(s) IV Intermittent every 24 hours  cefTRIAXone   IVPB 1000 milliGRAM(s) IV Intermittent every 24 hours  dextrose 5%. 1000 milliLiter(s) (100 mL/Hr) IV Continuous <Continuous>  dextrose 5%. 1000 milliLiter(s) (50 mL/Hr) IV Continuous <Continuous>  dextrose 50% Injectable 25 Gram(s) IV Push once  dextrose 50% Injectable 12.5 Gram(s) IV Push once  dextrose 50% Injectable 25 Gram(s) IV Push once  glucagon  Injectable 1 milliGRAM(s) IntraMuscular once  heparin   Injectable 5000 Unit(s) SubCutaneous every 12 hours  insulin lispro (ADMELOG) corrective regimen sliding scale   SubCutaneous every 6 hours  isosorbide   mononitrate ER Tablet (IMDUR) 30 milliGRAM(s) Oral every 24 hours  lactated ringers. 1000 milliLiter(s) (150 mL/Hr) IV Continuous <Continuous>    MEDICATIONS  (PRN):  acetaminophen     Tablet .. 650 milliGRAM(s) Oral every 6 hours PRN Temp greater or equal to 38C (100.4F), Mild Pain (1 - 3)  dextrose Oral Gel 15 Gram(s) Oral once PRN Blood Glucose LESS THAN 70 milliGRAM(s)/deciliter

## 2022-10-31 NOTE — DIETITIAN INITIAL EVALUATION ADULT - NSFNSPHYEXAMSKINFT_GEN_A_CORE
Pressure Injury 1: sacrum, Stage I  Pressure Injury 2: none, none  Pressure Injury 3: none, none  Pressure Injury 4: none, none  Pressure Injury 5: none, none  Pressure Injury 6: none, none  Pressure Injury 7: none, none  Pressure Injury 8: none, none  Pressure Injury 9: none, none  Pressure Injury 10: none, none  Pressure Injury 11: none, none

## 2022-10-31 NOTE — PROGRESS NOTE ADULT - PROBLEM SELECTOR PLAN 7
- on amlodipine 10mg at home, continue as BP tolerates  - on imdur 30mg extended release, continue as BP tolerates

## 2022-10-31 NOTE — DIETITIAN NUTRITION RISK NOTIFICATION - TREATMENT: THE FOLLOWING DIET HAS BEEN RECOMMENDED
1. NPO. Advanced diet to regular when medically feasible.   Honor food preferences as able.   Recommend SLP evaluation prior to PO intake to assess for safest, least restrictive texture modification.   Consult RD for Enteral recs. Align nutrition with GOC at all times.   2. Once PO advanced, please add EnsureMax BID (150 linda, 30g pro/ serving) to promote PO   3. Please add MVI for overall health   4. Monitor labs BG, a, BUN, creat) pain, GI distress.

## 2022-10-31 NOTE — PROGRESS NOTE ADULT - PROBLEM SELECTOR PLAN 4
RUE with pitting edema to shoulder, possible DVT. Wells 4.5 (+3 for likely DVT RUE, +1.5 for HR>100). Ddimer 511  - f/u RUE Doppler

## 2022-10-31 NOTE — PROGRESS NOTE ADULT - PROBLEM SELECTOR PLAN 6
#Asthma/COPD  - on 2L O2, saturating 97%   - patient's son reports possible copd diagnosis, former smoker   - documentation reports asthma hx  - no wheezing on exam   - continue with duoneb prn, on proair prn at home   - wean off O2

## 2022-10-31 NOTE — PROGRESS NOTE ADULT - PROBLEM SELECTOR PLAN 1
DDx for hypercalcemia includes severe dehydration, immobility, multiple myeloma, hypercalcemia 2/2 undiagnosed malignancy, hypothyroidism. S/p 1L bolus of LR. Patient with significant hypovolemia on exam  Corrected Calcium 16.1-->15.8. Total protein 3.3 (L), Protein gap 2.1 (albumin 1.2). TSH 2.36 (wnl)   UA showed few calcium oxalate crystals.   - LR @90cc/hr; gentle fluid resuscitation i/s/o oliguric ENZO  - s/p calcitonin 160u q12h, for 2 doses; improving  - Oliguric  - Defer bisphosphonate i/s/o depressed CrCl  - Given new RUE edema, supraclavicular lymphadenopathy and hypercalcemia, suspect patient may have primary lung malignancy  - f/u: PTH, PTHrP, ionized calcium, albumin, SPEP, UPEP, Vit D

## 2022-11-01 DIAGNOSIS — R06.02 SHORTNESS OF BREATH: ICD-10-CM

## 2022-11-01 DIAGNOSIS — Z71.89 OTHER SPECIFIED COUNSELING: ICD-10-CM

## 2022-11-01 DIAGNOSIS — C34.90 MALIGNANT NEOPLASM OF UNSPECIFIED PART OF UNSPECIFIED BRONCHUS OR LUNG: ICD-10-CM

## 2022-11-01 DIAGNOSIS — J96.01 ACUTE RESPIRATORY FAILURE WITH HYPOXIA: ICD-10-CM

## 2022-11-01 DIAGNOSIS — Z51.5 ENCOUNTER FOR PALLIATIVE CARE: ICD-10-CM

## 2022-11-01 DIAGNOSIS — R53.81 OTHER MALAISE: ICD-10-CM

## 2022-11-01 LAB
24R-OH-CALCIDIOL SERPL-MCNC: 40.7 NG/ML — SIGNIFICANT CHANGE UP (ref 30–80)
ALBUMIN SERPL ELPH-MCNC: 2.2 G/DL — LOW (ref 3.3–5)
ANION GAP SERPL CALC-SCNC: 12 MMOL/L — SIGNIFICANT CHANGE UP (ref 5–17)
BLD GP AB SCN SERPL QL: NEGATIVE — SIGNIFICANT CHANGE UP
BUN SERPL-MCNC: 63 MG/DL — HIGH (ref 7–23)
CALCIUM SERPL-MCNC: 11.8 MG/DL — HIGH (ref 8.4–10.5)
CALCIUM SERPL-MCNC: 12.2 MG/DL — HIGH (ref 8.4–10.5)
CHLORIDE SERPL-SCNC: 100 MMOL/L — SIGNIFICANT CHANGE UP (ref 96–108)
CO2 SERPL-SCNC: 28 MMOL/L — SIGNIFICANT CHANGE UP (ref 22–31)
CREAT SERPL-MCNC: 3.93 MG/DL — HIGH (ref 0.5–1.3)
EGFR: 14 ML/MIN/1.73M2 — LOW
GLUCOSE BLDC GLUCOMTR-MCNC: 111 MG/DL — HIGH (ref 70–99)
GLUCOSE BLDC GLUCOMTR-MCNC: 74 MG/DL — SIGNIFICANT CHANGE UP (ref 70–99)
GLUCOSE BLDC GLUCOMTR-MCNC: 79 MG/DL — SIGNIFICANT CHANGE UP (ref 70–99)
GLUCOSE BLDC GLUCOMTR-MCNC: 81 MG/DL — SIGNIFICANT CHANGE UP (ref 70–99)
GLUCOSE SERPL-MCNC: 82 MG/DL — SIGNIFICANT CHANGE UP (ref 70–99)
HCT VFR BLD CALC: 29.5 % — LOW (ref 39–50)
HGB BLD-MCNC: 9.4 G/DL — LOW (ref 13–17)
MAGNESIUM SERPL-MCNC: 2.9 MG/DL — HIGH (ref 1.6–2.6)
MCHC RBC-ENTMCNC: 27.3 PG — SIGNIFICANT CHANGE UP (ref 27–34)
MCHC RBC-ENTMCNC: 31.9 GM/DL — LOW (ref 32–36)
MCV RBC AUTO: 85.8 FL — SIGNIFICANT CHANGE UP (ref 80–100)
NRBC # BLD: 0 /100 WBCS — SIGNIFICANT CHANGE UP (ref 0–0)
PHOSPHATE SERPL-MCNC: 3.6 MG/DL — SIGNIFICANT CHANGE UP (ref 2.5–4.5)
PLATELET # BLD AUTO: 265 K/UL — SIGNIFICANT CHANGE UP (ref 150–400)
POTASSIUM SERPL-MCNC: 3.9 MMOL/L — SIGNIFICANT CHANGE UP (ref 3.5–5.3)
POTASSIUM SERPL-SCNC: 3.9 MMOL/L — SIGNIFICANT CHANGE UP (ref 3.5–5.3)
PROT SERPL-MCNC: 5.5 G/DL — LOW (ref 6–8.3)
PROT SERPL-MCNC: 5.5 G/DL — LOW (ref 6–8.3)
PTH-INTACT FLD-MCNC: 9 PG/ML — LOW (ref 15–65)
RBC # BLD: 3.44 M/UL — LOW (ref 4.2–5.8)
RBC # FLD: 15.3 % — HIGH (ref 10.3–14.5)
RH IG SCN BLD-IMP: POSITIVE — SIGNIFICANT CHANGE UP
SODIUM SERPL-SCNC: 140 MMOL/L — SIGNIFICANT CHANGE UP (ref 135–145)
VIT D25+D1,25 OH+D1,25 PNL SERPL-MCNC: 136 PG/ML — HIGH (ref 19.9–79.3)
WBC # BLD: 9.45 K/UL — SIGNIFICANT CHANGE UP (ref 3.8–10.5)
WBC # FLD AUTO: 9.45 K/UL — SIGNIFICANT CHANGE UP (ref 3.8–10.5)

## 2022-11-01 PROCEDURE — 99233 SBSQ HOSP IP/OBS HIGH 50: CPT | Mod: GC

## 2022-11-01 PROCEDURE — 99497 ADVNCD CARE PLAN 30 MIN: CPT | Mod: 25

## 2022-11-01 PROCEDURE — 99358 PROLONG SERVICE W/O CONTACT: CPT

## 2022-11-01 PROCEDURE — 99223 1ST HOSP IP/OBS HIGH 75: CPT

## 2022-11-01 RX ORDER — DEXTROSE 50 % IN WATER 50 %
25 SYRINGE (ML) INTRAVENOUS ONCE
Refills: 0 | Status: COMPLETED | OUTPATIENT
Start: 2022-11-01 | End: 2022-11-01

## 2022-11-01 RX ORDER — QUETIAPINE FUMARATE 200 MG/1
12.5 TABLET, FILM COATED ORAL AT BEDTIME
Refills: 0 | Status: DISCONTINUED | OUTPATIENT
Start: 2022-11-01 | End: 2022-11-04

## 2022-11-01 RX ORDER — LANOLIN ALCOHOL/MO/W.PET/CERES
5 CREAM (GRAM) TOPICAL ONCE
Refills: 0 | Status: COMPLETED | OUTPATIENT
Start: 2022-11-01 | End: 2022-11-01

## 2022-11-01 RX ORDER — FUROSEMIDE 40 MG
40 TABLET ORAL ONCE
Refills: 0 | Status: COMPLETED | OUTPATIENT
Start: 2022-11-01 | End: 2022-11-01

## 2022-11-01 RX ADMIN — HEPARIN SODIUM 5000 UNIT(S): 5000 INJECTION INTRAVENOUS; SUBCUTANEOUS at 06:47

## 2022-11-01 RX ADMIN — Medication 3 MILLILITER(S): at 15:05

## 2022-11-01 RX ADMIN — ATORVASTATIN CALCIUM 20 MILLIGRAM(S): 80 TABLET, FILM COATED ORAL at 22:55

## 2022-11-01 RX ADMIN — ISOSORBIDE MONONITRATE 30 MILLIGRAM(S): 60 TABLET, EXTENDED RELEASE ORAL at 06:47

## 2022-11-01 RX ADMIN — Medication 25 MILLILITER(S): at 18:22

## 2022-11-01 RX ADMIN — Medication 40 MILLIGRAM(S): at 11:03

## 2022-11-01 RX ADMIN — AMLODIPINE BESYLATE 10 MILLIGRAM(S): 2.5 TABLET ORAL at 06:47

## 2022-11-01 RX ADMIN — Medication 3 MILLILITER(S): at 03:29

## 2022-11-01 RX ADMIN — QUETIAPINE FUMARATE 12.5 MILLIGRAM(S): 200 TABLET, FILM COATED ORAL at 22:55

## 2022-11-01 RX ADMIN — CALCITONIN SALMON 160 INTERNATIONAL UNIT(S): 200 INJECTION, SOLUTION INTRAMUSCULAR at 09:43

## 2022-11-01 RX ADMIN — HEPARIN SODIUM 5000 UNIT(S): 5000 INJECTION INTRAVENOUS; SUBCUTANEOUS at 17:40

## 2022-11-01 RX ADMIN — Medication 3 MILLILITER(S): at 22:55

## 2022-11-01 RX ADMIN — Medication 5 MILLIGRAM(S): at 03:29

## 2022-11-01 RX ADMIN — Medication 3 MILLILITER(S): at 09:43

## 2022-11-01 RX ADMIN — SODIUM CHLORIDE 100 MILLILITER(S): 9 INJECTION INTRAMUSCULAR; INTRAVENOUS; SUBCUTANEOUS at 22:56

## 2022-11-01 NOTE — PROGRESS NOTE ADULT - PROBLEM SELECTOR PLAN 5
ENZO on CKD (baseline ~Cr 2.09), on admission CR 3.89, FeUrea 52.1% (intrinsic) FeNa: 0.9% (pre-renal)  I/s/o dehydration 2/2 poor PO intake  - IVF as above  - monitor strict I/Os RUE with pitting edema to shoulder, possible DVT. Wells 4.5 (+3 for likely DVT RUE, +1.5 for HR>100). Ddimer 511  - f/u RUE Doppler

## 2022-11-01 NOTE — PROGRESS NOTE ADULT - PROBLEM SELECTOR PLAN 6
#Asthma/COPD  - on 2L O2, saturating 97%   - patient's son reports possible copd diagnosis, former smoker   - documentation reports asthma hx  - no wheezing on exam   - continue with duoneb prn, on proair prn at home   - wean off O2 ENZO on CKD (baseline ~Cr 2.09), on admission CR 3.89, FeUrea 52.1% (intrinsic) FeNa: 0.9% (pre-renal)  I/s/o dehydration 2/2 poor PO intake  - Oliguric  - lasic 40mg IVP  - monitor strict I/Os

## 2022-11-01 NOTE — CONSULT NOTE ADULT - PROBLEM SELECTOR RECOMMENDATION 3
Patient most likely has hypercalcemia 2/2 undiagnosed malignancy, for which family does not want to pursue work up and wants to concentrate on the patients comfort. Patient with new RUE edema, supraclavicular lymphadenopathy and hypercalcemia, suspect patient may have primary lung malignancy. Continue care as per primary team.

## 2022-11-01 NOTE — CHART NOTE - NSCHARTNOTEFT_GEN_A_CORE
PALLIATIVE MEDICINE COORDINATION OF CARE NOTE FOR LIANNA DAVE  [  ] ED Trigger   [  ] MICU Trigger     [X  ] Consult    [  ] AI Comanagement    Never seen by palliative in the past    __30____ Minutes; Start: ___0730am__  End: __0800am__, of non-face-to-face prolonged service provided that relates to (face-to-face) care that has or will occur and ongoing patient management, including one or more of the following:   - Reviewed records from other physicians or other health care professional services, including one or more of the following: other medical records and diagnostic / radiology study results     HPI:  80yo, Cantonese/English speaking, former smoker male PMHx HTN, asthma vs COPD (?) (no hospitalizations/intubations) HLD, DM2, HFpEF, presents with fatigue and weakness for two weeks. Per son at bedside, who is historian, patient has been feeling out of it, is less conversational and has reported some SOB. Patient was recently admitted a couple weeks ago to [facility] fo mechanical fall, and was admitted to cardiac telemetry for diastolic heart failure and IV lasix was given, TTE revealed normal EF. Patient was discharged to [facility] and has been at rehab for two weeks. At rehab, son reports patient with poor appetite / PO intake for two weeks. He has progressively gotten weaker. Patient's son reports prior to rehab, father lived at home by himself, independent in most ADLs. His other son went over 2 days per week. He reports chronic smoking and believes patient was diagnosed with COPD but is not sure, he uses pro-air occasionally.     ICU consult called for hypercalcemia, ~13.9. Patient has prior hypercalcemia, mild to 11. Not worked up. Son states patient was on hctz, but not on rehab med list. At rehab unclear if he was given diuresis but were concerned about hypocalcemia. Patient is currently AAOX2 (name and place), denies focal symptoms. Drowsy appearing. Denies chest pain, palpitations, dizziness, vomiting, abd pain, N/D, dysuria, HA.     ED vitals: T afebrile  HR 58  RR  /78 SpO2 97 on 2L   Labs significant ~Ca 13, BUN 66, Cr 3.96 (baseline ~2)   Imaging/EKG: EKG: sinus bradycardia, CXR: RLL opacity   Interventions: LR 500cc bolus   Consults: ICU   (30 Oct 2022 18:51)      - Other: iStop reviewed.    Rx found on iStop review. Ref #: [id number]    - Other: Medication reviewed.    The patient HAS NOT used PRN's in the last 24h.    MEDICATIONS  (STANDING):  albuterol/ipratropium for Nebulization 3 milliLiter(s) Nebulizer every 6 hours  amLODIPine   Tablet 10 milliGRAM(s) Oral every 24 hours  atorvastatin 20 milliGRAM(s) Oral at bedtime  azithromycin  IVPB 500 milliGRAM(s) IV Intermittent every 24 hours  calcitonin Injectable 160 International Unit(s) IntraMuscular every 12 hours  cefTRIAXone   IVPB 1000 milliGRAM(s) IV Intermittent every 24 hours  dextrose 5%. 1000 milliLiter(s) (100 mL/Hr) IV Continuous <Continuous>  dextrose 5%. 1000 milliLiter(s) (50 mL/Hr) IV Continuous <Continuous>  dextrose 50% Injectable 25 Gram(s) IV Push once  dextrose 50% Injectable 12.5 Gram(s) IV Push once  dextrose 50% Injectable 25 Gram(s) IV Push once  glucagon  Injectable 1 milliGRAM(s) IntraMuscular once  heparin   Injectable 5000 Unit(s) SubCutaneous every 12 hours  insulin lispro (ADMELOG) corrective regimen sliding scale   SubCutaneous every 6 hours  isosorbide   mononitrate ER Tablet (IMDUR) 30 milliGRAM(s) Oral every 24 hours  sodium chloride 0.9%. 1000 milliLiter(s) (150 mL/Hr) IV Continuous <Continuous>    MEDICATIONS  (PRN):  acetaminophen     Tablet .. 650 milliGRAM(s) Oral every 6 hours PRN Temp greater or equal to 38C (100.4F), Mild Pain (1 - 3)  dextrose Oral Gel 15 Gram(s) Oral once PRN Blood Glucose LESS THAN 70 milliGRAM(s)/deciliter      - Other: Advanced directives     DNR DNI    MOLST form found on patient window under adnvace care planning on 10/15/2022, stating full code      No documented HCP form found on patient window     No Living will / POA / Advance directives found on [place] / patient window     No documented GOC notes on [facility]    - Other: Coordination/Plan of care     __2__ admissions in 1 year     Current admission LOS: _2__ days     LACE score: _10___ ADVANCE ILLNESS PATIENT.     Patient NOT previously seen by palliative medicine consult service.        Consult request for: "  likely mass in setting hypercalcemia, cachectia, one sided extremity swelling  family would prefer to go pallaitive route  "    Full consult to follow within 24h.

## 2022-11-01 NOTE — PROGRESS NOTE ADULT - SUBJECTIVE AND OBJECTIVE BOX
INTERVAL HPI/OVERNIGHT EVENTS:  O/N: Patient agitated overnight. Likely .   This morning: Patient was seen and examined at bedside.      VITAL SIGNS:  T(F): 97.8 (22 @ 15:10)  HR: 73 (22 @ 13:33)  BP: 127/60 (22 @ 12:22)  RR: 20 (22 @ 13:33)  SpO2: 98% (22 @ 13:33)  Wt(kg): --    PHYSICAL EXAM:    Constitutional: NAD  HEENT: PERRL, EOMI, sclera non-icteric, neck supple, trachea midline, no masses, no JVD, MMM, good dentition  Respiratory: CTA b/l, good air entry b/l, no wheezing, no rhonchi, no rales, without accessory muscle use and no intercostal retractions  Cardiovascular: RRR, normal S1S2, no M/R/G  Gastrointestinal: abdomen soft, NTND, no masses palpable, BS normal  Extremities: Warm, well perfused, pulses equal bilateral upper and lower extremities, no edema, no clubbing  Neurological: AAOx3, CN Grossly intact  Skin: Normal temperature, warm, dry    MEDICATIONS  (STANDING):  albuterol/ipratropium for Nebulization 3 milliLiter(s) Nebulizer every 6 hours  amLODIPine   Tablet 10 milliGRAM(s) Oral every 24 hours  atorvastatin 20 milliGRAM(s) Oral at bedtime  cefTRIAXone   IVPB 1000 milliGRAM(s) IV Intermittent every 24 hours  dextrose 5%. 1000 milliLiter(s) (100 mL/Hr) IV Continuous <Continuous>  dextrose 5%. 1000 milliLiter(s) (50 mL/Hr) IV Continuous <Continuous>  dextrose 50% Injectable 25 Gram(s) IV Push once  dextrose 50% Injectable 12.5 Gram(s) IV Push once  dextrose 50% Injectable 25 Gram(s) IV Push once  glucagon  Injectable 1 milliGRAM(s) IntraMuscular once  heparin   Injectable 5000 Unit(s) SubCutaneous every 12 hours  insulin lispro (ADMELOG) corrective regimen sliding scale   SubCutaneous every 6 hours  isosorbide   mononitrate ER Tablet (IMDUR) 30 milliGRAM(s) Oral every 24 hours  QUEtiapine 12.5 milliGRAM(s) Oral at bedtime  sodium chloride 0.9%. 1000 milliLiter(s) (150 mL/Hr) IV Continuous <Continuous>    MEDICATIONS  (PRN):  acetaminophen     Tablet .. 650 milliGRAM(s) Oral every 6 hours PRN Temp greater or equal to 38C (100.4F), Mild Pain (1 - 3)  dextrose Oral Gel 15 Gram(s) Oral once PRN Blood Glucose LESS THAN 70 milliGRAM(s)/deciliter      Allergies    No Known Allergies    Intolerances        LABS:                        9.4    9.45  )-----------( 265      ( 2022 07:42 )             29.5     11-    140  |  100  |  63<H>  ----------------------------<  82  3.9   |  28  |  3.93<H>    Ca    11.8<H>      2022 07:42  Phos  3.6     11  Mg     2.9         TPro  x   /  Alb  2.2<L>  /  TBili  x   /  DBili  x   /  AST  x   /  ALT  x   /  AlkPhos  x         Urinalysis Basic - ( 30 Oct 2022 18:21 )    Color: Yellow / Appearance: Clear / S.020 / pH: x  Gluc: x / Ketone: NEGATIVE  / Bili: Negative / Urobili: 0.2 E.U./dL   Blood: x / Protein: NEGATIVE mg/dL / Nitrite: POSITIVE   Leuk Esterase: Small / RBC: < 5 /HPF / WBC > 10 /HPF   Sq Epi: x / Non Sq Epi: 0-5 /HPF / Bacteria: Many /HPF              RADIOLOGY & ADDITIONAL TESTS:  Reviewed INTERVAL HPI/OVERNIGHT EVENTS:  O/N: Patient agitated overnight. Likely sundowning. All other vitals stable   This morning: Patient was seen and examined at bedside. Mental status unchanged. Tolerating HFNC well.      VITAL SIGNS:  T(F): 97.8 (22 @ 15:10)  HR: 73 (22 @ 13:33)  BP: 127/60 (22 @ 12:22)  RR: 20 (22 @ 13:33)  SpO2: 98% (22 @ 13:33)  Wt(kg): --    PHYSICAL EXAM:    General: Disoriented   HEENT: NC/AT; PERRLA, mucus membranes dry  Neck: supple, trachea midline  Cardiovascular: Distant heart sounds, +S1/S2; NO M/R/G. R sided firm, fixed supraclavicular lymphadenopathy.   Respiratory: Diminished breath sounds bilaterally. L more diminished than R. No accessory muscle use  Gastrointestinal: soft, NT/ND; +BSx4  Extremities: RUE with pitting edema to shoulder. No palpable cord. No asymmetric erythema/warmth. No LUE swelling/erythema/warmth. B/L LE w/o edema or tenderness to palpation   Vascular: Extremities cool. Palpable pulses in bilateral UE/LE  Neurological: AAOx1 (name); no focal deficits    MEDICATIONS  (STANDING):  albuterol/ipratropium for Nebulization 3 milliLiter(s) Nebulizer every 6 hours  amLODIPine   Tablet 10 milliGRAM(s) Oral every 24 hours  atorvastatin 20 milliGRAM(s) Oral at bedtime  cefTRIAXone   IVPB 1000 milliGRAM(s) IV Intermittent every 24 hours  dextrose 5%. 1000 milliLiter(s) (100 mL/Hr) IV Continuous <Continuous>  dextrose 5%. 1000 milliLiter(s) (50 mL/Hr) IV Continuous <Continuous>  dextrose 50% Injectable 25 Gram(s) IV Push once  dextrose 50% Injectable 12.5 Gram(s) IV Push once  dextrose 50% Injectable 25 Gram(s) IV Push once  glucagon  Injectable 1 milliGRAM(s) IntraMuscular once  heparin   Injectable 5000 Unit(s) SubCutaneous every 12 hours  insulin lispro (ADMELOG) corrective regimen sliding scale   SubCutaneous every 6 hours  isosorbide   mononitrate ER Tablet (IMDUR) 30 milliGRAM(s) Oral every 24 hours  QUEtiapine 12.5 milliGRAM(s) Oral at bedtime  sodium chloride 0.9%. 1000 milliLiter(s) (150 mL/Hr) IV Continuous <Continuous>    MEDICATIONS  (PRN):  acetaminophen     Tablet .. 650 milliGRAM(s) Oral every 6 hours PRN Temp greater or equal to 38C (100.4F), Mild Pain (1 - 3)  dextrose Oral Gel 15 Gram(s) Oral once PRN Blood Glucose LESS THAN 70 milliGRAM(s)/deciliter      Allergies    No Known Allergies    Intolerances        LABS:                        9.4    9.45  )-----------( 265      ( 2022 07:42 )             29.5     11-    140  |  100  |  63<H>  ----------------------------<  82  3.9   |  28  |  3.93<H>    Ca    11.8<H>      2022 07:42  Phos  3.6       Mg     2.9         TPro  x   /  Alb  2.2<L>  /  TBili  x   /  DBili  x   /  AST  x   /  ALT  x   /  AlkPhos  x         Urinalysis Basic - ( 30 Oct 2022 18:21 )    Color: Yellow / Appearance: Clear / S.020 / pH: x  Gluc: x / Ketone: NEGATIVE  / Bili: Negative / Urobili: 0.2 E.U./dL   Blood: x / Protein: NEGATIVE mg/dL / Nitrite: POSITIVE   Leuk Esterase: Small / RBC: < 5 /HPF / WBC > 10 /HPF   Sq Epi: x / Non Sq Epi: 0-5 /HPF / Bacteria: Many /HPF              RADIOLOGY & ADDITIONAL TESTS:  Reviewed HOSPITAL COURSE:  82yo Cantonese/English speaking male, former smoker noncompliant with PMHx of HTN, HLD, DM2, COPD (uses son's O2), CKD stage 3 presents with altered mental status and lethargy found to be in oliguric renal failure w/ hypercalcemia, swollen RUE and firm, fixed R supraclavicular lymphadenopathy. Likely 2/2 underlying malignancy. Family aware and elected to make patient DNR/DNI. Pursuing palliative care. Renal function unchanged, however Ca improving with fluids and no evidence of fluid overload. UOP responded well to lasix on . Patient periodically desaturates requiring NRB or positive pressure ventilation. Otherwise tolerates RA/NC. Suspect 2/2 episodic atelectasis On 1hr CPAP Q6H to prevent atelectasis. Currently puree diet.     INTERVAL HPI/OVERNIGHT EVENTS:  O/N: Patient agitated overnight. Likely . All other vitals stable   This morning: Patient was seen and examined at bedside. Mental status unchanged. Tolerating HFNC well.      VITAL SIGNS:  T(F): 97.8 (22 @ 15:10)  HR: 73 (22 @ 13:33)  BP: 127/60 (22 @ 12:22)  RR: 20 (22 @ 13:33)  SpO2: 98% (22 @ 13:33)  Wt(kg): --    PHYSICAL EXAM:    General: Disoriented   HEENT: NC/AT; PERRLA, mucus membranes dry  Neck: supple, trachea midline  Cardiovascular: Distant heart sounds, +S1/S2; NO M/R/G. R sided firm, fixed supraclavicular lymphadenopathy.   Respiratory: Diminished breath sounds bilaterally. L more diminished than R. No accessory muscle use  Gastrointestinal: soft, NT/ND; +BSx4  Extremities: RUE with pitting edema to shoulder. No palpable cord. No asymmetric erythema/warmth. No LUE swelling/erythema/warmth. B/L LE w/o edema or tenderness to palpation   Vascular: Extremities cool. Palpable pulses in bilateral UE/LE  Neurological: AAOx1 (name); no focal deficits    MEDICATIONS  (STANDING):  albuterol/ipratropium for Nebulization 3 milliLiter(s) Nebulizer every 6 hours  amLODIPine   Tablet 10 milliGRAM(s) Oral every 24 hours  atorvastatin 20 milliGRAM(s) Oral at bedtime  cefTRIAXone   IVPB 1000 milliGRAM(s) IV Intermittent every 24 hours  dextrose 5%. 1000 milliLiter(s) (100 mL/Hr) IV Continuous <Continuous>  dextrose 5%. 1000 milliLiter(s) (50 mL/Hr) IV Continuous <Continuous>  dextrose 50% Injectable 25 Gram(s) IV Push once  dextrose 50% Injectable 12.5 Gram(s) IV Push once  dextrose 50% Injectable 25 Gram(s) IV Push once  glucagon  Injectable 1 milliGRAM(s) IntraMuscular once  heparin   Injectable 5000 Unit(s) SubCutaneous every 12 hours  insulin lispro (ADMELOG) corrective regimen sliding scale   SubCutaneous every 6 hours  isosorbide   mononitrate ER Tablet (IMDUR) 30 milliGRAM(s) Oral every 24 hours  QUEtiapine 12.5 milliGRAM(s) Oral at bedtime  sodium chloride 0.9%. 1000 milliLiter(s) (150 mL/Hr) IV Continuous <Continuous>    MEDICATIONS  (PRN):  acetaminophen     Tablet .. 650 milliGRAM(s) Oral every 6 hours PRN Temp greater or equal to 38C (100.4F), Mild Pain (1 - 3)  dextrose Oral Gel 15 Gram(s) Oral once PRN Blood Glucose LESS THAN 70 milliGRAM(s)/deciliter      Allergies    No Known Allergies    Intolerances        LABS:                        9.4    9.45  )-----------( 265      ( 2022 07:42 )             29.5     11-    140  |  100  |  63<H>  ----------------------------<  82  3.9   |  28  |  3.93<H>    Ca    11.8<H>      2022 07:42  Phos  3.6     11  Mg     2.9         TPro  x   /  Alb  2.2<L>  /  TBili  x   /  DBili  x   /  AST  x   /  ALT  x   /  AlkPhos  x         Urinalysis Basic - ( 30 Oct 2022 18:21 )    Color: Yellow / Appearance: Clear / S.020 / pH: x  Gluc: x / Ketone: NEGATIVE  / Bili: Negative / Urobili: 0.2 E.U./dL   Blood: x / Protein: NEGATIVE mg/dL / Nitrite: POSITIVE   Leuk Esterase: Small / RBC: < 5 /HPF / WBC > 10 /HPF   Sq Epi: x / Non Sq Epi: 0-5 /HPF / Bacteria: Many /HPF              RADIOLOGY & ADDITIONAL TESTS:  Reviewed HOSPITAL COURSE:  80yo Cantonese/English speaking male, former smoker noncompliant with PMHx of HTN, HLD, DM2, COPD (uses son's O2), CKD stage 3 presents with altered mental status and lethargy found to be in oliguric renal failure w/ hypercalcemia, swollen RUE and firm, fixed R supraclavicular lymphadenopathy. Likely 2/2 underlying malignancy. Family aware and elected to make patient DNR/DNI. Pursuing palliative care. Concern for RLL infiltrate and UA positive. Patient started on CTX and azithromycin. Renal function unchanged, however Ca improving with fluids and no evidence of fluid overload. UOP responded well to lasix on . UA grew E faecalis. Abx d/c as low suspicion for UTI and limited concern for CAP. Patient periodically desaturates requiring NRB or positive pressure ventilation. Otherwise tolerates RA/NC. Suspect 2/2 episodic atelectasis On 1hr CPAP Q6H to prevent atelectasis. Currently puree diet.     INTERVAL HPI/OVERNIGHT EVENTS:  O/N: Patient agitated overnight. Likely . All other vitals stable   This morning: Patient was seen and examined at bedside. Mental status unchanged. Tolerating HFNC well.      VITAL SIGNS:  T(F): 97.8 (22 @ 15:10)  HR: 73 (22 @ 13:33)  BP: 127/60 (22 @ 12:22)  RR: 20 (22 @ 13:33)  SpO2: 98% (22 @ 13:33)  Wt(kg): --    PHYSICAL EXAM:    General: Disoriented   HEENT: NC/AT; PERRLA, mucus membranes dry  Neck: supple, trachea midline  Cardiovascular: Distant heart sounds, +S1/S2; NO M/R/G. R sided firm, fixed supraclavicular lymphadenopathy.   Respiratory: Diminished breath sounds bilaterally. L more diminished than R. No accessory muscle use  Gastrointestinal: soft, NT/ND; +BSx4  Extremities: RUE with pitting edema to shoulder. No palpable cord. No asymmetric erythema/warmth. No LUE swelling/erythema/warmth. B/L LE w/o edema or tenderness to palpation   Vascular: Extremities cool. Palpable pulses in bilateral UE/LE  Neurological: AAOx1 (name); no focal deficits    MEDICATIONS  (STANDING):  albuterol/ipratropium for Nebulization 3 milliLiter(s) Nebulizer every 6 hours  amLODIPine   Tablet 10 milliGRAM(s) Oral every 24 hours  atorvastatin 20 milliGRAM(s) Oral at bedtime  cefTRIAXone   IVPB 1000 milliGRAM(s) IV Intermittent every 24 hours  dextrose 5%. 1000 milliLiter(s) (100 mL/Hr) IV Continuous <Continuous>  dextrose 5%. 1000 milliLiter(s) (50 mL/Hr) IV Continuous <Continuous>  dextrose 50% Injectable 25 Gram(s) IV Push once  dextrose 50% Injectable 12.5 Gram(s) IV Push once  dextrose 50% Injectable 25 Gram(s) IV Push once  glucagon  Injectable 1 milliGRAM(s) IntraMuscular once  heparin   Injectable 5000 Unit(s) SubCutaneous every 12 hours  insulin lispro (ADMELOG) corrective regimen sliding scale   SubCutaneous every 6 hours  isosorbide   mononitrate ER Tablet (IMDUR) 30 milliGRAM(s) Oral every 24 hours  QUEtiapine 12.5 milliGRAM(s) Oral at bedtime  sodium chloride 0.9%. 1000 milliLiter(s) (150 mL/Hr) IV Continuous <Continuous>    MEDICATIONS  (PRN):  acetaminophen     Tablet .. 650 milliGRAM(s) Oral every 6 hours PRN Temp greater or equal to 38C (100.4F), Mild Pain (1 - 3)  dextrose Oral Gel 15 Gram(s) Oral once PRN Blood Glucose LESS THAN 70 milliGRAM(s)/deciliter      Allergies    No Known Allergies    Intolerances        LABS:                        9.4    9.45  )-----------( 265      ( 2022 07:42 )             29.5     11-    140  |  100  |  63<H>  ----------------------------<  82  3.9   |  28  |  3.93<H>    Ca    11.8<H>      2022 07:42  Phos  3.6     11  Mg     2.9         TPro  x   /  Alb  2.2<L>  /  TBili  x   /  DBili  x   /  AST  x   /  ALT  x   /  AlkPhos  x         Urinalysis Basic - ( 30 Oct 2022 18:21 )    Color: Yellow / Appearance: Clear / S.020 / pH: x  Gluc: x / Ketone: NEGATIVE  / Bili: Negative / Urobili: 0.2 E.U./dL   Blood: x / Protein: NEGATIVE mg/dL / Nitrite: POSITIVE   Leuk Esterase: Small / RBC: < 5 /HPF / WBC > 10 /HPF   Sq Epi: x / Non Sq Epi: 0-5 /HPF / Bacteria: Many /HPF              RADIOLOGY & ADDITIONAL TESTS:  Reviewed

## 2022-11-01 NOTE — CONSULT NOTE ADULT - CONVERSATION DETAILS
In addition to the EM visit, an advance care planning meeting was performed  Start time: 110pm  End time: 130pm  Total time: 20 minutes   A face to face meeting to discuss advance care planning was held today regarding: LIANNA SMITH  Primary decision maker: Patient is able to make decisions for himself  Alternate/surrogate:  Emil Smith / Simone Smith   Discussed advance directives including, but not limited to, healthcare proxy and code status.  Decision regarding code status: DNR/DNI  Documentation completed today: ALYCIA in chart    Discussion had with two sons that were present. They explained what there understanding about what was going on with there dad was. They explained that the patient has said several times that he is dying. They understand that there is a high chance of malignancy but they do not want to work anything given as they do not want to cause any more suffering and believe that there father would not tolerate any treatment that would be recommended to him. It was explained that the high flow nasal canula might be prolonging his suffering and there were understanding, but need to talk to there other brothers prior to withdrawing any care. Inpatient hospice was discussed and explained if the goal is symptom directed care it would be the best place for him. They confirmed that he should remain a DNR/DNI. Emotional support was provided.

## 2022-11-01 NOTE — CONSULT NOTE ADULT - PROBLEM SELECTOR RECOMMENDATION 4
Patient with an undiagnosed malignancy which family does not want to work up given patient condition. Currently patient remains on High flow. Would be a good hospice candidate if continues to have symptoms, once High flow is removed. Family wants patient comfortable.

## 2022-11-01 NOTE — PROGRESS NOTE ADULT - PROBLEM SELECTOR PLAN 10
F: LR 90cc/hr  E: Replete Mg>2, K>4  N: NPO  P: Heparin 5000u q12h - continue with sliding scale, monitor FSG

## 2022-11-01 NOTE — PROGRESS NOTE ADULT - PROBLEM SELECTOR PLAN 7
- on amlodipine 10mg at home, continue as BP tolerates  - on imdur 30mg extended release, continue as BP tolerates #Asthma/COPD  - on 2L O2, saturating 97%   - patient's son reports possible copd diagnosis, former smoker   - documentation reports asthma hx  - no wheezing on exam   - continue with duoneb prn, on proair prn at home   - wean off O2

## 2022-11-01 NOTE — PROGRESS NOTE ADULT - PROBLEM SELECTOR PLAN 1
DDx for hypercalcemia includes severe dehydration, immobility, multiple myeloma, hypercalcemia 2/2 undiagnosed malignancy, hypothyroidism. S/p 1L bolus of LR. Patient with significant hypovolemia on exam  Corrected Calcium 16.1-->15.8. Total protein 3.3 (L), Protein gap 2.1 (albumin 1.2). TSH 2.36 (wnl)   UA showed few calcium oxalate crystals.   - LR @90cc/hr; gentle fluid resuscitation i/s/o oliguric ENZO  - s/p calcitonin 160u q12h, for 2 doses; improving  - Oliguric  - Defer bisphosphonate i/s/o depressed CrCl  - Given new RUE edema, supraclavicular lymphadenopathy and hypercalcemia, suspect patient may have primary lung malignancy  - f/u: PTH, PTHrP, ionized calcium, albumin, SPEP, UPEP, Vit D Patient intermittently desaturating requiring NRB/HFNC Patient intermittently desaturating requiring NRB/HFNC. Otherwise tolerates RA. CXR/POCUS w/ small R pleural effusion. Given episodic nature of desaturation suspect intermittent atelectasis, relieved with positive pressure.   -Q6H CPAP  -NC as tolerated

## 2022-11-01 NOTE — CONSULT NOTE ADULT - SUBJECTIVE AND OBJECTIVE BOX
LIANNA SMITH          MRN-[id number]            (1940)    HPI:  82yo, Cantonese/English speaking, former smoker male PMHx HTN, asthma vs COPD (?) (no hospitalizations/intubations) HLD, DM2, HFpEF, presents with fatigue and weakness for two weeks. Per son at bedside, who is historian, patient has been feeling out of it, is less conversational and has reported some SOB. Patient was recently admitted a couple weeks ago to [facility] fo mechanical fall, and was admitted to cardiac telemetry for diastolic heart failure and IV lasix was given, TTE revealed normal EF. Patient was discharged to [facility] and has been at rehab for two weeks. At rehab, son reports patient with poor appetite / PO intake for two weeks. He has progressively gotten weaker. Patient's son reports prior to rehab, father lived at home by himself, independent in most ADLs. His other son went over 2 days per week. He reports chronic smoking and believes patient was diagnosed with COPD but is not sure, he uses pro-air occasionally.     ICU consult called for hypercalcemia, ~13.9. Patient has prior hypercalcemia, mild to 11. Not worked up. Son states patient was on hctz, but not on rehab med list. At rehab unclear if he was given diuresis but were concerned about hypocalcemia. Patient is currently AAOX2 (name and place), denies focal symptoms. Drowsy appearing. Denies chest pain, palpitations, dizziness, vomiting, abd pain, N/D, dysuria, HA.     ED vitals: T afebrile  HR 58  RR  /78 SpO2 97 on 2L   Labs significant ~Ca 13, BUN 66, Cr 3.96 (baseline ~2)   Imaging/EKG: EKG: sinus bradycardia, CXR: RLL opacity   Interventions: LR 500cc bolus   Consults: ICU   (30 Oct 2022 18:51)      PAST MEDICAL & SURGICAL HISTORY:  DM (diabetes mellitus)    HTN (hypertension)    HLD (hyperlipidemia)    No significant past surgical history    FAMILY HISTORY:   Reviewed and found non contributory in mother or father    SOCIAL HISTORY: Former smoker, quit 30-40years ago  Denies ETOH, recreational drug Use    PSYCHOSOCIAL ASSESSMENT:  Latter day/Spiritual practice: Unknown   Role of organized Judaism [ ] important [ x] some [ ] unable to assess dt pt mentation   Coping: [x ] well [ ] with difficulty [ ] poor coping [ ] unable to assess dt pt mentation  Support system: [ x] strong [ ] adequate [ ] inadequate    ROS:    Unable to attain due to:  n/a     Dyspnea (Matthew 0-10):    2                  N/V (Y/N):                 N             Secretions (Y/N) :    N            Agitation(Y/N): N  Pain (Y/N):     N  -Provocation/Palliation: n/a  -Quality/Quantity: n/a  -Radiating: n/a  -Severity: n/a  -Timing/Frequency: n/a  -Impact on ADLs: n/a    General:  + weakness  HEENT:    Denied  Neck:  Denied  CVS:  Denied  Resp:  Denied  GI:  Denied  :  Denied  Musc:  Denied  Neuro:  Denied  Psych:  Denied  Skin:  Denied  Lymph:  Denied    Allergies    No Known Allergies    Intolerances    Opiate Naive (Y/N): Y  -iStop reviewed (Y/N): Y (Ref#:  [id number])    Medications:      MEDICATIONS  (STANDING):  albuterol/ipratropium for Nebulization 3 milliLiter(s) Nebulizer every 6 hours  amLODIPine   Tablet 10 milliGRAM(s) Oral every 24 hours  atorvastatin 20 milliGRAM(s) Oral at bedtime  cefTRIAXone   IVPB 1000 milliGRAM(s) IV Intermittent every 24 hours  dextrose 5%. 1000 milliLiter(s) (100 mL/Hr) IV Continuous <Continuous>  dextrose 5%. 1000 milliLiter(s) (50 mL/Hr) IV Continuous <Continuous>  dextrose 50% Injectable 25 Gram(s) IV Push once  dextrose 50% Injectable 12.5 Gram(s) IV Push once  dextrose 50% Injectable 25 Gram(s) IV Push once  glucagon  Injectable 1 milliGRAM(s) IntraMuscular once  heparin   Injectable 5000 Unit(s) SubCutaneous every 12 hours  insulin lispro (ADMELOG) corrective regimen sliding scale   SubCutaneous every 6 hours  isosorbide   mononitrate ER Tablet (IMDUR) 30 milliGRAM(s) Oral every 24 hours  QUEtiapine 12.5 milliGRAM(s) Oral at bedtime  sodium chloride 0.9%. 1000 milliLiter(s) (150 mL/Hr) IV Continuous <Continuous>    MEDICATIONS  (PRN):  acetaminophen     Tablet .. 650 milliGRAM(s) Oral every 6 hours PRN Temp greater or equal to 38C (100.4F), Mild Pain (1 - 3)  dextrose Oral Gel 15 Gram(s) Oral once PRN Blood Glucose LESS THAN 70 milliGRAM(s)/deciliter    Labs:    CBC:                        9.4    9.45  )-----------( 265      ( 2022 07:42 )             29.5     CMP:        140  |  100  |  63<H>  ----------------------------<  82  3.9   |  28  |  3.93<H>    Ca    11.8<H>      2022 07:42  Phos  3.6       Mg     2.9         TPro  x   /  Alb  2.2<L>  /  TBili  x   /  DBili  x   /  AST  x   /  ALT  x   /  AlkPhos  x       Urinalysis Basic - ( 30 Oct 2022 18:21 )    Color: Yellow / Appearance: Clear / S.020 / pH: x  Gluc: x / Ketone: NEGATIVE  / Bili: Negative / Urobili: 0.2 E.U./dL   Blood: x / Protein: NEGATIVE mg/dL / Nitrite: POSITIVE   Leuk Esterase: Small / RBC: < 5 /HPF / WBC > 10 /HPF   Sq Epi: x / Non Sq Epi: 0-5 /HPF / Bacteria: Many /HPF    Imaging:  < from: US Duplex Venous Lower Ext Ltd, Right (10.30.22 @ 17:12) >    ACC: [id number] EXAM:  US DPLX LWR EXT VEINS LTD RT                          PROCEDURE DATE:  10/30/2022    IMPRESSION:  No evidence of right lower extremity deep venous thrombosis.    < end of copied text >    < from: Xray Chest 1 View- PORTABLE-Urgent (Xray Chest 1 View- PORTABLE-Urgent .) (10.30.22 @ 18:36) >  ACC: [id number] EXAM:  XR CHEST PORTABLE URGENT 1V                          PROCEDURE DATE:  10/30/2022    IMPRESSION: Small bilateral pleural effusions.    < end of copied text >    PEx:  T(C): 36.7 (22 @ 10:33), Max: 36.7 (22 @ 10:33)  HR: 72 (22 @ 08:11) (72 - 80)  BP: 146/63 (22 @ 08:11) (109/53 - 146/76)  RR: 20 (22 @ 08:11) (15 - 20)  SpO2: 97% (22 @ 08:11) (89% - 100%)  Wt(kg): 40.8kg  Daily     Daily   CAPILLARY BLOOD GLUCOSE    POCT Blood Glucose.: 79 mg/dL (2022 12:27)    I&O's Summary    31 Oct 2022 07:  -  2022 07:00  --------------------------------------------------------  IN: 2550 mL / OUT: 950 mL / NET: 1600 mL    2022 07:01  -  2022 12:52  --------------------------------------------------------  IN: 450 mL / OUT: 240 mL / NET: 210 mL      GENERAL:  [x ]Alert  [ x]Oriented x 3  [ ]Lethargic  [ ]Cachexia  [ ]Unarousable  [x]Verbal  [ ]Non-Verbal  Behavioral:   [ ] Anxiety  [ ] Delirium [ ] Agitation [x ] Other - calm   HEENT:  [ ]Normal   [x ]Dry mouth   [ ]ET Tube/Trach  [ ]Oral lesions [x] High flow Nasal Canula  PULMONARY:   [X ]Clear  [ ]Tachypnea  [ ]Audible excessive secretions   [ ]Rhonchi        [ ]Right [ ]Left [ ]Bilateral  [ ]Crackles        [ ]Right [ ]Left [ ]Bilateral  [ ]Wheezing     [ ]Right [ ]Left [ ]Bilateral  CARDIOVASCULAR:    [X ]Regular [ ]Irregular [ ]Tachy  [ ]Hunter [ ]Murmur [ ]Other  GASTROINTESTINAL:  [X ]Soft  [ ]Distended   [ ]+BS  [X ]Non tender [ ]Tender  [ ]PEG [ ]OGT/ NGT  Last BM:   GENITOURINARY:  [ ]Normal [ ] Incontinent   [ ]Oliguria/Anuria   [X ]Tamez  MUSCULOSKELETAL:   [ ]Normal   [X ]Weakness  [ ]Bed/Wheelchair bound [ ]Edema  NEUROLOGIC:   [ ]No focal deficits  [ ] Cognitive impairment  [ ] Dysphagia [ ]Dysarthria [ ] Paresis [ ]Other   SKIN:   [X ]Normal   [ ]Pressure ulcer(s)  [ ]Rash    Preadmit Karnofsky:  60%           Current Karnofsky:   50  %  Cachexia (Y/N): Y  BMI: 17.0kg/m2    Advanced Directives:     DNR/DNI     MOL     HCP     DECISION MAKER: Patient is able to make decisions but defers to sons  LEGAL SURROGATE: Emil Smith [phone] / Simone Smith [phone] / Lucas Smith [phone]     GOALS OF CARE DISCUSSION       Palliative care info/counseling provided	           Family meeting       Advanced Directives addressed please see Advance Care Planning Note	           See previous Palliative Medicine Note       Documentation of GOC: 	 DNR/DNI          REFERRALS	        Palliative Med        Hospice

## 2022-11-01 NOTE — CONSULT NOTE ADULT - ASSESSMENT
81 year old man with SOB, debility, Hypercalcemia, Lung cancer, advance care planning and encounter for palliative care.

## 2022-11-01 NOTE — PROGRESS NOTE ADULT - PROBLEM SELECTOR PLAN 3
UA: +nitrites, small LE, >10WBC, many bacteria  - f/u urine cx  - c/w CTX 1g q24h Small R-sided effusion seen on POCUS. Procal 0.12 confounded by CKD. Given lack of focal lungs, no fever and no leukocytosis low suspicion for CAP  - d/c azithromycin   - CTX 1g q24h for UTI (day 1 10/30)  - monitor resp status Small R-sided effusion seen on POCUS. Procal 0.12 confounded by CKD. Given lack of focal lungs, no fever and no leukocytosis low suspicion for CAP  - d/c azithromycin   - d/c CTX  - monitor resp status

## 2022-11-01 NOTE — CONSULT NOTE ADULT - PROBLEM SELECTOR RECOMMENDATION 6
Patient remains a DNR/DNI. Spoke to both sons regarding there dad and they both would like to concentrate on symptom directed care, but need to talk to there other 3 brothers first. Please see [facility] note above for details.  As discussed during the palliative IDT meeting, the patients PSSA screening did not identify any current psychosocial need or spiritual support deficits.  - Church/Spiritual practice: Unknowns   - Coping: [x ] well [ ] with difficulty [ ] poor coping   - Support system: [x ] strong [ ] adequate [ ] inadequate  - All questions answered, emotional support provided  -  primary team   - Please contact Palliative Medicine 24/7 at [phone] for any acute symptoms or further questions  - Will continue to follow with you

## 2022-11-01 NOTE — PROVIDER CONTACT NOTE (HYPOGLYCEMIA EVENT) - NS PROVIDER CONTACT BACKGROUND-HYPO
Age: 81y    Gender: Male    POCT Blood Glucose:  111 mg/dL (11-01-22 @ 19:04)  74 mg/dL (11-01-22 @ 17:43)  79 mg/dL (11-01-22 @ 12:27)  81 mg/dL (11-01-22 @ 06:14)  97 mg/dL (10-31-22 @ 22:08)      eMAR:atorvastatin   20 milliGRAM(s) Oral (10-31-22 @ 22:19)    calcitonin Injectable   160 International Unit(s) IntraMuscular (11-01-22 @ 09:43)   160 International Unit(s) IntraMuscular (10-31-22 @ 20:36)    dextrose 50% Injectable   25 milliLiter(s) IV Push (11-01-22 @ 18:22)

## 2022-11-01 NOTE — CONSULT NOTE ADULT - PROBLEM SELECTOR RECOMMENDATION 9
Patient has SOB of breath and is currently on High flow nasal canula at 50L and 80% 02. He would benefit from Dilaudid 0.25mg q2h IV PRN, to help get him off high flow NC and to help him be more comfortable.

## 2022-11-01 NOTE — PROGRESS NOTE ADULT - PROBLEM SELECTOR PLAN 4
RUE with pitting edema to shoulder, possible DVT. Wells 4.5 (+3 for likely DVT RUE, +1.5 for HR>100). Ddimer 511  - f/u RUE Doppler UA: +nitrites, small LE, >10WBC, many bacteria  - Urine cx growing E. faecalis   - c/w CTX 1g q24h (day 1 10/30) UA: +nitrites, small LE, >10WBC, many bacteria. Difficult to assess urinary symptoms due to mental status. However no fever or leukocytosis to suggest infection.   - Urine cx growing E. faecalis   - d/c CTX

## 2022-11-01 NOTE — PROGRESS NOTE ADULT - PROBLEM SELECTOR PLAN 2
Small R-sided effusion seen on POCUS. Read pending on CXR. Procal 0.12 confounded by ?CKD  - started on azithromycin 500mg q24h & CTX 1g q24h for empiric CAP coverage  - monitor resp status DDx for hypercalcemia includes severe dehydration, immobility, multiple myeloma, hypercalcemia 2/2 undiagnosed malignancy, hypothyroidism. S/p 1L bolus of LR. Patient with significant hypovolemia on exam  Corrected Calcium 16.1-->15.8. Total protein 3.3 (L), Protein gap 2.1 (albumin 1.2). TSH 2.36 (wnl)   UA showed few calcium oxalate crystals.   - s/p LR @90cc/hr  - defer further fluids i/s/o oliguric ENZO  - s/p calcitonin 160u q12h, for 2 doses; improving  - Lasix 40mg IVP 11/1  - Defer bisphosphonate i/s/o depressed CrCl  - Given new RUE edema, supraclavicular lymphadenopathy and hypercalcemia, suspect patient may have primary lung malignancy  - f/u: PTH, PTHrP, ionized calcium, albumin, SPEP, UPEP, Vit D

## 2022-11-02 LAB
-  AMPICILLIN: SIGNIFICANT CHANGE UP
-  CIPROFLOXACIN: SIGNIFICANT CHANGE UP
-  NITROFURANTOIN: SIGNIFICANT CHANGE UP
-  TETRACYCLINE: SIGNIFICANT CHANGE UP
-  VANCOMYCIN: SIGNIFICANT CHANGE UP
ALBUMIN SERPL ELPH-MCNC: 1.9 G/DL — LOW (ref 3.3–5)
ALP SERPL-CCNC: 70 U/L — SIGNIFICANT CHANGE UP (ref 40–120)
ALT FLD-CCNC: 14 U/L — SIGNIFICANT CHANGE UP (ref 10–45)
ANION GAP SERPL CALC-SCNC: 12 MMOL/L — SIGNIFICANT CHANGE UP (ref 5–17)
ANISOCYTOSIS BLD QL: SLIGHT — SIGNIFICANT CHANGE UP
AST SERPL-CCNC: 20 U/L — SIGNIFICANT CHANGE UP (ref 10–40)
BASOPHILS # BLD AUTO: 0 K/UL — SIGNIFICANT CHANGE UP (ref 0–0.2)
BASOPHILS NFR BLD AUTO: 0 % — SIGNIFICANT CHANGE UP (ref 0–2)
BILIRUB SERPL-MCNC: 0.3 MG/DL — SIGNIFICANT CHANGE UP (ref 0.2–1.2)
BUN SERPL-MCNC: 53 MG/DL — HIGH (ref 7–23)
CA-I BLD-SCNC: 6.6 MG/DL — HIGH (ref 4.5–5.6)
CALCIUM SERPL-MCNC: 10.9 MG/DL — HIGH (ref 8.4–10.5)
CHLORIDE SERPL-SCNC: 105 MMOL/L — SIGNIFICANT CHANGE UP (ref 96–108)
CO2 SERPL-SCNC: 26 MMOL/L — SIGNIFICANT CHANGE UP (ref 22–31)
CREAT SERPL-MCNC: 3.71 MG/DL — HIGH (ref 0.5–1.3)
CULTURE RESULTS: SIGNIFICANT CHANGE UP
EGFR: 15 ML/MIN/1.73M2 — LOW
EOSINOPHIL # BLD AUTO: 0 K/UL — SIGNIFICANT CHANGE UP (ref 0–0.5)
EOSINOPHIL NFR BLD AUTO: 0 % — SIGNIFICANT CHANGE UP (ref 0–6)
GIANT PLATELETS BLD QL SMEAR: PRESENT — SIGNIFICANT CHANGE UP
GLUCOSE BLDC GLUCOMTR-MCNC: 114 MG/DL — HIGH (ref 70–99)
GLUCOSE BLDC GLUCOMTR-MCNC: 80 MG/DL — SIGNIFICANT CHANGE UP (ref 70–99)
GLUCOSE BLDC GLUCOMTR-MCNC: 93 MG/DL — SIGNIFICANT CHANGE UP (ref 70–99)
GLUCOSE SERPL-MCNC: 94 MG/DL — SIGNIFICANT CHANGE UP (ref 70–99)
HCT VFR BLD CALC: 28.4 % — LOW (ref 39–50)
HGB BLD-MCNC: 8.8 G/DL — LOW (ref 13–17)
HYPOCHROMIA BLD QL: SLIGHT — SIGNIFICANT CHANGE UP
LYMPHOCYTES # BLD AUTO: 0.07 K/UL — LOW (ref 1–3.3)
LYMPHOCYTES # BLD AUTO: 0.9 % — LOW (ref 13–44)
MACROCYTES BLD QL: SLIGHT — SIGNIFICANT CHANGE UP
MAGNESIUM SERPL-MCNC: 2.5 MG/DL — SIGNIFICANT CHANGE UP (ref 1.6–2.6)
MANUAL SMEAR VERIFICATION: SIGNIFICANT CHANGE UP
MCHC RBC-ENTMCNC: 27 PG — SIGNIFICANT CHANGE UP (ref 27–34)
MCHC RBC-ENTMCNC: 31 GM/DL — LOW (ref 32–36)
MCV RBC AUTO: 87.1 FL — SIGNIFICANT CHANGE UP (ref 80–100)
METHOD TYPE: SIGNIFICANT CHANGE UP
MICROCYTES BLD QL: SLIGHT — SIGNIFICANT CHANGE UP
MONOCYTES # BLD AUTO: 0.58 K/UL — SIGNIFICANT CHANGE UP (ref 0–0.9)
MONOCYTES NFR BLD AUTO: 7 % — SIGNIFICANT CHANGE UP (ref 2–14)
NEUTROPHILS # BLD AUTO: 7.59 K/UL — HIGH (ref 1.8–7.4)
NEUTROPHILS NFR BLD AUTO: 92.1 % — HIGH (ref 43–77)
ORGANISM # SPEC MICROSCOPIC CNT: SIGNIFICANT CHANGE UP
ORGANISM # SPEC MICROSCOPIC CNT: SIGNIFICANT CHANGE UP
OVALOCYTES BLD QL SMEAR: SLIGHT — SIGNIFICANT CHANGE UP
PHOSPHATE SERPL-MCNC: 3.4 MG/DL — SIGNIFICANT CHANGE UP (ref 2.5–4.5)
PLAT MORPH BLD: NORMAL — SIGNIFICANT CHANGE UP
PLATELET # BLD AUTO: 260 K/UL — SIGNIFICANT CHANGE UP (ref 150–400)
POLYCHROMASIA BLD QL SMEAR: SLIGHT — SIGNIFICANT CHANGE UP
POTASSIUM SERPL-MCNC: 3.6 MMOL/L — SIGNIFICANT CHANGE UP (ref 3.5–5.3)
POTASSIUM SERPL-SCNC: 3.6 MMOL/L — SIGNIFICANT CHANGE UP (ref 3.5–5.3)
PROT SERPL-MCNC: 5.8 G/DL — LOW (ref 6–8.3)
RBC # BLD: 3.26 M/UL — LOW (ref 4.2–5.8)
RBC # FLD: 15.2 % — HIGH (ref 10.3–14.5)
RBC BLD AUTO: ABNORMAL
SODIUM SERPL-SCNC: 143 MMOL/L — SIGNIFICANT CHANGE UP (ref 135–145)
SPECIMEN SOURCE: SIGNIFICANT CHANGE UP
SPHEROCYTES BLD QL SMEAR: SLIGHT — SIGNIFICANT CHANGE UP
WBC # BLD: 8.24 K/UL — SIGNIFICANT CHANGE UP (ref 3.8–10.5)
WBC # FLD AUTO: 8.24 K/UL — SIGNIFICANT CHANGE UP (ref 3.8–10.5)

## 2022-11-02 PROCEDURE — 99232 SBSQ HOSP IP/OBS MODERATE 35: CPT | Mod: GC

## 2022-11-02 PROCEDURE — 99233 SBSQ HOSP IP/OBS HIGH 50: CPT

## 2022-11-02 RX ORDER — POTASSIUM CHLORIDE 20 MEQ
40 PACKET (EA) ORAL ONCE
Refills: 0 | Status: COMPLETED | OUTPATIENT
Start: 2022-11-02 | End: 2022-11-02

## 2022-11-02 RX ORDER — ROBINUL 0.2 MG/ML
1 INJECTION INTRAMUSCULAR; INTRAVENOUS EVERY 6 HOURS
Refills: 0 | Status: DISCONTINUED | OUTPATIENT
Start: 2022-11-02 | End: 2022-11-03

## 2022-11-02 RX ORDER — HYDROMORPHONE HYDROCHLORIDE 2 MG/ML
0.25 INJECTION INTRAMUSCULAR; INTRAVENOUS; SUBCUTANEOUS
Refills: 0 | Status: DISCONTINUED | OUTPATIENT
Start: 2022-11-02 | End: 2022-11-04

## 2022-11-02 RX ADMIN — ISOSORBIDE MONONITRATE 30 MILLIGRAM(S): 60 TABLET, EXTENDED RELEASE ORAL at 07:27

## 2022-11-02 RX ADMIN — AMLODIPINE BESYLATE 10 MILLIGRAM(S): 2.5 TABLET ORAL at 07:27

## 2022-11-02 RX ADMIN — Medication 3 MILLILITER(S): at 06:26

## 2022-11-02 RX ADMIN — Medication 3 MILLILITER(S): at 12:26

## 2022-11-02 RX ADMIN — HYDROMORPHONE HYDROCHLORIDE 0.25 MILLIGRAM(S): 2 INJECTION INTRAMUSCULAR; INTRAVENOUS; SUBCUTANEOUS at 23:30

## 2022-11-02 RX ADMIN — Medication 40 MILLIEQUIVALENT(S): at 09:00

## 2022-11-02 RX ADMIN — HYDROMORPHONE HYDROCHLORIDE 0.25 MILLIGRAM(S): 2 INJECTION INTRAMUSCULAR; INTRAVENOUS; SUBCUTANEOUS at 12:25

## 2022-11-02 RX ADMIN — HYDROMORPHONE HYDROCHLORIDE 0.25 MILLIGRAM(S): 2 INJECTION INTRAMUSCULAR; INTRAVENOUS; SUBCUTANEOUS at 12:15

## 2022-11-02 RX ADMIN — HEPARIN SODIUM 5000 UNIT(S): 5000 INJECTION INTRAVENOUS; SUBCUTANEOUS at 06:27

## 2022-11-02 RX ADMIN — HYDROMORPHONE HYDROCHLORIDE 0.25 MILLIGRAM(S): 2 INJECTION INTRAMUSCULAR; INTRAVENOUS; SUBCUTANEOUS at 23:02

## 2022-11-02 NOTE — PROGRESS NOTE ADULT - PROBLEM SELECTOR PLAN 11
Quality 226: Preventive Care And Screening: Tobacco Use: Screening And Cessation Intervention: Patient screened for tobacco use and is an ex/non-smoker Quality 111:Pneumonia Vaccination Status For Older Adults: Pneumococcal Vaccination Previously Received Detail Level: Detailed Quality 110: Preventive Care And Screening: Influenza Immunization: Influenza Immunization Administered during Influenza season F: None  E: Replete Mg>2, K>4  N: NPO  P: Heparin 5000u q12h F: None  E: Replete Mg>2, K>4  N: Comfort feeds  P: Heparin 5000u q12h

## 2022-11-02 NOTE — PROGRESS NOTE ADULT - PROBLEM SELECTOR PLAN 2
DDx for hypercalcemia includes severe dehydration, immobility, multiple myeloma, hypercalcemia 2/2 undiagnosed malignancy, hypothyroidism. S/p 1L bolus of LR. Patient with significant hypovolemia on exam  Corrected Calcium remains >12. Total protein 3.3 (L), Protein gap 2.1 (albumin 1.2). TSH 2.36 (wnl)   UA showed few calcium oxalate crystals. Given new RUE edema, supraclavicular lymphadenopathy and hypercalcemia, suspect patient may have primary lung malignancy    - Will d/c fluids and treatment, transition to comfort care DDx for hypercalcemia includes severe dehydration, immobility, multiple myeloma, hypercalcemia 2/2 undiagnosed malignancy, hypothyroidism. S/p 1L bolus of LR. Patient with significant hypovolemia on exam  Corrected Calcium remains >12. Total protein 3.3 (L), Protein gap 2.1 (albumin 1.2). TSH 2.36 (wnl)   UA showed few calcium oxalate crystals. Given new RUE edema, supraclavicular lymphadenopathy and hypercalcemia, suspect patient may have primary lung malignancy. Pt does not want workup for malignancy at this time and prefers comfort measures, per discussion with palliative.     - Will d/c fluids and treatment, transition to comfort care  - s/p calcitonin 160u BID (10/31-11/1), s/p Lasix 40mg IVP 11/1

## 2022-11-02 NOTE — PROGRESS NOTE ADULT - PROBLEM SELECTOR PLAN 4
Patient with an undiagnosed malignancy which family does not want to work up given patient condition. Currently patient remains on BiPAP and appears uncomfortable. Would be a good hospice candidate if continues to have symptoms.

## 2022-11-02 NOTE — PROGRESS NOTE ADULT - SUBJECTIVE AND OBJECTIVE BOX
LIANNA SMITH             MRN-[id number]    CC: Weakness    HPI:  82yo, Cantonese/English speaking, former smoker male PMHx HTN, asthma vs COPD (?) (no hospitalizations/intubations) HLD, DM2, HFpEF, presents with fatigue and weakness for two weeks. Per son at bedside, who is historian, patient has been feeling out of it, is less conversational and has reported some SOB. Patient was recently admitted a couple weeks ago to [facility] fo mechanical fall, and was admitted to cardiac telemetry for diastolic heart failure and IV lasix was given, TTE revealed normal EF. Patient was discharged to [facility] and has been at rehab for two weeks. At rehab, son reports patient with poor appetite / PO intake for two weeks. He has progressively gotten weaker. Patient's son reports prior to rehab, father lived at home by himself, independent in most ADLs. His other son went over 2 days per week. He reports chronic smoking and believes patient was diagnosed with COPD but is not sure, he uses pro-air occasionally.     ICU consult called for hypercalcemia, ~13.9. Patient has prior hypercalcemia, mild to 11. Not worked up. Son states patient was on hctz, but not on rehab med list. At rehab unclear if he was given diuresis but were concerned about hypocalcemia. Patient is currently AAOX2 (name and place), denies focal symptoms. Drowsy appearing. Denies chest pain, palpitations, dizziness, vomiting, abd pain, N/D, dysuria, HA.     ED vitals: T afebrile  HR 58  RR  /78 SpO2 97 on 2L   Labs significant ~Ca 13, BUN 66, Cr 3.96 (baseline ~2)   Imaging/EKG: EKG: sinus bradycardia, CXR: RLL opacity   Interventions: LR 500cc bolus   Consults: ICU   (30 Oct 2022 18:51)    SUBJECTIVE: Patient in bed, minimally responsive with BIPAP mask on.     ROS:  DYSPNEA (Matthew): 2	  NAUS/VOM: N	  SECRETIONS: N	  AGITATION: N  Pain (Y/N):  N     -Provocation/Palliation: n/a   -Quality/Quantity: n/a  -Radiating: n/a  -Severity: n/a  -Timing/Frequency: n/a  -Impact on ADLs: n/a    OTHER REVIEW OF SYSTEMS: unable to obtain   UNABLE TO OBTAIN  due to: AMS    PEx:  T(C): 37.4 (11-02-22 @ 06:00), Max: 37.4 (11-02-22 @ 06:00)  HR: 88 (11-02-22 @ 10:15) (70 - 88)  BP: 115/58 (11-02-22 @ 08:40) (115/58 - 144/63)  RR: 15 (11-02-22 @ 10:15) (15 - 20)  SpO2: 91% (11-02-22 @ 10:15) (86% - 100%)  Wt(kg): 40.8kg    GENERAL:  [ ]Alert  [ ]Oriented x   [x ]Lethargic  [ ]Cachexia  [ ]Unarousable  [x]Verbal  [ ]Non-Verbal  Behavioral:   [ ] Anxiety  [ ] Delirium [ ] Agitation [x ] Other - calm   HEENT:  [ ]Normal   [x ]Dry mouth   [ ]ET Tube/Trach  [ ]Oral lesions [x] Bipap nasal canula  PULMONARY:   [X ]Clear  [ ]Tachypnea  [ ]Audible excessive secretions   [ ]Rhonchi        [ ]Right [ ]Left [ ]Bilateral  [ ]Crackles        [ ]Right [ ]Left [ ]Bilateral  [ ]Wheezing     [ ]Right [ ]Left [ ]Bilateral  CARDIOVASCULAR:    [X ]Regular [ ]Irregular [ ]Tachy  [ ]Hunter [ ]Murmur [ ]Other  GASTROINTESTINAL:  [X ]Soft  [ ]Distended   [ ]+BS  [X ]Non tender [ ]Tender  [ ]PEG [ ]OGT/ NGT  Last BM:   GENITOURINARY:  [ ]Normal [ ] Incontinent   [ ]Oliguria/Anuria   [X ]Tamez  MUSCULOSKELETAL:   [ ]Normal   [X ]Weakness  [ ]Bed/Wheelchair bound [ ]Edema  NEUROLOGIC:   [ ]No focal deficits  [ ] Cognitive impairment  [ ] Dysphagia [ ]Dysarthria [ ] Paresis [ ]Other   SKIN:   [X ]Normal   [ ]Pressure ulcer(s)  [ ]Rash    ALLERGIES: No Known Allergies      OPIATE NAÏVE (Y/N): Y    MEDICATIONS: REVIEWED  MEDICATIONS  (STANDING):  albuterol/ipratropium for Nebulization 3 milliLiter(s) Nebulizer every 6 hours  amLODIPine   Tablet 10 milliGRAM(s) Oral every 24 hours  atorvastatin 20 milliGRAM(s) Oral at bedtime  dextrose 5%. 1000 milliLiter(s) (100 mL/Hr) IV Continuous <Continuous>  dextrose 5%. 1000 milliLiter(s) (50 mL/Hr) IV Continuous <Continuous>  dextrose 50% Injectable 25 Gram(s) IV Push once  dextrose 50% Injectable 12.5 Gram(s) IV Push once  dextrose 50% Injectable 25 Gram(s) IV Push once  glucagon  Injectable 1 milliGRAM(s) IntraMuscular once  heparin   Injectable 5000 Unit(s) SubCutaneous every 12 hours  insulin lispro (ADMELOG) corrective regimen sliding scale   SubCutaneous every 6 hours  isosorbide   mononitrate ER Tablet (IMDUR) 30 milliGRAM(s) Oral every 24 hours  QUEtiapine 12.5 milliGRAM(s) Oral at bedtime  sodium chloride 0.9%. 1000 milliLiter(s) (100 mL/Hr) IV Continuous <Continuous>    MEDICATIONS  (PRN):  acetaminophen     Tablet .. 650 milliGRAM(s) Oral every 6 hours PRN Temp greater or equal to 38C (100.4F), Mild Pain (1 - 3)  dextrose Oral Gel 15 Gram(s) Oral once PRN Blood Glucose LESS THAN 70 milliGRAM(s)/deciliter    LABS: REVIEWED  CBC:                        8.8    8.24  )-----------( 260      ( 02 Nov 2022 07:06 )             28.4     CMP:    11-02    143  |  105  |  53<H>  ----------------------------<  94  3.6   |  26  |  3.71<H>    Ca    10.9<H>      02 Nov 2022 07:06  Phos  3.4     11-02  Mg     2.5     11-02    TPro  5.8<L>  /  Alb  1.9<L>  /  TBili  0.3  /  DBili  x   /  AST  20  /  ALT  14  /  AlkPhos  70  11-02    IMAGING: REVIEWED    Advanced Directives:     DNR/DNI     MOLST     HCP     DECISION MAKER: Patient is able to make decisions but defers to sons  LEGAL SURROGATE: Emil Smith [phone] / Simone Smith [phone] / Lucas Smith [phone]     PSYCHOSOCIAL-SPIRITUAL ASSESSMENT:       Reviewed       Care plan unchanged    GOALS OF CARE DISCUSSION       Palliative care info/counseling provided	           Family meeting       Advanced Directives addressed please see Advance Care Planning Note	           See previous Palliative Medicine Note       Documentation of GOC: DNR/DNI  	      AGENCY CHOICE DISCUSSED:             Hospice    REFERRALS	        Palliative Med        Hospice

## 2022-11-02 NOTE — PROGRESS NOTE ADULT - PROBLEM SELECTOR PLAN 6
ENZO on CKD (baseline ~Cr 2.09), on admission CR 3.89, FeUrea 52.1% (intrinsic) FeNa: 0.9% (pre-renal)  I/s/o dehydration 2/2 poor PO intake. UOP responded well to IV lasix. However, will d/c further workup and transition to comfort care.    - D/c lesvia ENZO on CKD (baseline ~Cr 2.09), on admission CR 3.89, FeUrea 52.1% (intrinsic) FeNa: 0.9% (pre-renal)  I/s/o dehydration 2/2 poor PO intake. UOP responded well to IV lasix. However, will d/c further workup and transition to comfort care.    - Tamez initially placed for strict Is &Os i/s/o oliguric renal failure  - dc'd Joi today (11/2 AM)

## 2022-11-02 NOTE — PROGRESS NOTE ADULT - PROBLEM SELECTOR PLAN 5
Patient remains a DNR/DNI. Will continue to manage patient symptoms. Per discussion with sons yesterday, sons stated that the patient understands that he is dying. Awaiting further discussions with patients sonEmil.  - Baptist/Spiritual practice: unknown   - Coping: [ ] well [ ] with difficulty [ ] poor coping  [x] unable to obtain  - Support system: [ x] strong [ ] adequate [ ] inadequate  - All questions answered, emotional support provided  -  primary team   - Please contact Palliative Medicine 24/7 at [phone] for any acute symptoms or further questions  - Will continue to follow with you Patient remains a DNR/DNI. Will continue to manage patient symptoms. Per discussion with sons yesterday, sons stated that the patient understands that he is dying. Awaiting further discussions with patients son, Emil.  Spoke to patient son Emil and after we got off the phone with the rest of the family, they would want to concentrate of the patient comfort. No more blood draws and medications for comfort.  - Church/Spiritual practice: unknown   - Coping: [ ] well [ ] with difficulty [ ] poor coping  [x] unable to obtain  - Support system: [ x] strong [ ] adequate [ ] inadequate  - All questions answered, emotional support provided  -  primary team   - Please contact Palliative Medicine 24/7 at [phone] for any acute symptoms or further questions  - Will continue to follow with you

## 2022-11-02 NOTE — PROGRESS NOTE ADULT - PROBLEM SELECTOR PLAN 1
Patient saturating well on 6L NC. CXR/POCUS w/ small R pleural effusion. Given episodic nature of desaturation suspect intermittent atelectasis, relieved with positive pressure. Patient was started on CPAP for 1hr q6hrs, however patient does not tolerate CPAP well.    - Discontinue CPAP, will transition pt to comfort measures  - NC as tolerated

## 2022-11-02 NOTE — PROGRESS NOTE ADULT - PROBLEM SELECTOR PLAN 5
"Encounter Date: 4/8/2018    SCRIBE #1 NOTE: I, Maryoj Charles, am scribing for, and in the presence of, Dr. Crowley.       History     Chief Complaint   Patient presents with    Post-op Problem     had a port placed on Wed and now having pain to area surrounding port, neck where it was threaded thru and into chest wall, all starting yesterday     Time seen by provider: 4:19 AM    This is a 19 y.o. male who presents with complaint of chest pain that has been constant since yesterday. The patient had a port placed on the right side of the chest four days ago and is experiencing pain to the area that is sharp and aching, pain to the right side of the neck that "feels like a pulled muscle," and pain to the left side of the chest that is pinching and stabbing. He notes that the right-sided chest pain has been present since the port was placed, but the left-sided chest pain began yesterday. The patient denies fever, chills, nausea, vomiting, diaphoresis, or SOB. He reports that he spoke to a doctor on call at [facility] twenty minutes ago who told him to go to the nearest ED for his symptoms.         The history is provided by the patient.     Review of patient's allergies indicates:   Allergen Reactions    Mushroom Anaphylaxis    Bamboo     Bee pollens     Mushroom flavor     Venom-honey bee      Past Medical History:   Diagnosis Date    Abdominal pain 08/12/2010    eval for abd and chest wall pain at [place], [place] - cxr wnl, EKG (RVH), troponin wnl, normal chemistries    Allergy     Cancer     testicular    Chondromalacia patellae     Chronic nausea 2015    eval by GI Dr. Tushar Artis - given zofran and ciproheptadine     Chronic pain     Chronic pain     [facility] - no meds; followed by psych and pain clinic and unresponsive to behavioral/CBT/old records reports c/f conversion disorder     Foot pain 04/2010    " "4/10 + SHIRA, eval by Dr. ALLY Lion at [facility] -dx unclear ? gout and trial of nsaids and pdn, xrays normal 1/11, referred to podiatry, re-eval by rheum 2/12 and MRI and films wnl    Fracture of right radius 09/2009    Lyme arthritis     multiple joints    Lyme disease 2013    Memory loss 03/2012    eval for memory loss by neuro at [facility] [place], EEG wnl. Dr. Patricio suggested emotional factors & ref to Tushar Davies, PhD for  eval & neuropsych eval 3/12 Lupe Delgado, PhD - no evidence of formal thought disorder or psychosis - documented severe memory impairment; not related to to ADD or executive function issues. sugesstion that memory issues may be related to "emotional factors", ?conversion d/o    Stress fracture of tibia and fibula 08/2011    Urticaria      History reviewed. No pertinent surgical history.  Family History   Problem Relation Age of Onset    Arthritis Mother     Other Mother      benign tumor of brain and spinal cord    Hyperlipidemia Father     Gout Father     No Known Problems Sister     Arthritis Sister      shoulder    Depression Sister      Social History   Substance Use Topics    Smoking status: Current Every Day Smoker     Packs/day: 0.25     Types: Vaping with nicotine, Vaping w/o nicotine, Cigarettes, Cigars    Smokeless tobacco: Never Used    Alcohol use Yes      Comment: occ     Review of Systems   Constitutional: Negative for chills, diaphoresis and fever.   HENT: Negative for congestion, sore throat and trouble swallowing.    Eyes: Negative for visual disturbance.   Respiratory: Negative for shortness of breath.    Cardiovascular: Positive for chest pain.   Gastrointestinal: Negative for abdominal pain, nausea and vomiting.   Endocrine: Negative for polydipsia, polyphagia and polyuria.   Genitourinary: Negative for dysuria.   Musculoskeletal: Positive for neck pain (right-sided.). Negative for back pain.   Skin: Negative for pallor.   Neurological: Negative for weakness and " headaches.   Psychiatric/Behavioral: Negative for confusion.       Physical Exam     Initial Vitals   BP Pulse Resp Temp SpO2   -- -- -- -- --      MAP       --         Physical Exam    Nursing note and vitals reviewed.  Constitutional: He appears well-developed and well-nourished. No distress.   HENT:   Head: Normocephalic and atraumatic.   Eyes: Conjunctivae and EOM are normal. Pupils are equal, round, and reactive to light.   Neck: Normal range of motion. Neck supple.   Cardiovascular: Normal rate and normal heart sounds.   Pulmonary/Chest: Effort normal and breath sounds normal. He exhibits tenderness.   Right-sided port. Reproducible tenderness to palpation of left chest wall. Point tenderness just above the left nipple.   Abdominal: Soft. Normal appearance and bowel sounds are normal. There is no tenderness.   Musculoskeletal: Normal range of motion.   Neurological: He is alert and oriented to person, place, and time. He has normal strength. No cranial nerve deficit or sensory deficit.   Skin: Skin is warm and dry.   Two incisions are clean, dry, and intact. Non- erythematous. Non-tender.   Psychiatric: He has a normal mood and affect. His behavior is normal. Thought content normal.         ED Course   Procedures  Labs Reviewed - No data to display   Imaging Results          X-Ray Chest 1 View (Final result)  Result time 04/08/18 04:48:22    Final result by Shonda Shearer MD (04/08/18 04:48:22)                 Impression:      1. No evidence of acute cardiopulmonary process on this single radiographic view of the chest.  2. Redemonstration of lobular density along the left upper heart border consistent with patient's known anterior mediastinal lesion.      Electronically signed by: Shonda Shearer MD  Date:    04/08/2018  Time:    04:48             Narrative:    EXAMINATION:  XR CHEST 1 VIEW    CLINICAL HISTORY:  Chest pain, unspecified    TECHNIQUE:  Single frontal view of the chest was  performed.    COMPARISON:  03/01/2018    FINDINGS:  There is a right-sided chest port in place with catheter tip projecting over the cavoatrial junction.  The cardiomediastinal silhouette is stable, noting there  is redemonstration of a lobular density along the left upper heart border, consistent with patient's known anterior mediastinal lesion.  The lungs are symmetrically expanded without evidence of focal consolidation, pneumothorax or pleural fluid.  Visualized osseous structures demonstrate no acute abnormalities.                                   X-Rays:   Independently Interpreted Readings:   Chest X-Ray: Port in place. No pneumothorax. Normal heart size. No infiltrate. No bony deformity. No acute disease.        Medical Decision Making:   Clinical Tests:   Radiological Study: Ordered and Reviewed  ED Management:  A 19-year-old male playing complains of right side as well as left sided chest pain status post 2 days from insertion of a Port-A-Cath.  The Port-A-Cath site is clean dry and intact.  No signs of infection.  No abscess.  I suspect this is musculoskeletal pain post the Port-A-Cath insertion.  He does have some easily reproducible tenderness to palpation of the left chest wall.  This is also likely secondary to the insertion of the catheter.  Chest x-ray was done showing no signs of pneumonia or pneumothorax.  I do not suspect this patient to have pulmonary embolus.  His vital signs are otherwise normal and he has no respiratory complaints.  I feel comfortable at this time discharge the patient to follow-up with oncology as an outpatient.    4:49 AM: Discussed case with Dr. Delgado, radiology. Will discharge and have the patient follow up with Dr. Harris.            Scribe Attestation:   Scribe #1: I performed the above scribed service and the documentation accurately describes the services I performed. I attest to the accuracy of the note.    Attending Attestation:           Physician Attestation for  Scribe:  Physician Attestation Statement for Scribe #1: I, Dr. Crowley, reviewed documentation, as scribed by Maryjo Charles in my presence, and it is both accurate and complete.                    Clinical Impression:     1. Chest pain                                 Leo Crowley, DO  04/08/18 0504     RUE with pitting edema to shoulder, possible DVT. Wells 4.5 (+3 for likely DVT RUE, +1.5 for HR>100). Ddimer 511  - f/u RUE Doppler

## 2022-11-02 NOTE — PROGRESS NOTE ADULT - PROBLEM SELECTOR PLAN 6
ENZO on CKD (baseline ~Cr 2.09), on admission CR 3.89, FeUrea 52.1% (intrinsic) FeNa: 0.9% (pre-renal)  I/s/o dehydration 2/2 poor PO intake  - Oliguric  - lasic 40mg IVP  - monitor strict I/Os ENZO on CKD (baseline ~Cr 2.09), on admission CR 3.89, FeUrea 52.1% (intrinsic) FeNa: 0.9% (pre-renal)  I/s/o dehydration 2/2 poor PO intake. UOP responded well to IV lasix. However, will d/c further workup and transition to comfort care.    - D/c lesvia

## 2022-11-02 NOTE — PROGRESS NOTE ADULT - PROBLEM SELECTOR PLAN 3
Small R-sided effusion seen on POCUS. Procal 0.12 confounded by CKD. Given lack of focal lungs, no fever and no leukocytosis low suspicion for CAP  - d/c azithromycin   - d/c CTX  - monitor resp status

## 2022-11-02 NOTE — PROGRESS NOTE ADULT - PROBLEM SELECTOR PLAN 5
RUE with pitting edema to shoulder, possible DVT. Wells 4.5 (+3 for likely DVT RUE, +1.5 for HR>100). Ddimer 511  - f/u RUE Doppler RUE with pitting edema to shoulder, possible DVT. Wells 4.5 (+3 for likely DVT RUE, +1.5 for HR>100). Ddimer 511    - pt now comfort measures, dc'd RUE Dopplers

## 2022-11-02 NOTE — PROGRESS NOTE ADULT - SUBJECTIVE AND OBJECTIVE BOX
******STEPDOWN FROM [facility] TO [facility]******    HOSPITAL COURSE:   82yo Cantonese/English speaking male, former smoker noncompliant with PMHx of HTN, HLD, DM2, COPD (uses son's O2), CKD stage 3 presents with altered mental status and lethargy found to be in oliguric renal failure w/ hypercalcemia, swollen RUE and firm, fixed R supraclavicular lymphadenopathy. Likely 2/2 underlying malignancy. Family aware and elected to make patient DNR/DNI. Pursuing palliative care. Concern for RLL infiltrate and UA positive. Patient started on CTX and azithromycin. Renal function unchanged, however Ca improving with fluids and no evidence of fluid overload. UOP responded well to lasix on 11/1. UA grew E faecalis. Abx d/c as low suspicion for UTI and limited concern for CAP. Patient periodically desaturates requiring NRB or positive pressure ventilation. Otherwise tolerates RA/NC. Suspect 2/2 episodic atelectasis On 1hr CPAP Q6H to prevent atelectasis. Currently puree diet. Pt with worsening delirium, started on seroquel 12.5mg qHS for agitation. GOC discussion had in conjunction with palliative care, patient's sons are in agreement to not take drastic measures to prolong life. Tamez discontinued on 11/2. Per palliative discussion with sons, patient will be made full comfort measures.      SUBJECTIVE:    VITAL SIGNS:  Vital Signs Last 24 Hrs  T(C): 37.2 (02 Nov 2022 14:14), Max: 37.4 (02 Nov 2022 06:00)  T(F): 99 (02 Nov 2022 14:14), Max: 99.4 (02 Nov 2022 06:00)  HR: 80 (02 Nov 2022 12:40) (75 - 88)  BP: 118/56 (02 Nov 2022 12:40) (115/58 - 144/63)  BP(mean): 80 (02 Nov 2022 12:40) (80 - 90)  RR: 17 (02 Nov 2022 12:40) (15 - 18)  SpO2: 100% (02 Nov 2022 12:40) (86% - 100%)    Parameters below as of 02 Nov 2022 12:40  Patient On (Oxygen Delivery Method): nasal cannula  O2 Flow (L/min): 6      PHYSICAL EXAM:  General: NAD; speaking in full sentences  HEENT: NC/AT; PERRL; EOMI; MMM  Neck: supple; no JVD  Cardiac: RRR; +S1/S2  Pulm: CTA B/L; no W/R/R  GI: soft, NT/ND, +BS  Extremities:  no edema, clubbing or cyanosis  Vasc: 2+ radial, DP pulses B/L  Neuro: AAOx3; no focal deficits    MEDICATIONS:  MEDICATIONS  (STANDING):  albuterol/ipratropium for Nebulization 3 milliLiter(s) Nebulizer every 6 hours  amLODIPine   Tablet 10 milliGRAM(s) Oral every 24 hours  atorvastatin 20 milliGRAM(s) Oral at bedtime  dextrose 5%. 1000 milliLiter(s) (100 mL/Hr) IV Continuous <Continuous>  dextrose 5%. 1000 milliLiter(s) (50 mL/Hr) IV Continuous <Continuous>  dextrose 50% Injectable 25 Gram(s) IV Push once  dextrose 50% Injectable 12.5 Gram(s) IV Push once  dextrose 50% Injectable 25 Gram(s) IV Push once  glucagon  Injectable 1 milliGRAM(s) IntraMuscular once  heparin   Injectable 5000 Unit(s) SubCutaneous every 12 hours  insulin lispro (ADMELOG) corrective regimen sliding scale   SubCutaneous every 6 hours  isosorbide   mononitrate ER Tablet (IMDUR) 30 milliGRAM(s) Oral every 24 hours  QUEtiapine 12.5 milliGRAM(s) Oral at bedtime  sodium chloride 0.9%. 1000 milliLiter(s) (100 mL/Hr) IV Continuous <Continuous>    MEDICATIONS  (PRN):  acetaminophen     Tablet .. 650 milliGRAM(s) Oral every 6 hours PRN Temp greater or equal to 38C (100.4F), Mild Pain (1 - 3)  dextrose Oral Gel 15 Gram(s) Oral once PRN Blood Glucose LESS THAN 70 milliGRAM(s)/deciliter  HYDROmorphone  Injectable 0.25 milliGRAM(s) IV Push every 2 hours PRN SOB / RR > 22      ALLERGIES:  Allergies    No Known Allergies    Intolerances        LABS:                        8.8    8.24  )-----------( 260      ( 02 Nov 2022 07:06 )             28.4     11-02    143  |  105  |  53<H>  ----------------------------<  94  3.6   |  26  |  3.71<H>    Ca    10.9<H>      02 Nov 2022 07:06  Phos  3.4     11-02  Mg     2.5     11-02    TPro  5.8<L>  /  Alb  1.9<L>  /  TBili  0.3  /  DBili  x   /  AST  20  /  ALT  14  /  AlkPhos  70  11-02        RADIOLOGY & ADDITIONAL TESTS: Reviewed. ******STEPDOWN FROM [address] TO [address] [place] [address]******    HOSPITAL COURSE:   82yo Cantonese/English speaking male, former smoker with PMHx of HTN, HLD, DM2, COPD (uses son's O2), CKD stage 3 presented with AMS and lethargy found to be in oliguric renal failure w/ hypercalcemia, swollen RUE and firm, fixed R supraclavicular lymphadenopathy, likely 2/2 underlying malignancy. Family aware and elected to make patient DNR/DNI. Initially started on CTX and azithro (10/30-10/31) for RLL infiltrate and positive UA; later dc'd due to low suspicion for PNA and UTI. Renal function unchanged, however Ca improving with fluids and bisphosphonates (10/31-11/1). Patient periodically desaturates requiring NRB/CPAP; otherwise tolerates RA/NC, likely 2/2 episodic atelectasis. Pt was started on seroquel 12.5mg qhs for agitation/worsening delirium. Per palliative discussion with pt's sons, pt is DNR/DNI, comfort measures. Tamez discontinued on 11/2. Pt was transferred from [facility] to [facility] for further management.     SUBJECTIVE:  Pt seen and examined at bedside.     VITAL SIGNS:  Vital Signs Last 24 Hrs  T(C): 37.2 (02 Nov 2022 14:14), Max: 37.4 (02 Nov 2022 06:00)  T(F): 99 (02 Nov 2022 14:14), Max: 99.4 (02 Nov 2022 06:00)  HR: 80 (02 Nov 2022 12:40) (75 - 88)  BP: 118/56 (02 Nov 2022 12:40) (115/58 - 144/63)  BP(mean): 80 (02 Nov 2022 12:40) (80 - 90)  RR: 17 (02 Nov 2022 12:40) (15 - 18)  SpO2: 100% (02 Nov 2022 12:40) (86% - 100%)    Parameters below as of 02 Nov 2022 12:40  Patient On (Oxygen Delivery Method): nasal cannula  O2 Flow (L/min): 6      PHYSICAL EXAM:  General: resting in bed in no acute distress; opens eyes periodically but not responding to commands   HEENT: NC/AT; PERRL; EOMI; MMM  Neck: R-sided firm, fixed supraclavicular lymphadenopathy   Cardiac: distant heart sounds; +S1/S2  Pulm: mildly decreased breath sounds b/l; no accessory muscle use; breathing on 5L NC   GI: soft, NT/ND, +BS  Extremities: RUE with pitting edema to R shoulder. No LUE edema. No LE edema b/l.   Vasc: 2+ radial pulses B/L  Neuro: resting in bed in no acute distress; opens eyes periodically but not responding to questions or commands    MEDICATIONS:  MEDICATIONS  (STANDING):  albuterol/ipratropium for Nebulization 3 milliLiter(s) Nebulizer every 6 hours  amLODIPine   Tablet 10 milliGRAM(s) Oral every 24 hours  atorvastatin 20 milliGRAM(s) Oral at bedtime  dextrose 5%. 1000 milliLiter(s) (100 mL/Hr) IV Continuous <Continuous>  dextrose 5%. 1000 milliLiter(s) (50 mL/Hr) IV Continuous <Continuous>  dextrose 50% Injectable 25 Gram(s) IV Push once  dextrose 50% Injectable 12.5 Gram(s) IV Push once  dextrose 50% Injectable 25 Gram(s) IV Push once  glucagon  Injectable 1 milliGRAM(s) IntraMuscular once  heparin   Injectable 5000 Unit(s) SubCutaneous every 12 hours  insulin lispro (ADMELOG) corrective regimen sliding scale   SubCutaneous every 6 hours  isosorbide   mononitrate ER Tablet (IMDUR) 30 milliGRAM(s) Oral every 24 hours  QUEtiapine 12.5 milliGRAM(s) Oral at bedtime  sodium chloride 0.9%. 1000 milliLiter(s) (100 mL/Hr) IV Continuous <Continuous>    MEDICATIONS  (PRN):  acetaminophen     Tablet .. 650 milliGRAM(s) Oral every 6 hours PRN Temp greater or equal to 38C (100.4F), Mild Pain (1 - 3)  dextrose Oral Gel 15 Gram(s) Oral once PRN Blood Glucose LESS THAN 70 milliGRAM(s)/deciliter  HYDROmorphone  Injectable 0.25 milliGRAM(s) IV Push every 2 hours PRN SOB / RR > 22      ALLERGIES:  Allergies    No Known Allergies    Intolerances        LABS:                        8.8    8.24  )-----------( 260      ( 02 Nov 2022 07:06 )             28.4     11-02    143  |  105  |  53<H>  ----------------------------<  94  3.6   |  26  |  3.71<H>    Ca    10.9<H>      02 Nov 2022 07:06  Phos  3.4     11-02  Mg     2.5     11-02    TPro  5.8<L>  /  Alb  1.9<L>  /  TBili  0.3  /  DBili  x   /  AST  20  /  ALT  14  /  AlkPhos  70  11-02        RADIOLOGY & ADDITIONAL TESTS: Reviewed. ******STEPDOWN FROM [address] TO [address] [place] [address]******    HOSPITAL COURSE:   80yo Cantonese/English speaking male, former smoker with PMHx of HTN, HLD, DM2, COPD (uses son's O2), CKD stage 3 presented with AMS and lethargy, admitted for oliguric renal failure w/ hypercalcemia. Pt with swollen RUE and firm, fixed R supraclavicular lymphadenopathy on presentation, suspicious for malignancy, family aware and does not wish to pursue cancer workup and elected to make patient DNR/DNI. Pt initially started on CTX and azithro (10/30-10/31) for RLL infiltrate and positive UA; later dc'd due to low suspicion for PNA and UTI. Renal function unchanged, however Ca improving with fluids and calcitonin (10/31-11/1). Patient periodically desaturates requiring NRB/CPAP, likely 2/2 episodic atelectasis; otherwise tolerates RA/NC. Pt was started on seroquel 12.5mg qhs for agitation/worsening delirium. Per palliative discussion with pt's sons, pt is DNR/DNI, comfort measures. Tamez discontinued on 11/2. Pt was transferred from [facility] to [facility] for further management.     SUBJECTIVE:  Pt seen and examined at bedside. Not in any acute distress. Opens eyes to voice but not replying to questions or commands.    VITAL SIGNS:  Vital Signs Last 24 Hrs  T(C): 37.2 (02 Nov 2022 14:14), Max: 37.4 (02 Nov 2022 06:00)  T(F): 99 (02 Nov 2022 14:14), Max: 99.4 (02 Nov 2022 06:00)  HR: 80 (02 Nov 2022 12:40) (75 - 88)  BP: 118/56 (02 Nov 2022 12:40) (115/58 - 144/63)  BP(mean): 80 (02 Nov 2022 12:40) (80 - 90)  RR: 17 (02 Nov 2022 12:40) (15 - 18)  SpO2: 100% (02 Nov 2022 12:40) (86% - 100%)    Parameters below as of 02 Nov 2022 12:40  Patient On (Oxygen Delivery Method): nasal cannula  O2 Flow (L/min): 6      PHYSICAL EXAM:  General: resting in bed in no acute distress; opens eyes periodically but not responding to commands   HEENT: NC/AT; PERRL; EOMI; MMM  Neck: R-sided firm, fixed supraclavicular lymphadenopathy   Cardiac: distant heart sounds; +S1/S2  Pulm: mildly decreased breath sounds b/l; no accessory muscle use; breathing on 5L NC   GI: soft, NT/ND, +BS  Extremities: RUE with pitting edema to R shoulder. No LUE edema. No LE edema b/l.   Vasc: 2+ radial pulses B/L  Neuro: resting in bed in no acute distress; opens eyes periodically but not responding to questions or commands    MEDICATIONS:  MEDICATIONS  (STANDING):  albuterol/ipratropium for Nebulization 3 milliLiter(s) Nebulizer every 6 hours  amLODIPine   Tablet 10 milliGRAM(s) Oral every 24 hours  atorvastatin 20 milliGRAM(s) Oral at bedtime  dextrose 5%. 1000 milliLiter(s) (100 mL/Hr) IV Continuous <Continuous>  dextrose 5%. 1000 milliLiter(s) (50 mL/Hr) IV Continuous <Continuous>  dextrose 50% Injectable 25 Gram(s) IV Push once  dextrose 50% Injectable 12.5 Gram(s) IV Push once  dextrose 50% Injectable 25 Gram(s) IV Push once  glucagon  Injectable 1 milliGRAM(s) IntraMuscular once  heparin   Injectable 5000 Unit(s) SubCutaneous every 12 hours  insulin lispro (ADMELOG) corrective regimen sliding scale   SubCutaneous every 6 hours  isosorbide   mononitrate ER Tablet (IMDUR) 30 milliGRAM(s) Oral every 24 hours  QUEtiapine 12.5 milliGRAM(s) Oral at bedtime  sodium chloride 0.9%. 1000 milliLiter(s) (100 mL/Hr) IV Continuous <Continuous>    MEDICATIONS  (PRN):  acetaminophen     Tablet .. 650 milliGRAM(s) Oral every 6 hours PRN Temp greater or equal to 38C (100.4F), Mild Pain (1 - 3)  dextrose Oral Gel 15 Gram(s) Oral once PRN Blood Glucose LESS THAN 70 milliGRAM(s)/deciliter  HYDROmorphone  Injectable 0.25 milliGRAM(s) IV Push every 2 hours PRN SOB / RR > 22      ALLERGIES:  Allergies    No Known Allergies    Intolerances        LABS:                        8.8    8.24  )-----------( 260      ( 02 Nov 2022 07:06 )             28.4     11-02    143  |  105  |  53<H>  ----------------------------<  94  3.6   |  26  |  3.71<H>    Ca    10.9<H>      02 Nov 2022 07:06  Phos  3.4     11-02  Mg     2.5     11-02    TPro  5.8<L>  /  Alb  1.9<L>  /  TBili  0.3  /  DBili  x   /  AST  20  /  ALT  14  /  AlkPhos  70  11-02        RADIOLOGY & ADDITIONAL TESTS: Reviewed.

## 2022-11-02 NOTE — PROGRESS NOTE ADULT - SUBJECTIVE AND OBJECTIVE BOX
******INCOMPLETE******    ******STEPDOWN FROM 7LACHMAN TO 7WOLLMAN******    Patient is a 81y old Male who presents with a chief complaint of Weakness (02 Nov 2022 10:40)    HOSPITAL COURSE: 80yo Cantonese/English speaking male, former smoker noncompliant with PMHx of HTN, HLD, DM2, COPD (uses son's O2), CKD stage 3 presents with altered mental status and lethargy found to be in oliguric renal failure w/ hypercalcemia, swollen RUE and firm, fixed R supraclavicular lymphadenopathy. Likely 2/2 underlying malignancy. Family aware and elected to make patient DNR/DNI. Pursuing palliative care. Concern for RLL infiltrate and UA positive. Patient started on CTX and azithromycin. Renal function unchanged, however Ca improving with fluids and no evidence of fluid overload. UOP responded well to lasix on 11/1. UA grew E faecalis. Abx d/c as low suspicion for UTI and limited concern for CAP. Patient periodically desaturates requiring NRB or positive pressure ventilation. Otherwise tolerates RA/NC. Suspect 2/2 episodic atelectasis On 1hr CPAP Q6H to prevent atelectasis. Currently puree diet. Pt with worsening delirium, started on seroquel 12.5mg qHS for agitation. GOC discussion had in conjunction with palliative care, patient's sons are in agreement to not take drastic measures to prolong life. Tamez discontinued on 11/2. Will stepdown in advance of final family discussion with 5 sons to transition to total comfort care.    OVERNIGHT EVENTS/INTERVAL HPI:    REVIEW OF SYSTEMS:  All other review of systems is negative unless indicated above.    OBJECTIVE:  T(C): 37 (11-02-22 @ 09:51), Max: 37.4 (11-02-22 @ 06:00)  HR: 80 (11-02-22 @ 12:40) (73 - 88)  BP: 118/56 (11-02-22 @ 12:40) (115/58 - 144/63)  RR: 17 (11-02-22 @ 12:40) (15 - 20)  SpO2: 100% (11-02-22 @ 12:40) (86% - 100%)  Daily     Daily     Physical Exam:  General: in no acute distress  Eyes: EOMI intact bilaterally. Anicteric sclerae, moist conjunctivae  HENT: Moist mucous membranes  Neck: Trachea midline, supple  Lungs: CTA B/L. No wheezes, rales, or rhonchi  Cardiovascular: RRR. No murmurs, rubs, or gallops  Abdomen: Soft, non-tender non-distended; No rebound or guarding  Extremities: WWP, No clubbing, cyanosis or edema  MSK: No midline bony tenderness. No CVA tenderness bilaterally  Neurological: Alert and oriented x3  Skin: Warm and dry. No obvious rash     Medications:  MEDICATIONS  (STANDING):  albuterol/ipratropium for Nebulization 3 milliLiter(s) Nebulizer every 6 hours  amLODIPine   Tablet 10 milliGRAM(s) Oral every 24 hours  atorvastatin 20 milliGRAM(s) Oral at bedtime  dextrose 5%. 1000 milliLiter(s) (100 mL/Hr) IV Continuous <Continuous>  dextrose 5%. 1000 milliLiter(s) (50 mL/Hr) IV Continuous <Continuous>  dextrose 50% Injectable 25 Gram(s) IV Push once  dextrose 50% Injectable 12.5 Gram(s) IV Push once  dextrose 50% Injectable 25 Gram(s) IV Push once  glucagon  Injectable 1 milliGRAM(s) IntraMuscular once  heparin   Injectable 5000 Unit(s) SubCutaneous every 12 hours  insulin lispro (ADMELOG) corrective regimen sliding scale   SubCutaneous every 6 hours  isosorbide   mononitrate ER Tablet (IMDUR) 30 milliGRAM(s) Oral every 24 hours  QUEtiapine 12.5 milliGRAM(s) Oral at bedtime  sodium chloride 0.9%. 1000 milliLiter(s) (100 mL/Hr) IV Continuous <Continuous>    MEDICATIONS  (PRN):  acetaminophen     Tablet .. 650 milliGRAM(s) Oral every 6 hours PRN Temp greater or equal to 38C (100.4F), Mild Pain (1 - 3)  dextrose Oral Gel 15 Gram(s) Oral once PRN Blood Glucose LESS THAN 70 milliGRAM(s)/deciliter  HYDROmorphone  Injectable 0.25 milliGRAM(s) IV Push every 2 hours PRN SOB / RR > 22      Labs:                        8.8    8.24  )-----------( 260      ( 02 Nov 2022 07:06 )             28.4     11-02    143  |  105  |  53<H>  ----------------------------<  94  3.6   |  26  |  3.71<H>    Ca    10.9<H>      02 Nov 2022 07:06  Phos  3.4     11-02  Mg     2.5     11-02    TPro  5.8<L>  /  Alb  1.9<L>  /  TBili  0.3  /  DBili  x   /  AST  20  /  ALT  14  /  AlkPhos  70  11-02        COVID-19 PCR: Negative (30 Oct 2022 13:33)  COVID-19 PCR: Negative (09 Oct 2022 12:39)  COVID-19 PCR: Negative (27 Sep 2022 21:25)      Radiology: Reviewed ******INCOMPLETE******    ******STEPDOWN FROM 7LACHMAN TO 7WOLLMAN******    Patient is a 81y old Male who presents with a chief complaint of Weakness (02 Nov 2022 10:40)    HOSPITAL COURSE: 82yo Cantonese/English speaking male, former smoker noncompliant with PMHx of HTN, HLD, DM2, COPD (uses son's O2), CKD stage 3 presents with altered mental status and lethargy found to be in oliguric renal failure w/ hypercalcemia, swollen RUE and firm, fixed R supraclavicular lymphadenopathy. Likely 2/2 underlying malignancy. Family aware and elected to make patient DNR/DNI. Pursuing palliative care. Concern for RLL infiltrate and UA positive. Patient started on CTX and azithromycin. Renal function unchanged, however Ca improving with fluids and no evidence of fluid overload. UOP responded well to lasix on 11/1. UA grew E faecalis. Abx d/c as low suspicion for UTI and limited concern for CAP. Patient periodically desaturates requiring NRB or positive pressure ventilation. Otherwise tolerates RA/NC. Suspect 2/2 episodic atelectasis On 1hr CPAP Q6H to prevent atelectasis. Currently puree diet. Pt with worsening delirium, started on seroquel 12.5mg qHS for agitation. GOC discussion had in conjunction with palliative care, patient's sons are in agreement to not take drastic measures to prolong life. Tamez discontinued on 11/2. Will stepdown in advance of final family discussion with 5 sons to transition to total comfort care.    OVERNIGHT EVENTS/INTERVAL HPI: Patient was placed on CPAP for 1 hour overnight.    REVIEW OF SYSTEMS:  All other review of systems is negative unless indicated above.    OBJECTIVE:  T(C): 37 (11-02-22 @ 09:51), Max: 37.4 (11-02-22 @ 06:00)  HR: 80 (11-02-22 @ 12:40) (73 - 88)  BP: 118/56 (11-02-22 @ 12:40) (115/58 - 144/63)  RR: 17 (11-02-22 @ 12:40) (15 - 20)  SpO2: 100% (11-02-22 @ 12:40) (86% - 100%)  Daily     Daily     Physical Exam:  HEENT: NC/AT; PERRLA, mucus membranes dry  Neck: supple, trachea midline  Cardiovascular: Distant heart sounds, +S1/S2; NO M/R/G. R sided firm, fixed supraclavicular lymphadenopathy.   Respiratory: Mildly diminshed breath sounds bilaterally. No accessory muscle use  Gastrointestinal: soft, NT/ND; +BSx4  Extremities: RUE with pitting edema to shoulder. No palpable cord. No asymmetric erythema/warmth. No LUE swelling/erythema/warmth. B/L LE w/o edema or tenderness to palpation   Vascular: Extremities cool. Palpable pulses in bilateral UE/LE  Neurological: AAOx1 (name); no focal deficits    Medications:  MEDICATIONS  (STANDING):  albuterol/ipratropium for Nebulization 3 milliLiter(s) Nebulizer every 6 hours  amLODIPine   Tablet 10 milliGRAM(s) Oral every 24 hours  atorvastatin 20 milliGRAM(s) Oral at bedtime  dextrose 5%. 1000 milliLiter(s) (100 mL/Hr) IV Continuous <Continuous>  dextrose 5%. 1000 milliLiter(s) (50 mL/Hr) IV Continuous <Continuous>  dextrose 50% Injectable 25 Gram(s) IV Push once  dextrose 50% Injectable 12.5 Gram(s) IV Push once  dextrose 50% Injectable 25 Gram(s) IV Push once  glucagon  Injectable 1 milliGRAM(s) IntraMuscular once  heparin   Injectable 5000 Unit(s) SubCutaneous every 12 hours  insulin lispro (ADMELOG) corrective regimen sliding scale   SubCutaneous every 6 hours  isosorbide   mononitrate ER Tablet (IMDUR) 30 milliGRAM(s) Oral every 24 hours  QUEtiapine 12.5 milliGRAM(s) Oral at bedtime  sodium chloride 0.9%. 1000 milliLiter(s) (100 mL/Hr) IV Continuous <Continuous>    MEDICATIONS  (PRN):  acetaminophen     Tablet .. 650 milliGRAM(s) Oral every 6 hours PRN Temp greater or equal to 38C (100.4F), Mild Pain (1 - 3)  dextrose Oral Gel 15 Gram(s) Oral once PRN Blood Glucose LESS THAN 70 milliGRAM(s)/deciliter  HYDROmorphone  Injectable 0.25 milliGRAM(s) IV Push every 2 hours PRN SOB / RR > 22      Labs:                        8.8    8.24  )-----------( 260      ( 02 Nov 2022 07:06 )             28.4     11-02    143  |  105  |  53<H>  ----------------------------<  94  3.6   |  26  |  3.71<H>    Ca    10.9<H>      02 Nov 2022 07:06  Phos  3.4     11-02  Mg     2.5     11-02    TPro  5.8<L>  /  Alb  1.9<L>  /  TBili  0.3  /  DBili  x   /  AST  20  /  ALT  14  /  AlkPhos  70  11-02        COVID-19 PCR: Negative (30 Oct 2022 13:33)  COVID-19 PCR: Negative (09 Oct 2022 12:39)  COVID-19 PCR: Negative (27 Sep 2022 21:25)      Radiology: Reviewed ******INCOMPLETE******    ******STEPDOWN FROM 7LACHMAN TO 7WOLLMAN******    Patient is a 81y old Male who presents with a chief complaint of Weakness (02 Nov 2022 10:40)    HOSPITAL COURSE: 80yo Cantonese/English speaking male, former smoker noncompliant with PMHx of HTN, HLD, DM2, COPD (uses son's O2), CKD stage 3 presents with altered mental status and lethargy found to be in oliguric renal failure w/ hypercalcemia, swollen RUE and firm, fixed R supraclavicular lymphadenopathy. Likely 2/2 underlying malignancy. Family aware and elected to make patient DNR/DNI. Pursuing palliative care. Concern for RLL infiltrate and UA positive. Patient started on CTX and azithromycin. Renal function unchanged, however Ca improving with fluids and no evidence of fluid overload. UOP responded well to lasix on 11/1. UA grew E faecalis. Abx d/c as low suspicion for UTI and limited concern for CAP. Patient periodically desaturates requiring NRB or positive pressure ventilation. Otherwise tolerates RA/NC. Suspect 2/2 episodic atelectasis On 1hr CPAP Q6H to prevent atelectasis. Currently puree diet. Pt with worsening delirium, started on seroquel 12.5mg qHS for agitation. GOC discussion had in conjunction with palliative care, patient's sons are in agreement to not take drastic measures to prolong life. Tamez discontinued on 11/2. Per palliative discussion with sons, patient will be made full comfort measures.    OVERNIGHT EVENTS/INTERVAL HPI: Patient was placed on CPAP for 1 hour overnight.    REVIEW OF SYSTEMS:  All other review of systems is negative unless indicated above.    OBJECTIVE:  T(C): 37 (11-02-22 @ 09:51), Max: 37.4 (11-02-22 @ 06:00)  HR: 80 (11-02-22 @ 12:40) (73 - 88)  BP: 118/56 (11-02-22 @ 12:40) (115/58 - 144/63)  RR: 17 (11-02-22 @ 12:40) (15 - 20)  SpO2: 100% (11-02-22 @ 12:40) (86% - 100%)  Daily     Daily     Physical Exam:  HEENT: NC/AT; PERRLA, mucus membranes dry  Neck: supple, trachea midline  Cardiovascular: Distant heart sounds, +S1/S2; NO M/R/G. R sided firm, fixed supraclavicular lymphadenopathy.   Respiratory: Mildly diminshed breath sounds bilaterally. No accessory muscle use  Gastrointestinal: soft, NT/ND; +BSx4  Extremities: RUE with pitting edema to shoulder. No palpable cord. No asymmetric erythema/warmth. No LUE swelling/erythema/warmth. B/L LE w/o edema or tenderness to palpation   Vascular: Extremities cool. Palpable pulses in bilateral UE/LE  Neurological: AAOx1 (name); no focal deficits    Medications:  MEDICATIONS  (STANDING):  albuterol/ipratropium for Nebulization 3 milliLiter(s) Nebulizer every 6 hours  amLODIPine   Tablet 10 milliGRAM(s) Oral every 24 hours  atorvastatin 20 milliGRAM(s) Oral at bedtime  dextrose 5%. 1000 milliLiter(s) (100 mL/Hr) IV Continuous <Continuous>  dextrose 5%. 1000 milliLiter(s) (50 mL/Hr) IV Continuous <Continuous>  dextrose 50% Injectable 25 Gram(s) IV Push once  dextrose 50% Injectable 12.5 Gram(s) IV Push once  dextrose 50% Injectable 25 Gram(s) IV Push once  glucagon  Injectable 1 milliGRAM(s) IntraMuscular once  heparin   Injectable 5000 Unit(s) SubCutaneous every 12 hours  insulin lispro (ADMELOG) corrective regimen sliding scale   SubCutaneous every 6 hours  isosorbide   mononitrate ER Tablet (IMDUR) 30 milliGRAM(s) Oral every 24 hours  QUEtiapine 12.5 milliGRAM(s) Oral at bedtime  sodium chloride 0.9%. 1000 milliLiter(s) (100 mL/Hr) IV Continuous <Continuous>    MEDICATIONS  (PRN):  acetaminophen     Tablet .. 650 milliGRAM(s) Oral every 6 hours PRN Temp greater or equal to 38C (100.4F), Mild Pain (1 - 3)  dextrose Oral Gel 15 Gram(s) Oral once PRN Blood Glucose LESS THAN 70 milliGRAM(s)/deciliter  HYDROmorphone  Injectable 0.25 milliGRAM(s) IV Push every 2 hours PRN SOB / RR > 22      Labs:                        8.8    8.24  )-----------( 260      ( 02 Nov 2022 07:06 )             28.4     11-02    143  |  105  |  53<H>  ----------------------------<  94  3.6   |  26  |  3.71<H>    Ca    10.9<H>      02 Nov 2022 07:06  Phos  3.4     11-02  Mg     2.5     11-02    TPro  5.8<L>  /  Alb  1.9<L>  /  TBili  0.3  /  DBili  x   /  AST  20  /  ALT  14  /  AlkPhos  70  11-02        COVID-19 PCR: Negative (30 Oct 2022 13:33)  COVID-19 PCR: Negative (09 Oct 2022 12:39)  COVID-19 PCR: Negative (27 Sep 2022 21:25)      Radiology: Reviewed ******STEPDOWN FROM 7LACHMAN TO 7WOLLMAN******    Patient is a 81y old Male who presents with a chief complaint of Weakness (02 Nov 2022 10:40)    HOSPITAL COURSE: 80yo Cantonese/English speaking male, former smoker noncompliant with PMHx of HTN, HLD, DM2, COPD (uses son's O2), CKD stage 3 presents with altered mental status and lethargy found to be in oliguric renal failure w/ hypercalcemia, swollen RUE and firm, fixed R supraclavicular lymphadenopathy. Likely 2/2 underlying malignancy. Family aware and elected to make patient DNR/DNI. Pursuing palliative care. Concern for RLL infiltrate and UA positive. Patient started on CTX and azithromycin. Renal function unchanged, however Ca improving with fluids and no evidence of fluid overload. UOP responded well to lasix on 11/1. UA grew E faecalis. Abx d/c as low suspicion for UTI and limited concern for CAP. Patient periodically desaturates requiring NRB or positive pressure ventilation. Otherwise tolerates RA/NC. Suspect 2/2 episodic atelectasis On 1hr CPAP Q6H to prevent atelectasis. Currently puree diet. Pt with worsening delirium, started on seroquel 12.5mg qHS for agitation. GOC discussion had in conjunction with palliative care, patient's sons are in agreement to not take drastic measures to prolong life. Tamez discontinued on 11/2. Per palliative discussion with sons, patient will be made full comfort measures.    OVERNIGHT EVENTS/INTERVAL HPI: Patient was placed on CPAP for 1 hour overnight.    REVIEW OF SYSTEMS:  All other review of systems is negative unless indicated above.    OBJECTIVE:  T(C): 37 (11-02-22 @ 09:51), Max: 37.4 (11-02-22 @ 06:00)  HR: 80 (11-02-22 @ 12:40) (73 - 88)  BP: 118/56 (11-02-22 @ 12:40) (115/58 - 144/63)  RR: 17 (11-02-22 @ 12:40) (15 - 20)  SpO2: 100% (11-02-22 @ 12:40) (86% - 100%)  Daily     Daily     Physical Exam:  HEENT: NC/AT; PERRLA, mucus membranes dry  Neck: supple, trachea midline  Cardiovascular: Distant heart sounds, +S1/S2; NO M/R/G. R sided firm, fixed supraclavicular lymphadenopathy.   Respiratory: Mildly diminshed breath sounds bilaterally. No accessory muscle use  Gastrointestinal: soft, NT/ND; +BSx4  Extremities: RUE with pitting edema to shoulder. No palpable cord. No asymmetric erythema/warmth. No LUE swelling/erythema/warmth. B/L LE w/o edema or tenderness to palpation   Vascular: Extremities cool. Palpable pulses in bilateral UE/LE  Neurological: AAOx1 (name); no focal deficits    Medications:  MEDICATIONS  (STANDING):  albuterol/ipratropium for Nebulization 3 milliLiter(s) Nebulizer every 6 hours  amLODIPine   Tablet 10 milliGRAM(s) Oral every 24 hours  atorvastatin 20 milliGRAM(s) Oral at bedtime  dextrose 5%. 1000 milliLiter(s) (100 mL/Hr) IV Continuous <Continuous>  dextrose 5%. 1000 milliLiter(s) (50 mL/Hr) IV Continuous <Continuous>  dextrose 50% Injectable 25 Gram(s) IV Push once  dextrose 50% Injectable 12.5 Gram(s) IV Push once  dextrose 50% Injectable 25 Gram(s) IV Push once  glucagon  Injectable 1 milliGRAM(s) IntraMuscular once  heparin   Injectable 5000 Unit(s) SubCutaneous every 12 hours  insulin lispro (ADMELOG) corrective regimen sliding scale   SubCutaneous every 6 hours  isosorbide   mononitrate ER Tablet (IMDUR) 30 milliGRAM(s) Oral every 24 hours  QUEtiapine 12.5 milliGRAM(s) Oral at bedtime  sodium chloride 0.9%. 1000 milliLiter(s) (100 mL/Hr) IV Continuous <Continuous>    MEDICATIONS  (PRN):  acetaminophen     Tablet .. 650 milliGRAM(s) Oral every 6 hours PRN Temp greater or equal to 38C (100.4F), Mild Pain (1 - 3)  dextrose Oral Gel 15 Gram(s) Oral once PRN Blood Glucose LESS THAN 70 milliGRAM(s)/deciliter  HYDROmorphone  Injectable 0.25 milliGRAM(s) IV Push every 2 hours PRN SOB / RR > 22      Labs:                        8.8    8.24  )-----------( 260      ( 02 Nov 2022 07:06 )             28.4     11-02    143  |  105  |  53<H>  ----------------------------<  94  3.6   |  26  |  3.71<H>    Ca    10.9<H>      02 Nov 2022 07:06  Phos  3.4     11-02  Mg     2.5     11-02    TPro  5.8<L>  /  Alb  1.9<L>  /  TBili  0.3  /  DBili  x   /  AST  20  /  ALT  14  /  AlkPhos  70  11-02        COVID-19 PCR: Negative (30 Oct 2022 13:33)  COVID-19 PCR: Negative (09 Oct 2022 12:39)  COVID-19 PCR: Negative (27 Sep 2022 21:25)      Radiology: Reviewed

## 2022-11-02 NOTE — PROGRESS NOTE ADULT - PROBLEM SELECTOR PLAN 2
DDx for hypercalcemia includes severe dehydration, immobility, multiple myeloma, hypercalcemia 2/2 undiagnosed malignancy, hypothyroidism. S/p 1L bolus of LR. Patient with significant hypovolemia on exam  Corrected Calcium 16.1-->15.8. Total protein 3.3 (L), Protein gap 2.1 (albumin 1.2). TSH 2.36 (wnl)   UA showed few calcium oxalate crystals.   - s/p LR @90cc/hr  - defer further fluids i/s/o oliguric ENZO  - s/p calcitonin 160u q12h, for 2 doses; improving  - Lasix 40mg IVP 11/1  - Defer bisphosphonate i/s/o depressed CrCl  - Given new RUE edema, supraclavicular lymphadenopathy and hypercalcemia, suspect patient may have primary lung malignancy  - f/u: PTH, PTHrP, ionized calcium, albumin, SPEP, UPEP, Vit D DDx for hypercalcemia includes severe dehydration, immobility, multiple myeloma, hypercalcemia 2/2 undiagnosed malignancy, hypothyroidism. S/p 1L bolus of LR. Patient with significant hypovolemia on exam  Corrected Calcium remains >12. Total protein 3.3 (L), Protein gap 2.1 (albumin 1.2). TSH 2.36 (wnl)   UA showed few calcium oxalate crystals. Given new RUE edema, supraclavicular lymphadenopathy and hypercalcemia, suspect patient may have primary lung malignancy    - Will d/c fluids and treatment, transition to comfort care

## 2022-11-02 NOTE — PROGRESS NOTE ADULT - ASSESSMENT
82yo, Cantonese/English speaking, former smoker male PMHx HTN, asthma vs COPD (?) (no hospitalizations/intubations) HLD, DM2, HFpEF, presents with fatigue and weakness for two weeks. Found to have symptomatic hypercalcemia, UTI, and CAP, admitted for further management.

## 2022-11-02 NOTE — PROGRESS NOTE ADULT - PROBLEM SELECTOR PLAN 3
Patient most likely has hypercalcemia 2/2 undiagnosed malignancy, for which family does not want to pursue work up and wants to concentrate on the patients comfort. Patient with new RUE edema, supraclavicular lymphadenopathy and hypercalcemia, suspect patient may have primary lung malignancy. Continue care as per primary team. Patient remains on fluids.

## 2022-11-02 NOTE — PROGRESS NOTE ADULT - PROBLEM SELECTOR PLAN 7
#Asthma/COPD  - on 2L O2, saturating 97%   - patient's son reports possible copd diagnosis, former smoker   - documentation reports asthma hx  - no wheezing on exam   - continue with duoneb prn, on proair prn at home   - wean off O2 #Asthma/COPD  - on 2L O2, saturating 97%   - patient's son reports possible copd diagnosis, former smoker. Documentation reports asthma hx. No wheezing on exam    - continue with duoneb prn, on proair prn at home   - wean off O2

## 2022-11-02 NOTE — PROGRESS NOTE ADULT - PROBLEM SELECTOR PLAN 4
UA: +nitrites, small LE, >10WBC, many bacteria. Difficult to assess urinary symptoms due to mental status. However no fever or leukocytosis to suggest infection.   - Urine cx growing E. faecalis   - d/c CTX

## 2022-11-02 NOTE — PROGRESS NOTE ADULT - PROBLEM SELECTOR PLAN 3
Small R-sided effusion seen on POCUS. Procal 0.12 confounded by CKD. Given lack of focal lungs, no fever and no leukocytosis low suspicion for CAP  - d/c azithromycin   - d/c CTX  - monitor resp status Small R-sided effusion seen on POCUS. Procal 0.12 confounded by CKD. Given lack of focal lungs, no fever and no leukocytosis low suspicion for CAP    - d/c azithromycin  - d/c CTX  - monitor resp status Small R-sided effusion seen on POCUS. Procal 0.12 confounded by CKD. Given lack of focal lungs, no fever and no leukocytosis low suspicion for CAP    - s/p CTX and azithro (10/30-10/31); later dc'd given low suspicion for CAP  - monitor resp status; NC as needed

## 2022-11-02 NOTE — PROGRESS NOTE ADULT - PROBLEM SELECTOR PLAN 4
UA: +nitrites, small LE, >10WBC, many bacteria. Difficult to assess urinary symptoms due to mental status. However no fever or leukocytosis to suggest infection.   - Urine cx growing E. faecalis   - d/c CTX UA: +nitrites, small LE, >10WBC, many bacteria. Difficult to assess urinary symptoms due to mental status. However no fever or leukocytosis to suggest infection.     - Urine cx growing E. faecalis; however, pt asx   - d/c CTX UA: +nitrites, small LE, >10WBC, many bacteria. Difficult to assess urinary symptoms due to mental status. However no fever or leukocytosis to suggest infection.     - Urine cx growing E. faecalis; however, pt asx  - s/p CTX and azithro (10/30-10/31); initially started for RLL infiltrate and concern for CAP, later dc'd given low suspicion for CAP

## 2022-11-03 ENCOUNTER — TRANSCRIPTION ENCOUNTER (OUTPATIENT)
Age: 82
End: 2022-11-03

## 2022-11-03 VITALS
SYSTOLIC BLOOD PRESSURE: 113 MMHG | RESPIRATION RATE: 16 BRPM | DIASTOLIC BLOOD PRESSURE: 59 MMHG | TEMPERATURE: 98 F | OXYGEN SATURATION: 90 % | HEART RATE: 90 BPM

## 2022-11-03 PROCEDURE — 99233 SBSQ HOSP IP/OBS HIGH 50: CPT | Mod: GC

## 2022-11-03 PROCEDURE — 99233 SBSQ HOSP IP/OBS HIGH 50: CPT

## 2022-11-03 RX ORDER — ACETAMINOPHEN 500 MG
2 TABLET ORAL
Qty: 0 | Refills: 0 | DISCHARGE
Start: 2022-11-03

## 2022-11-03 RX ORDER — ROBINUL 0.2 MG/ML
0.2 INJECTION INTRAMUSCULAR; INTRAVENOUS EVERY 6 HOURS
Refills: 0 | Status: DISCONTINUED | OUTPATIENT
Start: 2022-11-03 | End: 2022-11-04

## 2022-11-03 RX ORDER — AMLODIPINE BESYLATE 2.5 MG/1
1 TABLET ORAL
Qty: 0 | Refills: 0 | DISCHARGE

## 2022-11-03 RX ORDER — ALBUTEROL 90 UG/1
3 AEROSOL, METERED ORAL
Qty: 0 | Refills: 0 | DISCHARGE

## 2022-11-03 RX ORDER — IPRATROPIUM/ALBUTEROL SULFATE 18-103MCG
3 AEROSOL WITH ADAPTER (GRAM) INHALATION
Qty: 0 | Refills: 0 | DISCHARGE
Start: 2022-11-03

## 2022-11-03 RX ORDER — ISOSORBIDE MONONITRATE 60 MG/1
1 TABLET, EXTENDED RELEASE ORAL
Qty: 0 | Refills: 0 | DISCHARGE
Start: 2022-11-03

## 2022-11-03 RX ORDER — AMLODIPINE BESYLATE 2.5 MG/1
1 TABLET ORAL
Qty: 0 | Refills: 0 | DISCHARGE
Start: 2022-11-03

## 2022-11-03 RX ORDER — ISOSORBIDE MONONITRATE 60 MG/1
1 TABLET, EXTENDED RELEASE ORAL
Qty: 0 | Refills: 0 | DISCHARGE

## 2022-11-03 RX ADMIN — Medication 3 MILLILITER(S): at 10:28

## 2022-11-03 RX ADMIN — HYDROMORPHONE HYDROCHLORIDE 0.25 MILLIGRAM(S): 2 INJECTION INTRAMUSCULAR; INTRAVENOUS; SUBCUTANEOUS at 08:00

## 2022-11-03 RX ADMIN — Medication 3 MILLILITER(S): at 16:09

## 2022-11-03 RX ADMIN — HYDROMORPHONE HYDROCHLORIDE 0.25 MILLIGRAM(S): 2 INJECTION INTRAMUSCULAR; INTRAVENOUS; SUBCUTANEOUS at 13:56

## 2022-11-03 RX ADMIN — Medication 3 MILLILITER(S): at 23:00

## 2022-11-03 RX ADMIN — HYDROMORPHONE HYDROCHLORIDE 0.25 MILLIGRAM(S): 2 INJECTION INTRAMUSCULAR; INTRAVENOUS; SUBCUTANEOUS at 14:20

## 2022-11-03 RX ADMIN — HYDROMORPHONE HYDROCHLORIDE 0.25 MILLIGRAM(S): 2 INJECTION INTRAMUSCULAR; INTRAVENOUS; SUBCUTANEOUS at 03:00

## 2022-11-03 RX ADMIN — HYDROMORPHONE HYDROCHLORIDE 0.25 MILLIGRAM(S): 2 INJECTION INTRAMUSCULAR; INTRAVENOUS; SUBCUTANEOUS at 02:24

## 2022-11-03 RX ADMIN — QUETIAPINE FUMARATE 12.5 MILLIGRAM(S): 200 TABLET, FILM COATED ORAL at 22:57

## 2022-11-03 NOTE — PROGRESS NOTE ADULT - PROBLEM SELECTOR PLAN 3
Small R-sided effusion seen on POCUS. Procal 0.12 confounded by CKD. Given lack of focal lungs, no fever and no leukocytosis low suspicion for CAP    - s/p CTX and azithro (10/30-10/31); later dc'd given low suspicion for CAP  - monitor resp status; NC as needed DDx for hypercalcemia includes severe dehydration, immobility, multiple myeloma, hypercalcemia 2/2 undiagnosed malignancy, hypothyroidism. S/p 1L bolus of LR. Patient with significant hypovolemia on exam  Corrected Calcium remains >12. Total protein 3.3 (L), Protein gap 2.1 (albumin 1.2). TSH 2.36 (wnl)   UA showed few calcium oxalate crystals. Given new RUE edema, supraclavicular lymphadenopathy and hypercalcemia, suspect patient may have primary lung malignancy. Pt does not want workup for malignancy at this time and prefers comfort measures, per discussion with palliative.     - Will d/c fluids and treatment, transition to comfort care  - s/p calcitonin 160u BID (10/31-11/1), s/p Lasix 40mg IVP 11/1

## 2022-11-03 NOTE — DISCHARGE NOTE PROVIDER - HOSPITAL COURSE
#Discharge: do not delete    Patient is a 80yo Cantonese/English speaking male, former smoker with PMHx of HTN, HLD, DM2, COPD (uses son's O2), CKD stage 3 presented with confusion and lethargy, admitted for oliguric renal failure w/ hypercalcemia.     Inpatient treatment course:   Pt admitted for oliguric renal failure and symptomatic hypercalcemia; renal function unchanged during hospital course however Ca improved with fluids and calcitonin (10/31-11/1).  Pt also with RUE swelling and R supraclavicular lymphadenopathy on presentation, suspicious for malignancy; family aware and does not wish to pursue cancer workup and elected to make patient DNR/DNI. Pt initially started on CTX and azithro (10/30-10/31) for RLL infiltrate and positive UA; later dc'd due to low suspicion for PNA and UTI. Hospital course complicated by periodic oxygen desaturation requiring CPAP, likely 2/2 episodic atelectasis; now well managed on Dilaudid. Pt was started on seroquel 12.5mg qhs for agitation/worsening delirium. Palliative following during hospitla course and per palliative discussion with pt's sons, pt is DNR/DNI, comfort measures. Pt will be discharged to inpatient hospice at [facility].     Problem List/Main Diagnoses:   1. Suspicion for lung cancer  Former smoker, presented with symptomatic hypercalcemia, R supraclavicular lymphadenopathy, and RUE swelling, suspicious for lung malignancy. Family aware and does not want to pursue cancer workup  - Palliative following throughout hospital course, sons are legal surrogates, pt is DNR/DNI, comfort measures, pleasure feeds    2. Acute respiratory failure with hypoxia.  CXR/POCUS w/ small R pleural effusion. Given episodic nature of desaturation suspect intermittent atelectasis, relieved with positive pressure. Patient was started on CPAP for 1hr q6hrs, however patient does not tolerate CPAP well.   - s/p intermittent CPAP; now saturating well on 4L NC     3. Hypercalcemia.  DDx for hypercalcemia includes severe dehydration, immobility, multiple myeloma, hypercalcemia 2/2 undiagnosed malignancy, hypothyroidism. S/p 1L bolus of LR. Patient with significant hypovolemia on exam. Corrected Calcium remains >12. Total protein 3.3 (L), Protein gap 2.1 (albumin 1.2). TSH 2.36 (wnl). UA showed few calcium oxalate crystals. Given new RUE edema, supraclavicular lymphadenopathy and hypercalcemia, suspect patient may have primary lung malignancy. Pt does not want workup for malignancy at this time and prefers comfort measures, per discussion with palliative.   - dc'd fluids; pt on comfort measures   - s/p IV fluids and calcitonin 160u BID (10/31-11/1), s/p Lasix 40mg IVP 11/1.    4. Community acquired pneumonia.  Small R-sided effusion seen on POCUS. Procal 0.12 confounded by CKD. Given lack of focal lungs, no fever and no leukocytosis low suspicion for CAP  - s/p CTX and azithro (10/30-10/31); later dc'd given low suspicion for CAP  - monitor resp status; NC as needed    5. Asymptomatic bacteriuria  UA with +nitrites, small LE, >10WBC, many bacteria. Difficult to assess urinary symptoms due to mental status. However no fever or leukocytosis to suggest infection.   - Urine cx growing E. faecalis; however, pt asx  - s/p CTX and azithro (10/30-10/31); initially started for RLL infiltrate, later dc'd given low suspicion for CAP.    6. Deep vein thrombosis (DVT).  RUE with pitting edema to shoulder, possible DVT. Wells 4.5 (+3 for likely DVT RUE, +1.5 for HR>100). Ddimer 511.   - elevation of RUE    7. ENZO (acute kidney injury).  ENZO on CKD (baseline ~Cr 2.09), on admission CR 3.89, FeUrea 52.1% (intrinsic) FeNa: 0.9% (pre-renal)  I/s/o dehydration 2/2 poor PO intake. UOP responded well to IV lasix. However, will d/c further workup and transition to comfort care.  - Tamez initially placed for strict Is &Os i/s/o oliguric renal failure; dc'd Tamez 11/2 AM  - Bladder scan w/o urinary retention    8.  Asthma with COPD.  patient's son reports possible copd diagnosis, former smoker. Documentation reports asthma hx. No wheezing on exam. On proair at home.   - C/w Duoneb prn  - saturating well on 4L NC    9. Hypertension.  Home meds: amlodipine 10mg and Imdur 30mg   - C/w amlodipine 10mg and Imdur 30mg, as BP tolerates    10. Hyperlipidemia.  - Dc'd lipitor 20mg qhs.    11. Diabetes mellitus  · Pt comfort care, discontinued fingersticks    New medications/therapies: Dilaudid 0.25mg q2h prn for SOB/RR > 22  New lines/hardware: none  Labs to be followed outpatient: none   Exam to be followed outpatient: none    Discharge plan: discharge to inpatient hospice     Physical Exam on Discharge:  GENERAL: elderly male resting in bed in NAD; breathing on 5L NC   NEURO: resting in bed in NAD; opens eyes to voice and nods head intermittently but not replying to questions  HEENT: Normocephalic, atraumatic, no conjunctivitis or scleral icterus  NECK: R-sided firm, fixed supraclavicular lymphadenopathy  CARDIAC: distant heart sounds, +S1/S2, no murmurs/rubs/gallops  PULM: mildly decreased breath sounds b/l; no accessory muscle use; no intercostal retractions; breathing on 5L NC  ABDOMEN: Soft, nontender, nondistended, +bs  SKIN: Warm and dry  EXTREMITIES: RUE with pitting edema to R shoulder, no LUE swelling, no clubbing or cyanosis  Psych: Appropriate affect #Discharge: do not delete    Patient is a 82yo Cantonese/English speaking male, former smoker with PMHx of HTN, HLD, DM2, COPD (uses son's O2), CKD stage 3 presented with confusion and lethargy, admitted for oliguric renal failure w/ hypercalcemia.     Inpatient treatment course:   Pt admitted for oliguric renal failure and symptomatic hypercalcemia; renal function unchanged during hospital course however Ca improved with fluids and calcitonin (10/31-11/1).  Pt also with RUE swelling and R supraclavicular lymphadenopathy on presentation, suspicious for malignancy; family aware and does not wish to pursue cancer workup and elected to make patient DNR/DNI. Pt initially started on CTX and azithro (10/30-10/31) for RLL infiltrate and positive UA; later dc'd due to low suspicion for PNA and UTI. Hospital course complicated by periodic oxygen desaturation requiring CPAP, likely 2/2 episodic atelectasis; now well managed on Dilaudid. Pt was started on seroquel 12.5mg qhs for agitation/worsening delirium. Palliative following during hospital course and per palliative discussion with pt's sons, pt is DNR/DNI, comfort measures. Pt will be discharged to inpatient hospice at [facility].     Problem List/Main Diagnoses:   1. Suspicion for lung cancer  Former smoker, presented with symptomatic hypercalcemia, R supraclavicular lymphadenopathy, and RUE swelling, suspicious for lung malignancy. Family aware and does not want to pursue cancer workup  - Palliative following throughout hospital course, sons are legal surrogates, pt is DNR/DNI, comfort measures, pleasure feeds  - PMD Dr. Cecy Odonnell was notified of pt's condition    2. Acute respiratory failure with hypoxia.  CXR/POCUS w/ small R pleural effusion. Given episodic nature of desaturation suspect intermittent atelectasis, relieved with positive pressure. Patient was started on CPAP for 1hr q6hrs, however patient does not tolerate CPAP well.   - s/p intermittent CPAP; now saturating well on 4L NC     3. Hypercalcemia.  DDx for hypercalcemia includes severe dehydration, immobility, multiple myeloma, hypercalcemia 2/2 undiagnosed malignancy, hypothyroidism. S/p 1L bolus of LR. Patient with significant hypovolemia on exam. Corrected Calcium remains >12. Total protein 3.3 (L), Protein gap 2.1 (albumin 1.2). TSH 2.36 (wnl). UA showed few calcium oxalate crystals. Given new RUE edema, supraclavicular lymphadenopathy and hypercalcemia, suspect patient may have primary lung malignancy. Pt does not want workup for malignancy at this time and prefers comfort measures, per discussion with palliative.   - dc'd fluids; pt on comfort measures   - s/p IV fluids and calcitonin 160u BID (10/31-11/1), s/p Lasix 40mg IVP 11/1.    4. Community acquired pneumonia.  Small R-sided effusion seen on POCUS. Procal 0.12 confounded by CKD. Given lack of focal lungs, no fever and no leukocytosis low suspicion for CAP  - s/p CTX and azithro (10/30-10/31); later dc'd given low suspicion for CAP  - monitor resp status; NC as needed    5. Asymptomatic bacteriuria  UA with +nitrites, small LE, >10WBC, many bacteria. Difficult to assess urinary symptoms due to mental status. However no fever or leukocytosis to suggest infection.   - Urine cx growing E. faecalis; however, pt asx  - s/p CTX and azithro (10/30-10/31); initially started for RLL infiltrate, later dc'd given low suspicion for CAP.    6. Deep vein thrombosis (DVT).  RUE with pitting edema to shoulder, possible DVT. Wells 4.5 (+3 for likely DVT RUE, +1.5 for HR>100). Ddimer 511.   - elevation of RUE    7. ENZO (acute kidney injury).  ENZO on CKD (baseline ~Cr 2.09), on admission CR 3.89, FeUrea 52.1% (intrinsic) FeNa: 0.9% (pre-renal)  I/s/o dehydration 2/2 poor PO intake. UOP responded well to IV lasix. However, will d/c further workup and transition to comfort care.  - Tamez initially placed for strict Is &Os i/s/o oliguric renal failure; dc'd Tamez 11/2 AM  - Bladder scan w/o urinary retention    8.  Asthma with COPD.  patient's son reports possible copd diagnosis, former smoker. Documentation reports asthma hx. No wheezing on exam. On proair at home.   - C/w Duoneb prn  - breathing on 5L     9. Hypertension.  Home meds: amlodipine 10mg and Imdur 30mg   - C/w amlodipine 10mg and Imdur 30mg, as BP tolerates    10. Hyperlipidemia.  - Dc'd lipitor 20mg qhs.    11. Diabetes mellitus  · Pt comfort care, discontinued fingersticks    New medications/therapies: Dilaudid 0.25mg q2h prn for SOB/RR > 22  New lines/hardware: none  Labs to be followed outpatient: none   Exam to be followed outpatient: none    Discharge plan: discharge to inpatient hospice     Physical Exam on Discharge:  GENERAL: elderly male resting in bed in NAD; breathing on 5L NC   NEURO: resting in bed in NAD; opens eyes to voice and nods head intermittently but not replying to questions  HEENT: Normocephalic, atraumatic, no conjunctivitis or scleral icterus  NECK: R-sided firm, fixed supraclavicular lymphadenopathy  CARDIAC: distant heart sounds, +S1/S2, no murmurs/rubs/gallops  PULM: mildly decreased breath sounds b/l; no accessory muscle use; no intercostal retractions; breathing on 5L NC  ABDOMEN: Soft, nontender, nondistended, +bs  SKIN: Warm and dry  EXTREMITIES: RUE with pitting edema to R shoulder, no LUE swelling, no clubbing or cyanosis  Psych: Appropriate affect #Discharge: do not delete    Patient is a 80yo Cantonese/English speaking male, former smoker with PMHx of HTN, HLD, DM2, COPD (uses son's O2), CKD stage 3 presented with confusion and lethargy, admitted for oliguric renal failure w/ hypercalcemia.     Inpatient treatment course:   Pt admitted for oliguric renal failure and symptomatic hypercalcemia; renal function unchanged during hospital course however Ca improved with fluids and calcitonin (10/31-11/1).  Pt also with RUE swelling and R supraclavicular lymphadenopathy on presentation, suspicious for malignancy; family aware and does not wish to pursue cancer workup and elected to make patient DNR/DNI. Pt initially started on CTX and azithro (10/30-10/31) for RLL infiltrate and positive UA; later dc'd due to low suspicion for PNA and UTI. Hospital course complicated by periodic oxygen desaturation requiring CPAP, likely 2/2 episodic atelectasis; now well managed on Dilaudid. Pt was started on seroquel 12.5mg qhs for agitation/worsening delirium. Palliative following during hospital course and per palliative discussion with pt's sons, pt is DNR/DNI, comfort measures. Pt will be discharged to inpatient hospice at [facility].     Problem List/Main Diagnoses:   1. Suspicion for lung cancer  Former smoker, presented with symptomatic hypercalcemia, R supraclavicular lymphadenopathy, and RUE swelling, suspicious for lung malignancy. Family aware and does not want to pursue cancer workup  - Palliative following throughout hospital course, sons are legal surrogates, pt is DNR/DNI, comfort measures, pleasure feeds  - PMD Dr. Cecy Odonnell was notified of pt's condition    2. Acute respiratory failure with hypoxia.  CXR/POCUS w/ small R pleural effusion. Given episodic nature of desaturation suspect intermittent atelectasis, relieved with positive pressure. Patient was started on CPAP for 1hr q6hrs, however patient does not tolerate CPAP well.   - s/p intermittent CPAP; now saturating well on 4L NC     3. Hypercalcemia.  DDx for hypercalcemia includes severe dehydration, immobility, multiple myeloma, hypercalcemia 2/2 undiagnosed malignancy, hypothyroidism. S/p 1L bolus of LR. Patient with significant hypovolemia on exam. Corrected Calcium remains >12. Total protein 3.3 (L), Protein gap 2.1 (albumin 1.2). TSH 2.36 (wnl). UA showed few calcium oxalate crystals. Given new RUE edema, supraclavicular lymphadenopathy and hypercalcemia, suspect patient may have primary lung malignancy. Pt does not want workup for malignancy at this time and prefers comfort measures, per discussion with palliative.   - dc'd fluids; pt on comfort measures   - s/p IV fluids and calcitonin 160u BID (10/31-11/1), s/p Lasix 40mg IVP 11/1.    4. Community acquired pneumonia.  Small R-sided effusion seen on POCUS. Procal 0.12 confounded by CKD. Given lack of focal lungs, no fever and no leukocytosis low suspicion for CAP  - s/p CTX and azithro (10/30-10/31); later dc'd given low suspicion for CAP  - monitor resp status; NC as needed    5. Asymptomatic bacteriuria  UA with +nitrites, small LE, >10WBC, many bacteria. Difficult to assess urinary symptoms due to mental status. However no fever or leukocytosis to suggest infection.   - Urine cx growing E. faecalis; however, pt asx  - s/p CTX and azithro (10/30-10/31); initially started for RLL infiltrate, later dc'd given low suspicion for CAP.    6. Deep vein thrombosis (DVT).  RUE with pitting edema to shoulder, possible DVT. Wells 4.5 (+3 for likely DVT RUE, +1.5 for HR>100). Ddimer 511.   - elevation of RUE    7. ENZO (acute kidney injury).  ENZO on CKD (baseline ~Cr 2.09), on admission CR 3.89, FeUrea 52.1% (intrinsic) FeNa: 0.9% (pre-renal)  I/s/o dehydration 2/2 poor PO intake. UOP responded well to IV lasix. However, will d/c further workup and transition to comfort care.  - Tamez initially placed for strict Is &Os i/s/o oliguric renal failure; dc'd Tamez 11/2 AM  - Bladder scan w/o urinary retention    8.  Asthma with COPD.  patient's son reports possible copd diagnosis, former smoker. Documentation reports asthma hx. No wheezing on exam. On proair at home.   - C/w Duoneb prn  - breathing on 5L     9. Hypertension.  Home meds: amlodipine 10mg and Imdur 30mg   - C/w amlodipine 10mg and Imdur 30mg, as BP tolerates    10. Hyperlipidemia.  - Dc'd lipitor 20mg qhs.    11. Diabetes mellitus  - Pt comfort care, discontinued fingersticks    New medications/therapies: Dilaudid 0.25mg q2h prn for SOB/RR > 22  New lines/hardware: none  Labs to be followed outpatient: none   Exam to be followed outpatient: none    Discharge plan: discharge to inpatient hospice     Physical Exam on Discharge:  GENERAL: elderly male resting in bed in NAD; breathing on 6L NC   NEURO: resting in bed in NAD; opens eyes to voice and nods head intermittently but not replying to questions  HEENT: Normocephalic, atraumatic, no conjunctivitis or scleral icterus  NECK: R-sided firm, fixed supraclavicular lymphadenopathy  CARDIAC: distant heart sounds, +S1/S2, no murmurs/rubs/gallops  PULM: mildly decreased breath sounds b/l; no accessory muscle use; no intercostal retractions; breathing on 6L NC  ABDOMEN: Soft, nontender, nondistended, +bs  SKIN: Warm and dry  EXTREMITIES: RUE with pitting edema to R shoulder, no LUE swelling, no clubbing or cyanosis  Psych: Appropriate affect #Discharge: do not delete    Patient is a 80yo Cantonese/English speaking male, former smoker with PMHx of HTN, HLD, DM2, COPD (uses son's O2), CKD stage 3 presented with confusion and lethargy, admitted for oliguric renal failure w/ hypercalcemia.     Inpatient treatment course:   Pt admitted for oliguric renal failure and symptomatic hypercalcemia; renal function unchanged during hospital course however Ca improved with fluids and calcitonin (10/31-11/1).  Pt also with RUE swelling and R supraclavicular lymphadenopathy on presentation, suspicious for malignancy; family aware and does not wish to pursue cancer workup and elected to make patient DNR/DNI. Pt initially started on CTX and azithro (10/30-10/31) for RLL infiltrate and positive UA; later dc'd due to low suspicion for PNA and UTI. Hospital course complicated by periodic oxygen desaturation requiring CPAP, likely 2/2 episodic atelectasis; now well managed on 6L NC and Dilaudid. Pt was started on seroquel 12.5mg qhs for agitation/worsening delirium. Palliative following during hospital course and per palliative discussion with pt's sons, pt is DNR/DNI, comfort measures. Pt will be discharged to inpatient hospice at [facility].     Problem List/Main Diagnoses:   1. Suspicion for lung cancer  Former smoker, presented with symptomatic hypercalcemia, R supraclavicular lymphadenopathy, and RUE swelling, suspicious for lung malignancy. Family aware and does not want to pursue cancer workup  - Palliative following throughout hospital course, sons are legal surrogates, pt is DNR/DNI, comfort measures, pleasure feeds  - PMD Dr. Cecy Odonnell was notified of pt's condition    2. Acute respiratory failure with hypoxia.  CXR/POCUS w/ small R pleural effusion. Given episodic nature of desaturation suspect intermittent atelectasis, relieved with positive pressure. Patient was started on CPAP for 1hr q6hrs, however patient does not tolerate CPAP well.   - s/p intermittent CPAP; now saturating well on 6L NC     3. Hypercalcemia.  DDx for hypercalcemia includes severe dehydration, immobility, multiple myeloma, hypercalcemia 2/2 undiagnosed malignancy, hypothyroidism. S/p 1L bolus of LR. Patient with significant hypovolemia on exam. Corrected Calcium remains >12. Total protein 3.3 (L), Protein gap 2.1 (albumin 1.2). TSH 2.36 (wnl). UA showed few calcium oxalate crystals. Given new RUE edema, supraclavicular lymphadenopathy and hypercalcemia, suspect patient may have primary lung malignancy. Pt does not want workup for malignancy at this time and prefers comfort measures, per discussion with palliative.   - dc'd fluids; pt on comfort measures   - s/p IV fluids and calcitonin 160u BID (10/31-11/1), s/p Lasix 40mg IVP 11/1.    4. Community acquired pneumonia.  Small R-sided effusion seen on POCUS. Procal 0.12 confounded by CKD. Given lack of focal lungs, no fever and no leukocytosis low suspicion for CAP  - s/p CTX and azithro (10/30-10/31); later dc'd given low suspicion for CAP  - monitor resp status; NC as needed    5. Asymptomatic bacteriuria  UA with +nitrites, small LE, >10WBC, many bacteria. Difficult to assess urinary symptoms due to mental status. However no fever or leukocytosis to suggest infection.   - Urine cx growing E. faecalis; however, pt asx  - s/p CTX and azithro (10/30-10/31); initially started for RLL infiltrate, later dc'd given low suspicion for CAP.    6. Deep vein thrombosis (DVT).  RUE with pitting edema to shoulder, possible DVT. Wells 4.5 (+3 for likely DVT RUE, +1.5 for HR>100). Ddimer 511.   - elevation of RUE    7. ENZO (acute kidney injury).  ENZO on CKD (baseline ~Cr 2.09), on admission CR 3.89, FeUrea 52.1% (intrinsic) FeNa: 0.9% (pre-renal)  I/s/o dehydration 2/2 poor PO intake. UOP responded well to IV lasix. However, will d/c further workup and transition to comfort care.  - Tamez initially placed for strict Is &Os i/s/o oliguric renal failure; dc'd Tamez 11/2 AM  - Bladder scan w/o urinary retention    8.  Asthma with COPD.  patient's son reports possible copd diagnosis, former smoker. Documentation reports asthma hx. No wheezing on exam. On proair at home.   - C/w Duoneb prn  - breathing on 6L     9. Hypertension.  Home meds: amlodipine 10mg and Imdur 30mg   - C/w amlodipine 10mg and Imdur 30mg, as BP tolerates    10. Hyperlipidemia.  - Dc'd lipitor 20mg qhs.    11. Diabetes mellitus  - Pt comfort care, discontinued fingersticks    New medications/therapies: Dilaudid 0.25mg q2h prn for SOB/RR > 22  New lines/hardware: none  Labs to be followed outpatient: none   Exam to be followed outpatient: none    Discharge plan: discharge to inpatient hospice     Physical Exam on Discharge:  GENERAL: elderly male resting in bed in NAD; breathing on 6L NC   NEURO: resting in bed in NAD; opens eyes to voice and nods head intermittently but not replying to questions  HEENT: Normocephalic, atraumatic, no conjunctivitis or scleral icterus  NECK: R-sided firm, fixed supraclavicular lymphadenopathy  CARDIAC: distant heart sounds, +S1/S2, no murmurs/rubs/gallops  PULM: mildly decreased breath sounds b/l; no accessory muscle use; no intercostal retractions; breathing on 6L NC  ABDOMEN: Soft, nontender, nondistended, +bs  SKIN: Warm and dry  EXTREMITIES: RUE with pitting edema to R shoulder, no LUE swelling, no clubbing or cyanosis  Psych: Appropriate affect

## 2022-11-03 NOTE — PROGRESS NOTE ADULT - PROBLEM SELECTOR PLAN 4
UA: +nitrites, small LE, >10WBC, many bacteria. Difficult to assess urinary symptoms due to mental status. However no fever or leukocytosis to suggest infection.     - Urine cx growing E. faecalis; however, pt asx  - s/p CTX and azithro (10/30-10/31); initially started for RLL infiltrate and concern for CAP, later dc'd given low suspicion for CAP Small R-sided effusion seen on POCUS. Procal 0.12 confounded by CKD. Given lack of focal lungs, no fever and no leukocytosis low suspicion for CAP    - s/p CTX and azithro (10/30-10/31); later dc'd given low suspicion for CAP  - monitor resp status; NC as needed

## 2022-11-03 NOTE — DISCHARGE NOTE NURSING/CASE MANAGEMENT/SOCIAL WORK - PATIENT PORTAL LINK FT
You can access the FollowMyHealth Patient Portal offered by [facility] by registering at the following website: [email or web address]. By joining [employer]’s FollowMyHealth portal, you will also be able to view your health information using other applications (apps) compatible with our system.

## 2022-11-03 NOTE — PROGRESS NOTE ADULT - PROBLEM SELECTOR PLAN 8
- on amlodipine 10mg at home, continue as BP tolerates  - on imdur 30mg extended release, continue as BP tolerates #Asthma/COPD  - on 2L O2, saturating 97%   - patient's son reports possible copd diagnosis, former smoker. Documentation reports asthma hx. No wheezing on exam    - continue with duoneb prn, on proair prn at home   - wean off O2

## 2022-11-03 NOTE — PROGRESS NOTE ADULT - PROBLEM SELECTOR PLAN 5
RUE with pitting edema to shoulder, possible DVT. Wells 4.5 (+3 for likely DVT RUE, +1.5 for HR>100). Ddimer 511    - pt now comfort measures, dc'd RUE Dopplers UA: +nitrites, small LE, >10WBC, many bacteria. Difficult to assess urinary symptoms due to mental status. However no fever or leukocytosis to suggest infection.     - Urine cx growing E. faecalis; however, pt asx  - s/p CTX and azithro (10/30-10/31); initially started for RLL infiltrate and concern for CAP, later dc'd given low suspicion for CAP

## 2022-11-03 NOTE — DISCHARGE NOTE NURSING/CASE MANAGEMENT/SOCIAL WORK - NSDCPEFALRISK_GEN_ALL_CORE
For information on Fall & Injury Prevention, visit: [email or web address] OR  [email or web address] OR  https://www.cdc.gov/steadi/patient.html

## 2022-11-03 NOTE — PROGRESS NOTE ADULT - SUBJECTIVE AND OBJECTIVE BOX
** INCOMPLETE **    OVERNIGHT EVENTS:     SUBJECTIVE:    VITAL SIGNS:  Vital Signs Last 24 Hrs  T(C): 36.8 (03 Nov 2022 06:34), Max: 37.3 (02 Nov 2022 16:22)  T(F): 98.2 (03 Nov 2022 06:34), Max: 99.2 (02 Nov 2022 16:22)  HR: 86 (03 Nov 2022 06:34) (80 - 88)  BP: 117/67 (03 Nov 2022 06:34) (115/54 - 118/56)  BP(mean): 80 (02 Nov 2022 12:40) (80 - 82)  RR: 17 (03 Nov 2022 06:34) (15 - 17)  SpO2: 94% (03 Nov 2022 06:34) (89% - 100%)    Parameters below as of 03 Nov 2022 06:34  Patient On (Oxygen Delivery Method): nasal cannula        PHYSICAL EXAM:  General: NAD; speaking in full sentences  HEENT: NC/AT; PERRL; EOMI; MMM  Neck: supple; no JVD  Cardiac: RRR; +S1/S2  Pulm: CTA B/L; no W/R/R  GI: soft, NT/ND, +BS  Extremities:  no edema, clubbing or cyanosis  Vasc: 2+ radial, DP pulses B/L  Neuro: AAOx3; no focal deficits    MEDICATIONS:  MEDICATIONS  (STANDING):  albuterol/ipratropium for Nebulization 3 milliLiter(s) Nebulizer every 6 hours  amLODIPine   Tablet 10 milliGRAM(s) Oral every 24 hours  isosorbide   mononitrate ER Tablet (IMDUR) 30 milliGRAM(s) Oral every 24 hours  QUEtiapine 12.5 milliGRAM(s) Oral at bedtime    MEDICATIONS  (PRN):  acetaminophen     Tablet .. 650 milliGRAM(s) Oral every 6 hours PRN Temp greater or equal to 38C (100.4F), Mild Pain (1 - 3)  glycopyrrolate 1 milliGRAM(s) Oral every 6 hours PRN secretions  HYDROmorphone  Injectable 0.25 milliGRAM(s) IV Push every 2 hours PRN SOB / RR > 22      ALLERGIES:  Allergies    No Known Allergies    Intolerances        LABS:                        8.8    8.24  )-----------( 260      ( 02 Nov 2022 07:06 )             28.4     11-02    143  |  105  |  53<H>  ----------------------------<  94  3.6   |  26  |  3.71<H>    Ca    10.9<H>      02 Nov 2022 07:06  Phos  3.4     11-02  Mg     2.5     11-02    TPro  5.8<L>  /  Alb  1.9<L>  /  TBili  0.3  /  DBili  x   /  AST  20  /  ALT  14  /  AlkPhos  70  11-02        RADIOLOGY & ADDITIONAL TESTS: Reviewed. OVERNIGHT EVENTS:   No acute events overnight.    SUBJECTIVE:  Pt seen and examined at bedside. Resting comfortably in bed on 6L NC. Opens eyes to voice and touch and nods head intermittent but not responding to questions.     VITAL SIGNS:  Vital Signs Last 24 Hrs  T(C): 36.8 (03 Nov 2022 06:34), Max: 37.3 (02 Nov 2022 16:22)  T(F): 98.2 (03 Nov 2022 06:34), Max: 99.2 (02 Nov 2022 16:22)  HR: 86 (03 Nov 2022 06:34) (80 - 88)  BP: 117/67 (03 Nov 2022 06:34) (115/54 - 118/56)  BP(mean): 80 (02 Nov 2022 12:40) (80 - 82)  RR: 17 (03 Nov 2022 06:34) (15 - 17)  SpO2: 94% (03 Nov 2022 06:34) (89% - 100%)    Parameters below as of 03 Nov 2022 06:34  Patient On (Oxygen Delivery Method): nasal cannula        PHYSICAL EXAM:  General: elderly thin male resting in bed in NAD; breathing on 6L NC  HEENT: NC/AT; PERRL; EOMI; MMM  Neck: R-sided firm, fixed supraclavicular lymphadenopathy   Cardiac: distant heart sounds; +S1/S2  Pulm: mildly decreased breath sounds b/l; no accessory muscle use; no intercostal retractions; saturating 97% on 6L NC  GI: soft, NT/ND, +BS  Extremities: RUE with pitting edema to R shoulder, clubbing or cyanosis  Vasc: 2+ radial pulses B/L  Neuro: resting in bed in NAD; opens eyes to voice and nods head intermittently but not replying to questions    MEDICATIONS:  MEDICATIONS  (STANDING):  albuterol/ipratropium for Nebulization 3 milliLiter(s) Nebulizer every 6 hours  amLODIPine   Tablet 10 milliGRAM(s) Oral every 24 hours  isosorbide   mononitrate ER Tablet (IMDUR) 30 milliGRAM(s) Oral every 24 hours  QUEtiapine 12.5 milliGRAM(s) Oral at bedtime    MEDICATIONS  (PRN):  acetaminophen     Tablet .. 650 milliGRAM(s) Oral every 6 hours PRN Temp greater or equal to 38C (100.4F), Mild Pain (1 - 3)  glycopyrrolate 1 milliGRAM(s) Oral every 6 hours PRN secretions  HYDROmorphone  Injectable 0.25 milliGRAM(s) IV Push every 2 hours PRN SOB / RR > 22      ALLERGIES:  Allergies    No Known Allergies    Intolerances        LABS:                        8.8    8.24  )-----------( 260      ( 02 Nov 2022 07:06 )             28.4     11-02    143  |  105  |  53<H>  ----------------------------<  94  3.6   |  26  |  3.71<H>    Ca    10.9<H>      02 Nov 2022 07:06  Phos  3.4     11-02  Mg     2.5     11-02    TPro  5.8<L>  /  Alb  1.9<L>  /  TBili  0.3  /  DBili  x   /  AST  20  /  ALT  14  /  AlkPhos  70  11-02        RADIOLOGY & ADDITIONAL TESTS: Reviewed. OVERNIGHT EVENTS:   No acute events overnight.    SUBJECTIVE:  Pt seen and examined at bedside. Resting comfortably in bed on 6L NC. Opens eyes to voice and touch and nods head intermittent but not responding to questions.     VITAL SIGNS:  Vital Signs Last 24 Hrs  T(C): 36.8 (03 Nov 2022 06:34), Max: 37.3 (02 Nov 2022 16:22)  T(F): 98.2 (03 Nov 2022 06:34), Max: 99.2 (02 Nov 2022 16:22)  HR: 86 (03 Nov 2022 06:34) (80 - 88)  BP: 117/67 (03 Nov 2022 06:34) (115/54 - 118/56)  BP(mean): 80 (02 Nov 2022 12:40) (80 - 82)  RR: 17 (03 Nov 2022 06:34) (15 - 17)  SpO2: 94% (03 Nov 2022 06:34) (89% - 100%)    Parameters below as of 03 Nov 2022 06:34  Patient On (Oxygen Delivery Method): nasal cannula        PHYSICAL EXAM:  General: elderly thin male resting in bed in NAD; breathing comfortably on 6L NC  HEENT: NC/AT; PERRL; EOMI; MMM  Neck: R-sided firm, fixed supraclavicular lymphadenopathy   Cardiac: distant heart sounds; +S1/S2  Pulm: mildly decreased breath sounds b/l; no accessory muscle use; no intercostal retractions; saturating 97% on 6L NC  GI: soft, NT/ND, +BS  Extremities: RUE with pitting edema to R shoulder, clubbing or cyanosis  Vasc: 2+ radial pulses B/L  Neuro: resting in bed in NAD; opens eyes to voice and nods head intermittently but not replying to questions    MEDICATIONS:  MEDICATIONS  (STANDING):  albuterol/ipratropium for Nebulization 3 milliLiter(s) Nebulizer every 6 hours  amLODIPine   Tablet 10 milliGRAM(s) Oral every 24 hours  isosorbide   mononitrate ER Tablet (IMDUR) 30 milliGRAM(s) Oral every 24 hours  QUEtiapine 12.5 milliGRAM(s) Oral at bedtime    MEDICATIONS  (PRN):  acetaminophen     Tablet .. 650 milliGRAM(s) Oral every 6 hours PRN Temp greater or equal to 38C (100.4F), Mild Pain (1 - 3)  glycopyrrolate 1 milliGRAM(s) Oral every 6 hours PRN secretions  HYDROmorphone  Injectable 0.25 milliGRAM(s) IV Push every 2 hours PRN SOB / RR > 22      ALLERGIES:  Allergies    No Known Allergies    Intolerances        LABS:                        8.8    8.24  )-----------( 260      ( 02 Nov 2022 07:06 )             28.4     11-02    143  |  105  |  53<H>  ----------------------------<  94  3.6   |  26  |  3.71<H>    Ca    10.9<H>      02 Nov 2022 07:06  Phos  3.4     11-02  Mg     2.5     11-02    TPro  5.8<L>  /  Alb  1.9<L>  /  TBili  0.3  /  DBili  x   /  AST  20  /  ALT  14  /  AlkPhos  70  11-02        RADIOLOGY & ADDITIONAL TESTS: Reviewed.

## 2022-11-03 NOTE — PROGRESS NOTE ADULT - PROBLEM SELECTOR PLAN 9
- dc'd lipitor 20mg qhs - on amlodipine 10mg at home, continue as BP tolerates  - on imdur 30mg extended release, continue as BP tolerates

## 2022-11-03 NOTE — PROGRESS NOTE ADULT - PROBLEM SELECTOR PLAN 5
Patient remains a DNR/DNI. Will continue to manage patient symptoms. Per discussion with sons yesterday, sons stated that the patient understands that he is dying. Family in agreement for symptomn directed care and want to continue medications for comfort.  - Confucianist/Spiritual practice: unknown   - Coping: [ ] well [ ] with difficulty [ ] poor coping  [x] unable to obtain  - Support system: [ x] strong [ ] adequate [ ] inadequate  - All questions answered, emotional support provided  -  primary team   - Please contact Palliative Medicine 24/7 at [phone] for any acute symptoms or further questions  - Will continue to follow with you

## 2022-11-03 NOTE — PROGRESS NOTE ADULT - PROBLEM SELECTOR PLAN 1
Patient no longer requiring now requiring BiPaP mask. Is well managed on Dilaudid 0.25mg q2h IV PRN. Has used 4 PRNs in 24 hours.

## 2022-11-03 NOTE — DISCHARGE NOTE PROVIDER - NSDCCPCAREPLAN_GEN_ALL_CORE_FT
PRINCIPAL DISCHARGE DIAGNOSIS  Diagnosis: Hypercalcemia  Assessment and Plan of Treatment:       SECONDARY DISCHARGE DIAGNOSES  Diagnosis: Acute renal failure (ARF)  Assessment and Plan of Treatment:     Diagnosis: Elevated troponin  Assessment and Plan of Treatment:     Diagnosis: Dyspnea  Assessment and Plan of Treatment:     Diagnosis: Acute respiratory failure with hypoxia  Assessment and Plan of Treatment:      PRINCIPAL DISCHARGE DIAGNOSIS  Diagnosis: Hypercalcemia  Assessment and Plan of Treatment: You presented to the hospital with weakness and confusion, and were found to have elevated calcium levels. In combination with your enlarged right lymph node, right arm swelling, this is suggestive of cancer which was discussed with your family. However, your family decided not to pursue cancer workup and to focus on making you comfortably. You will be discharged to inpatient hospice at [facility].   Deciding to use hospice care can make the last months of life much easier. People who choose hospice care can get medicines and helpers who can make the last stages of illness as comfortable as possible.      SECONDARY DISCHARGE DIAGNOSES  Diagnosis: Acute renal failure (ARF)  Assessment and Plan of Treatment:     Diagnosis: Elevated troponin  Assessment and Plan of Treatment:     Diagnosis: Dyspnea  Assessment and Plan of Treatment:     Diagnosis: Acute respiratory failure with hypoxia  Assessment and Plan of Treatment:      PRINCIPAL DISCHARGE DIAGNOSIS  Diagnosis: Cancer  Assessment and Plan of Treatment: You presented to the hospital with weakness and confusion, and were found to have elevated calcium levels. In combination with your enlarged right lymph node, right arm swelling, this is suggestive of cancer, and these findings which was discussed with your family. Your family decided not to pursue cancer workup and to focus on making you comfortable. You will be discharged to inpatient hospice at [facility].   Deciding to use hospice care can make the last months of life much easier. People who choose hospice care can get medicines and helpers who can make the last stages of illness as comfortable as possible.      SECONDARY DISCHARGE DIAGNOSES  Diagnosis: Hypercalcemia  Assessment and Plan of Treatment: Hypercalcemia is a condition in which the calcium level in your blood is above normal. Too much calcium in your blood can weaken your bones, create kidney stones, and interfere with how your heart and brain work.  Signs and symptoms of hypercalcemia range from nonexistent to severe. Treatment depends on the cause.   Symptoms  You might not have signs or symptoms if your hypercalcemia is mild. More-severe cases produce signs and symptoms related to the parts of your body affected by the high calcium levels in your blood. Examples include:  - Kidneys. Excess calcium makes your kidneys work harder to filter it. This can cause excessive thirst and frequent urination.  - Digestive system. Hypercalcemia can cause stomach upset, nausea, vomiting and constipation.  - Bones and muscles. In most cases, the excess calcium in your blood was leached from your bones, which weakens them. This can cause bone pain and muscle weakness.  - Brain. Hypercalcemia can interfere with how your brain works, resulting in confusion, lethargy and fatigue. It can also cause depression.  - Heart. Rarely, severe hypercalcemia can interfere with your heart function, causing palpitations and fainting, indications of cardiac arrhythmia, and other heart problems.  In your scenario, we think your hypercalcemia is from  Your family decided not to pursue further workup, and you will be discharged to inpatient hospice at [facility].    Diagnosis: Acute renal failure (ARF)  Assessment and Plan of Treatment: Acute kidney injury is when the kidney function worsens. Normally, the kidneys filter the blood and remove waste and excess salt and water. The word "acute" means sudden. Another term for acute kidney injury is "acute kidney failure." Acute kidney injury can have different causes. It can happen when there is less blood flow than usual to the kidneys, when the kidneys get damaged (from infections, cancer, certain medications, or autoimmune conditions), or when the path for urine to leave the body is blocked (a common example is prostate enlargement in men). Your kidney function remained unchanged while you were in the hospital. You had frequent bladder scans to ensure you were not retaining urine.    Diagnosis: Elevated troponin  Assessment and Plan of Treatment:     Diagnosis: Dyspnea  Assessment and Plan of Treatment:     Diagnosis: Acute respiratory failure with hypoxia  Assessment and Plan of Treatment:      PRINCIPAL DISCHARGE DIAGNOSIS  Diagnosis: Cancer  Assessment and Plan of Treatment: You presented to the hospital with weakness and confusion, and were found to have elevated calcium levels. In combination with your enlarged right lymph node, right arm swelling, this is suggestive of cancer, and these findings which was discussed with your family. Your family decided not to pursue cancer workup and to focus on making you comfortable. You will be discharged to inpatient hospice at [facility].   Deciding to use hospice care can make the last months of life much easier. People who choose hospice care can get medicines and helpers who can make the last stages of illness as comfortable as possible.      SECONDARY DISCHARGE DIAGNOSES  Diagnosis: Hypercalcemia  Assessment and Plan of Treatment: Hypercalcemia is a condition in which the calcium level in your blood is above normal. Too much calcium in your blood can weaken your bones, create kidney stones, and interfere with how your heart and brain work.  Signs and symptoms of hypercalcemia range from nonexistent to severe. Treatment depends on the cause.   Symptoms  You might not have signs or symptoms if your hypercalcemia is mild. More-severe cases produce signs and symptoms related to the parts of your body affected by the high calcium levels in your blood. Examples include:  - Kidneys. Excess calcium makes your kidneys work harder to filter it. This can cause excessive thirst and frequent urination.  - Digestive system. Hypercalcemia can cause stomach upset, nausea, vomiting and constipation.  - Bones and muscles. In most cases, the excess calcium in your blood was leached from your bones, which weakens them. This can cause bone pain and muscle weakness.  - Brain. Hypercalcemia can interfere with how your brain works, resulting in confusion, lethargy and fatigue. It can also cause depression.  - Heart. Rarely, severe hypercalcemia can interfere with your heart function, causing palpitations and fainting, indications of cardiac arrhythmia, and other heart problems.  ____________________  In your scenario, we think your hypercalcemia is from  Your family decided not to pursue further workup, and you will be discharged to inpatient hospice at [facility].    Diagnosis: Acute renal failure (ARF)  Assessment and Plan of Treatment: Acute kidney injury is when the kidney function worsens. Normally, the kidneys filter the blood and remove waste and excess salt and water. The word "acute" means sudden. Another term for acute kidney injury is "acute kidney failure." Acute kidney injury can have different causes. It can happen when there is less blood flow than usual to the kidneys, when the kidneys get damaged (from infections, cancer, certain medications, or autoimmune conditions), or when the path for urine to leave the body is blocked (a common example is prostate enlargement in men). Your kidney function remained unchanged while you were in the hospital. You had frequent bladder scans to ensure you were not retaining urine.    Diagnosis: Elevated troponin  Assessment and Plan of Treatment:     Diagnosis: Dyspnea  Assessment and Plan of Treatment:     Diagnosis: Acute respiratory failure with hypoxia  Assessment and Plan of Treatment:      PRINCIPAL DISCHARGE DIAGNOSIS  Diagnosis: Cancer  Assessment and Plan of Treatment: You presented to the hospital with weakness and confusion, and were found to have elevated calcium levels. In combination with your enlarged right lymph node, right arm swelling, this is suggestive of cancer, and these findings which was discussed with your family. Your family decided not to pursue cancer workup and to focus on making you comfortable. You will be discharged to inpatient hospice at [facility].   Deciding to use hospice care can make the last months of life much easier. People who choose hospice care can get medicines and helpers who can make the last stages of illness as comfortable as possible.      SECONDARY DISCHARGE DIAGNOSES  Diagnosis: Hypercalcemia  Assessment and Plan of Treatment: Hypercalcemia is a condition in which the calcium level in your blood is above normal. Too much calcium in your blood can weaken your bones, create kidney stones, and interfere with how your heart and brain work.  Hypercalcemia can cause the following symptoms:   - Kidneys. Excess calcium makes your kidneys work harder to filter it. This can cause excessive thirst and frequent urination.  - Digestive system. Hypercalcemia can cause stomach upset, nausea, vomiting and constipation.  - Bones and muscles. In most cases, the excess calcium in your blood was leached from your bones, which weakens them. This can cause bone pain and muscle weakness.  - Brain. Hypercalcemia can interfere with how your brain works, resulting in confusion, lethargy and fatigue. It can also cause depression.  - Heart. Rarely, severe hypercalcemia can interfere with your heart function, causing palpitations and fainting, indications of cardiac arrhythmia, and other heart problems.  ____________________  In your scenario, we think your hypercalcemia is from  Your family decided not to pursue further workup, and you will be discharged to inpatient hospice at [facility].    Diagnosis: Acute renal failure (ARF)  Assessment and Plan of Treatment: Acute kidney injury is when the kidney function worsens. Normally, the kidneys filter the blood and remove waste and excess salt and water. The word "acute" means sudden. Another term for acute kidney injury is "acute kidney failure." Acute kidney injury can have different causes. It can happen when there is less blood flow than usual to the kidneys, when the kidneys get damaged (from infections, cancer, certain medications, or autoimmune conditions), or when the path for urine to leave the body is blocked (a common example is prostate enlargement in men). Your kidney function remained unchanged while you were in the hospital. You had frequent bladder scans to ensure you were not retaining urine.    Diagnosis: Elevated troponin  Assessment and Plan of Treatment:     Diagnosis: Dyspnea  Assessment and Plan of Treatment:     Diagnosis: Acute respiratory failure with hypoxia  Assessment and Plan of Treatment:

## 2022-11-03 NOTE — PROGRESS NOTE ADULT - PROBLEM SELECTOR PLAN 6
ENZO on CKD (baseline ~Cr 2.09), on admission CR 3.89, FeUrea 52.1% (intrinsic) FeNa: 0.9% (pre-renal)  I/s/o dehydration 2/2 poor PO intake. UOP responded well to IV lasix. However, will d/c further workup and transition to comfort care.    - Tamez initially placed for strict Is &Os i/s/o oliguric renal failure  - dc'd Joi today (11/2 AM) RUE with pitting edema to shoulder, possible DVT. Wells 4.5 (+3 for likely DVT RUE, +1.5 for HR>100). Ddimer 511    - pt now comfort measures, dc'd RUE Dopplers  - elevation of RUE

## 2022-11-03 NOTE — PROGRESS NOTE ADULT - ATTENDING COMMENTS
Progressive failure to thrive with worsening hypercalcemia, renal failure with likely ATN, supraclavicular mass, UTI  physical as above  careful hydration but with oliguria options short of dialysis are limited  agree with palliative consult  follow renal function  doppler RUE  continue imdur/amlodipine  from ID perspective a UTI is more likely than a pna  rest as above  decision making of high complexity
COPD, DM2, HFpEF failure to thrive with apparent cancer, hypercalcemia and ATN  physical as above  tolerating NC  renal function is stable  calcium decreasing  palliative care input appreciated and agree with emphasis on comfort care from here
pt open eyes, on oxygen nasal canula, not able to verbalize, no family member around.  record reviewed.  thin, open eyes, on oxygen nasal canula, RRR, good air entry, edema on right upper arm, no edema on bilateral lower ext, bs present, no lakhani.     82yo Cantonese/English speaking male, former smoker with PMHx of HTN, HLD, DM2, COPD (uses son's O2), CKD stage 3 presented with AMS and lethargy, admitted for oliguric renal failure w/ hypercalcemia. Pt with swollen RUE and firm, fixed R supraclavicular lymphadenopathy on presentation, suspicious for malignancy, family aware and does not wish to pursue cancer workup and elected to make patient DNR/DNI. Pt initially started on CTX and azithro (10/30-10/31) for RLL infiltrate and positive UA; later dc'd due to low suspicion for PNA and UTI. Renal function unchanged, however Ca improving with fluids and calcitonin (10/31-11/1). Patient periodically desaturates requiring NRB/CPAP, likely 2/2 episodic atelectasis; otherwise tolerates RA/NC. Pt was started on seroquel 12.5mg qhs for agitation/worsening delirium. Per palliative discussion with pt's sons, pt is DNR/DNI, comfort measures. Lakhani discontinued on 11/2. Pt was transferred from [facility] to [facility] for further management. - per note pt lives alone prior to admission was independent with ADLs, now with functional quadriplegia -    - acute hypoxic respiratory failure.  - delerium vs encephalopathy  - hypercalcemia -likely malignancy related.  - functional quadriplegia.  - HTN  - HLD,  - DM type 2  - COPD -   - CKD stage 3-   - oliguric renal failure   - palliative team evaluation appreciated.  - surrogate sons  - fu refer to hospice.  - lakhani was removed 11/2- monitor for urinary retension and might need lakhani back in if retension ( for comfort measure)  - pleasure feed as tolerated with puree diet.  - can stop statin, to continue amlodipine as tolerate.  - pain management.    - poc dw medical team. team to notify pmd of patient condition.    Dr. Mak Doyle covering 11/3-11/9/2022
COPD, DM2, hypercalcemia, supraclavicular mass, AHRF, ATN  physical as above  add lasix to regimen today to help with the hypercalcemia  oxygen needs labile likely reflecting atelectasis; to mobilize and add CPAP treatments q 6h  renal function stable  continue GOC discussions  decision making of high complexity
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 Pt with swollen RUE and firm, fixed R supraclavicular lymphadenopathy on presentation, suspicious for malignancy, family aware and does not wish to pursue cancer workup and elected to make patient DNR/DNI. Pt initially started on CTX and azithro (10/30-10/31) for RLL infiltrate and positive UA; later dc'd due to low suspicion for PNA and UTI. Renal function unchanged, however Ca improving with fluids and calcitonin (10/31-11/1). Patient periodically desaturates requiring NRB/CPAP, likely 2/2 episodic atelectasis; otherwise tolerates RA/NC. Pt was started on seroquel 12.5mg qhs for agitation/worsening delirium. Per palliative discussion with pt's sons, pt is DNR/DNI, comfort measures. Tamez discontinued on 11/2. Pt was transferred from [facility] to [facility] for further management. - per note pt lives alone prior to admission was independent with ADLs, now with functional quadriplegia -    - acute hypoxic respiratory failure.  - delirium vs encephalopathy  - hypercalcemia -likely malignancy related.  - functional quadriplegia.  - HTN  - HLD,  - DM type 2  - COPD -   - CKD stage 3-   - oliguric renal failure   - palliative team evaluation appreciated.  - surrogate sons  - fu refer to [facility]  - Tamez removed , f/u TOV/Bladder scan   - pleasure feed as tolerated with puree diet.  - Titrate O2 to keep SpO2>90% ( on NC6L w. SpO2 100%)   - can stop statin, to continue amlodipine as tolerate.  - pain management.  - DNR/DNI, MOLST form filled out last admission  DECISION MAKER: Patient is able to make decisions but defers to sons  LEGAL SURROGATE: Emil Smith [phone] / Simone Smith [phone] / Lucas Smith [phone]    - POC as d/w 7 WO team, Dr. Leeanne Suazo

## 2022-11-03 NOTE — PROGRESS NOTE ADULT - PROBLEM SELECTOR PLAN 3
Patient most likely has hypercalcemia 2/2 undiagnosed malignancy, for which family does not want to pursue work up and wants to concentrate on the patients comfort. Patient with new RUE edema, supraclavicular lymphadenopathy and hypercalcemia, suspect patient may have primary lung malignancy. Continue care as per primary team. Plan remains symptom directed care.

## 2022-11-03 NOTE — PROGRESS NOTE ADULT - PROBLEM SELECTOR PLAN 2
DDx for hypercalcemia includes severe dehydration, immobility, multiple myeloma, hypercalcemia 2/2 undiagnosed malignancy, hypothyroidism. S/p 1L bolus of LR. Patient with significant hypovolemia on exam  Corrected Calcium remains >12. Total protein 3.3 (L), Protein gap 2.1 (albumin 1.2). TSH 2.36 (wnl)   UA showed few calcium oxalate crystals. Given new RUE edema, supraclavicular lymphadenopathy and hypercalcemia, suspect patient may have primary lung malignancy. Pt does not want workup for malignancy at this time and prefers comfort measures, per discussion with palliative.     - Will d/c fluids and treatment, transition to comfort care  - s/p calcitonin 160u BID (10/31-11/1), s/p Lasix 40mg IVP 11/1 Patient saturating well on 6L NC. CXR/POCUS w/ small R pleural effusion. Given episodic nature of desaturation suspect intermittent atelectasis, relieved with positive pressure. Patient was started on CPAP for 1hr q6hrs, however patient does not tolerate CPAP well.    - Discontinued CPAP, transitioned pt to comfort measures  - NC as tolerated

## 2022-11-03 NOTE — DISCHARGE NOTE PROVIDER - CARE PROVIDER_API CALL
DAVID MAGALLANES  Internal Medicine  [address] [place] [address], [place] [address]  Phone: [phone]  Fax: [phone]  Established Patient  Follow Up Time:

## 2022-11-03 NOTE — PROGRESS NOTE ADULT - ASSESSMENT
80yo, Cantonese/English speaking, former smoker male PMHx HTN, asthma vs COPD (?) (no hospitalizations/intubations) HLD, DM2, HFpEF, presents with fatigue and weakness for two weeks. Found to have symptomatic hypercalcemia, UTI, and CAP, admitted for further management.  82yo, Cantonese/English speaking, former smoker male PMHx HTN, asthma vs COPD (?) (no hospitalizations/intubations) HLD, DM2, HFpEF, presents with fatigue and weakness for two weeks, admitted for symptomatic hypercalcemia, improved with fluids and calcitonin. Presentation concerning for malignancy however family does not want to pursue further workup, DNR/DNI, comfort measures.

## 2022-11-03 NOTE — DISCHARGE NOTE PROVIDER - DETAILS OF MALNUTRITION DIAGNOSIS/DIAGNOSES
This patient has been assessed with a concern for Malnutrition and was treated during this hospitalization for the following Nutrition diagnosis/diagnoses:     -  10/31/2022: Severe protein-calorie malnutrition   -  10/31/2022: Underweight (BMI < 19)

## 2022-11-03 NOTE — PROGRESS NOTE ADULT - PROBLEM SELECTOR PLAN 7
#Asthma/COPD  - on 2L O2, saturating 97%   - patient's son reports possible copd diagnosis, former smoker. Documentation reports asthma hx. No wheezing on exam    - continue with duoneb prn, on proair prn at home   - wean off O2 EZNO on CKD (baseline ~Cr 2.09), on admission CR 3.89, FeUrea 52.1% (intrinsic) FeNa: 0.9% (pre-renal)  I/s/o dehydration 2/2 poor PO intake. UOP responded well to IV lasix. However, will d/c further workup and transition to comfort care.    - Tamez initially placed for strict Is &Os i/s/o oliguric renal failure  - dc'd Tamez today (11/2 AM)  - Bladder scans q6h to monitor for urinary retention; if pt has urinary retention, would benefit from Tamez for comfort

## 2022-11-03 NOTE — PROGRESS NOTE ADULT - PROBLEM SELECTOR PLAN 1
Patient saturating well on 6L NC. CXR/POCUS w/ small R pleural effusion. Given episodic nature of desaturation suspect intermittent atelectasis, relieved with positive pressure. Patient was started on CPAP for 1hr q6hrs, however patient does not tolerate CPAP well.    - Discontinued CPAP, transitioned pt to comfort measures  - NC as tolerated Former smoker, presented with symptomatic hypercalcemia, R supraclavicular lymphadenopathy, and RUE swelling, suspicious for lung malignancy. Family aware and does not want to pursue cancer workup.     - Palliative following, DNR/DNI, comfort measures (as of 11/2/2022)  - F/u pall regarding dispo

## 2022-11-03 NOTE — PROGRESS NOTE ADULT - SUBJECTIVE AND OBJECTIVE BOX
LIANNA SMITH             MRN-[id number]    CC: Weakness    HPI:  80yo, Cantonese/English speaking, former smoker male PMHx HTN, asthma vs COPD (?) (no hospitalizations/intubations) HLD, DM2, HFpEF, presents with fatigue and weakness for two weeks. Per son at bedside, who is historian, patient has been feeling out of it, is less conversational and has reported some SOB. Patient was recently admitted a couple weeks ago to [facility] fo mechanical fall, and was admitted to cardiac telemetry for diastolic heart failure and IV lasix was given, TTE revealed normal EF. Patient was discharged to [facility] and has been at rehab for two weeks. At rehab, son reports patient with poor appetite / PO intake for two weeks. He has progressively gotten weaker. Patient's son reports prior to rehab, father lived at home by himself, independent in most ADLs. His other son went over 2 days per week. He reports chronic smoking and believes patient was diagnosed with COPD but is not sure, he uses pro-air occasionally.     ICU consult called for hypercalcemia, ~13.9. Patient has prior hypercalcemia, mild to 11. Not worked up. Son states patient was on hctz, but not on rehab med list. At rehab unclear if he was given diuresis but were concerned about hypocalcemia. Patient is currently AAOX2 (name and place), denies focal symptoms. Drowsy appearing. Denies chest pain, palpitations, dizziness, vomiting, abd pain, N/D, dysuria, HA.     ED vitals: T afebrile  HR 58  RR  /78 SpO2 97 on 2L   Labs significant ~Ca 13, BUN 66, Cr 3.96 (baseline ~2)   Imaging/EKG: EKG: sinus bradycardia, CXR: RLL opacity   Interventions: LR 500cc bolus   Consults: ICU   (30 Oct 2022 18:51)    SUBJECTIVE: Patient resting in bed. No longer requring BiPap. Has received 4 PRNS of his Dilaudid 0.25mg q2h IV in 24 hours. Appears comfortable.    ROS:  DYSPNEA (Matthew): 0  NAUS/VOM: N	  SECRETIONS: N	  AGITATION: N  Pain (Y/N):  N     -Provocation/Palliation: n/a   -Quality/Quantity: n/a  -Radiating: n/a  -Severity: n/a  -Timing/Frequency: n/a  -Impact on ADLs: n/a    OTHER REVIEW OF SYSTEMS: unable to obtain   UNABLE TO OBTAIN  due to: AMS    PEx:  T(C): 36.8 (11-03-22 @ 06:34), Max: 37.3 (11-02-22 @ 16:22)  HR: 86 (11-03-22 @ 06:34) (80 - 86)  BP: 117/67 (11-03-22 @ 06:34) (115/54 - 118/56)  RR: 17 (11-03-22 @ 06:34) (16 - 17)  SpO2: 94% (11-03-22 @ 06:34) (89% - 100%)  Wt(kg): 40.8kg    GENERAL:  [ x]Alert  [ ]Oriented x   [ ]Lethargic  [ ]Cachexia  [ ]Unarousable  [x] minimally Verbal  [ ]Non-Verbal  Behavioral:   [ ] Anxiety  [ ] Delirium [ ] Agitation [x ] Other - calm   HEENT:  [ ]Normal   [x ]Dry mouth   [ ]ET Tube/Trach  [ ]Oral lesions   PULMONARY:   [X ]Clear  [ ]Tachypnea  [ ]Audible excessive secretions   [ ]Rhonchi        [ ]Right [ ]Left [ ]Bilateral  [ ]Crackles        [ ]Right [ ]Left [ ]Bilateral  [ ]Wheezing     [ ]Right [ ]Left [ ]Bilateral  CARDIOVASCULAR:    [X ]Regular [ ]Irregular [ ]Tachy  [ ]Hunter [ ]Murmur [ ]Other  GASTROINTESTINAL:  [X ]Soft  [ ]Distended   [ ]+BS  [X ]Non tender [ ]Tender  [ ]PEG [ ]OGT/ NGT  Last BM:   GENITOURINARY:  [ ]Normal [ ] Incontinent   [ ]Oliguria/Anuria   [X ]Tamez  MUSCULOSKELETAL:   [ ]Normal   [X ]Weakness  [ ]Bed/Wheelchair bound [ ]Edema  NEUROLOGIC:   [ ]No focal deficits  [ ] Cognitive impairment  [ ] Dysphagia [ ]Dysarthria [ ] Paresis [ ]Other   SKIN:   [X ]Normal   [ ]Pressure ulcer(s)  [ ]Rash      ALLERGIES: No Known Allergies      OPIATE NAÏVE (Y/N): Y    MEDICATIONS: REVIEWED  MEDICATIONS  (STANDING):  albuterol/ipratropium for Nebulization 3 milliLiter(s) Nebulizer every 6 hours  amLODIPine   Tablet 10 milliGRAM(s) Oral every 24 hours  isosorbide   mononitrate ER Tablet (IMDUR) 30 milliGRAM(s) Oral every 24 hours  QUEtiapine 12.5 milliGRAM(s) Oral at bedtime    MEDICATIONS  (PRN):  acetaminophen     Tablet .. 650 milliGRAM(s) Oral every 6 hours PRN Temp greater or equal to 38C (100.4F), Mild Pain (1 - 3)  glycopyrrolate 1 milliGRAM(s) Oral every 6 hours PRN secretions  HYDROmorphone  Injectable 0.25 milliGRAM(s) IV Push every 2 hours PRN SOB / RR > 22    LABS: REVIEWED  CBC:                        8.8    8.24  )-----------( 260      ( 02 Nov 2022 07:06 )             28.4     CMP:    11-02    143  |  105  |  53<H>  ----------------------------<  94  3.6   |  26  |  3.71<H>    Ca    10.9<H>      02 Nov 2022 07:06  Phos  3.4     11-02  Mg     2.5     11-02    TPro  5.8<L>  /  Alb  1.9<L>  /  TBili  0.3  /  DBili  x   /  AST  20  /  ALT  14  /  AlkPhos  70  11-02    IMAGING: REVIEWED    Advanced Directives:     DNR/DNI     MOLST     HCP     DECISION MAKER: Patient is able to make decisions but defers to sons  LEGAL SURROGATE: Emil Smith [phone] / Simone Smith [phone] / Lucas Smith [phone]     PSYCHOSOCIAL-SPIRITUAL ASSESSMENT:       Reviewed       Care plan unchanged    GOALS OF CARE DISCUSSION       Palliative care info/counseling provided	           Family meeting       Advanced Directives addressed please see Advance Care Planning Note	           See previous Palliative Medicine Note       Documentation of GOC: DNR/DNI  	      AGENCY CHOICE DISCUSSED:             Hospice    REFERRALS	        Palliative Med        Hospice

## 2022-11-03 NOTE — PROGRESS NOTE ADULT - PROBLEM SELECTOR PLAN 4
Patient with an undiagnosed malignancy which family does not want to work up given patient condition. Patient currently Comfortable on current regimen. Plan for Inpatient hospice.

## 2022-11-03 NOTE — DISCHARGE NOTE PROVIDER - NSDCMRMEDTOKEN_GEN_ALL_CORE_FT
albuterol 0.63 mg/3 mL (0.021%) inhalation solution: 3 milliliter(s) inhaled 3 times a day, As Needed  amLODIPine 10 mg oral tablet: 1 tab(s) orally once a day  aspirin 81 mg oral delayed release tablet: 1 tab(s) orally once a day  atorvastatin 20 mg oral tablet: 1 tab(s) orally once a day (at bedtime)  Imdur 30 mg oral tablet, extended release: 1 tab(s) orally once a day (in the morning)  Multiple Vitamins oral tablet: 1 tab(s) orally once a day  ranolazine 500 mg oral tablet, extended release: 1 tab(s) orally 2 times a day   acetaminophen 325 mg oral tablet: 2 tab(s) orally every 6 hours, As needed, Temp greater or equal to 38C (100.4F), Mild Pain (1 - 3)  amLODIPine 10 mg oral tablet: 1 tab(s) orally every 24 hours  ipratropium-albuterol 0.5 mg-2.5 mg/3 mL inhalation solution: 3 milliliter(s) inhaled every 6 hours  isosorbide mononitrate 30 mg oral tablet, extended release: 1 tab(s) orally every 24 hours  ranolazine 500 mg oral tablet, extended release: 1 tab(s) orally 2 times a day

## 2022-11-04 PROCEDURE — 93971 EXTREMITY STUDY: CPT

## 2022-11-04 PROCEDURE — 94660 CPAP INITIATION&MGMT: CPT

## 2022-11-04 PROCEDURE — 82553 CREATINE MB FRACTION: CPT

## 2022-11-04 PROCEDURE — 82330 ASSAY OF CALCIUM: CPT

## 2022-11-04 PROCEDURE — 82310 ASSAY OF CALCIUM: CPT

## 2022-11-04 PROCEDURE — 83970 ASSAY OF PARATHORMONE: CPT

## 2022-11-04 PROCEDURE — 84133 ASSAY OF URINE POTASSIUM: CPT

## 2022-11-04 PROCEDURE — 86334 IMMUNOFIX E-PHORESIS SERUM: CPT

## 2022-11-04 PROCEDURE — 71045 X-RAY EXAM CHEST 1 VIEW: CPT

## 2022-11-04 PROCEDURE — 82803 BLOOD GASES ANY COMBINATION: CPT

## 2022-11-04 PROCEDURE — 85379 FIBRIN DEGRADATION QUANT: CPT

## 2022-11-04 PROCEDURE — 82040 ASSAY OF SERUM ALBUMIN: CPT

## 2022-11-04 PROCEDURE — 87186 SC STD MICRODIL/AGAR DIL: CPT

## 2022-11-04 PROCEDURE — 86901 BLOOD TYPING SEROLOGIC RH(D): CPT

## 2022-11-04 PROCEDURE — 83935 ASSAY OF URINE OSMOLALITY: CPT

## 2022-11-04 PROCEDURE — 80053 COMPREHEN METABOLIC PANEL: CPT

## 2022-11-04 PROCEDURE — 86900 BLOOD TYPING SEROLOGIC ABO: CPT

## 2022-11-04 PROCEDURE — 84443 ASSAY THYROID STIM HORMONE: CPT

## 2022-11-04 PROCEDURE — 86850 RBC ANTIBODY SCREEN: CPT

## 2022-11-04 PROCEDURE — 80076 HEPATIC FUNCTION PANEL: CPT

## 2022-11-04 PROCEDURE — 99238 HOSP IP/OBS DSCHRG MGMT 30/<: CPT | Mod: GC

## 2022-11-04 PROCEDURE — 84100 ASSAY OF PHOSPHORUS: CPT

## 2022-11-04 PROCEDURE — 84540 ASSAY OF URINE/UREA-N: CPT

## 2022-11-04 PROCEDURE — 83519 RIA NONANTIBODY: CPT

## 2022-11-04 PROCEDURE — 83735 ASSAY OF MAGNESIUM: CPT

## 2022-11-04 PROCEDURE — 84436 ASSAY OF TOTAL THYROXINE: CPT

## 2022-11-04 PROCEDURE — 84300 ASSAY OF URINE SODIUM: CPT

## 2022-11-04 PROCEDURE — 99291 CRITICAL CARE FIRST HOUR: CPT | Mod: 25

## 2022-11-04 PROCEDURE — 81001 URINALYSIS AUTO W/SCOPE: CPT

## 2022-11-04 PROCEDURE — 82550 ASSAY OF CK (CPK): CPT

## 2022-11-04 PROCEDURE — 84165 PROTEIN E-PHORESIS SERUM: CPT

## 2022-11-04 PROCEDURE — 82306 VITAMIN D 25 HYDROXY: CPT

## 2022-11-04 PROCEDURE — 83880 ASSAY OF NATRIURETIC PEPTIDE: CPT

## 2022-11-04 PROCEDURE — 85025 COMPLETE CBC W/AUTO DIFF WBC: CPT

## 2022-11-04 PROCEDURE — 84155 ASSAY OF PROTEIN SERUM: CPT

## 2022-11-04 PROCEDURE — 85027 COMPLETE CBC AUTOMATED: CPT

## 2022-11-04 PROCEDURE — 94640 AIRWAY INHALATION TREATMENT: CPT

## 2022-11-04 PROCEDURE — 36415 COLL VENOUS BLD VENIPUNCTURE: CPT

## 2022-11-04 PROCEDURE — 87635 SARS-COV-2 COVID-19 AMP PRB: CPT

## 2022-11-04 PROCEDURE — 84480 ASSAY TRIIODOTHYRONINE (T3): CPT

## 2022-11-04 PROCEDURE — 84484 ASSAY OF TROPONIN QUANT: CPT

## 2022-11-04 PROCEDURE — 83605 ASSAY OF LACTIC ACID: CPT

## 2022-11-04 PROCEDURE — 82570 ASSAY OF URINE CREATININE: CPT

## 2022-11-04 PROCEDURE — 82652 VIT D 1 25-DIHYDROXY: CPT

## 2022-11-04 PROCEDURE — 92526 ORAL FUNCTION THERAPY: CPT

## 2022-11-04 PROCEDURE — 87086 URINE CULTURE/COLONY COUNT: CPT

## 2022-11-04 PROCEDURE — 80048 BASIC METABOLIC PNL TOTAL CA: CPT

## 2022-11-04 PROCEDURE — 84156 ASSAY OF PROTEIN URINE: CPT

## 2022-11-04 PROCEDURE — 87040 BLOOD CULTURE FOR BACTERIA: CPT

## 2022-11-04 PROCEDURE — 84145 PROCALCITONIN (PCT): CPT

## 2022-11-04 PROCEDURE — 82962 GLUCOSE BLOOD TEST: CPT

## 2022-11-04 RX ORDER — ROBINUL 0.2 MG/ML
0.2 INJECTION INTRAMUSCULAR; INTRAVENOUS
Qty: 0 | Refills: 0 | DISCHARGE
Start: 2022-11-04

## 2022-11-04 RX ORDER — QUETIAPINE FUMARATE 200 MG/1
12.5 TABLET, FILM COATED ORAL
Qty: 0 | Refills: 0 | DISCHARGE
Start: 2022-11-04

## 2022-11-04 RX ORDER — HYDROMORPHONE HYDROCHLORIDE 2 MG/ML
0.25 INJECTION INTRAMUSCULAR; INTRAVENOUS; SUBCUTANEOUS
Qty: 0 | Refills: 0 | DISCHARGE
Start: 2022-11-04

## 2022-11-04 RX ORDER — RANOLAZINE 500 MG/1
1 TABLET, FILM COATED, EXTENDED RELEASE ORAL
Qty: 0 | Refills: 0 | DISCHARGE

## 2022-11-04 RX ADMIN — HYDROMORPHONE HYDROCHLORIDE 0.25 MILLIGRAM(S): 2 INJECTION INTRAMUSCULAR; INTRAVENOUS; SUBCUTANEOUS at 07:19

## 2022-11-04 RX ADMIN — HYDROMORPHONE HYDROCHLORIDE 0.25 MILLIGRAM(S): 2 INJECTION INTRAMUSCULAR; INTRAVENOUS; SUBCUTANEOUS at 10:04

## 2022-11-04 RX ADMIN — HYDROMORPHONE HYDROCHLORIDE 0.25 MILLIGRAM(S): 2 INJECTION INTRAMUSCULAR; INTRAVENOUS; SUBCUTANEOUS at 09:34

## 2022-11-04 RX ADMIN — HYDROMORPHONE HYDROCHLORIDE 0.25 MILLIGRAM(S): 2 INJECTION INTRAMUSCULAR; INTRAVENOUS; SUBCUTANEOUS at 07:35

## 2022-11-04 RX ADMIN — Medication 3 MILLILITER(S): at 07:13

## 2022-11-04 NOTE — PROGRESS NOTE ADULT - PROBLEM SELECTOR PLAN 11
PTS DAUGHTER VERY UPSET THAT PT CANNOT HAVE A VISITOR DUE TO THE PT'S SHORTNESS OF BREATH. RN EXPLAINED TO PT'S FAMILY THAT EXCEPTIONS TO THE RULE CANNOT BE MADE IN THIS SITUATION. PT'S FAMILY STATES, \"WELL I GUESS THAT'S WHAT SHE GETS FOR TELLING THE TRUTH. \"     Cyndee Justin RN  11/11/20 4217 - discontinued fingersticks

## 2022-11-04 NOTE — PROGRESS NOTE ADULT - PROBLEM SELECTOR PLAN 3
DDx for hypercalcemia includes severe dehydration, immobility, multiple myeloma, hypercalcemia 2/2 undiagnosed malignancy, hypothyroidism. S/p 1L bolus of LR. Patient with significant hypovolemia on exam  Corrected Calcium remains >12. Total protein 3.3 (L), Protein gap 2.1 (albumin 1.2). TSH 2.36 (wnl)   UA showed few calcium oxalate crystals. Given new RUE edema, supraclavicular lymphadenopathy and hypercalcemia, suspect patient may have primary lung malignancy. Pt does not want workup for malignancy at this time and prefers comfort measures, per discussion with palliative.     - Will d/c fluids and treatment, transition to comfort care  - s/p calcitonin 160u BID (10/31-11/1), s/p Lasix 40mg IVP 11/1

## 2022-11-04 NOTE — PROGRESS NOTE ADULT - PROVIDER SPECIALTY LIST ADULT
Internal Medicine
Hospitalist
Palliative Care
Internal Medicine
Internal Medicine
Palliative Care
Internal Medicine

## 2022-11-04 NOTE — PROGRESS NOTE ADULT - PROBLEM SELECTOR PLAN 4
Small R-sided effusion seen on POCUS. Procal 0.12 confounded by CKD. Given lack of focal lungs, no fever and no leukocytosis low suspicion for CAP    - s/p CTX and azithro (10/30-10/31); later dc'd given low suspicion for CAP  - monitor resp status; NC as needed

## 2022-11-04 NOTE — PROGRESS NOTE ADULT - ASSESSMENT
80yo, Cantonese/English speaking, former smoker male PMHx HTN, asthma vs COPD (?) (no hospitalizations/intubations) HLD, DM2, HFpEF, presents with fatigue and weakness for two weeks, admitted for symptomatic hypercalcemia, improved with fluids and calcitonin. Presentation concerning for malignancy however family does not want to pursue further workup, DNR/DNI, comfort measures.

## 2022-11-04 NOTE — PROGRESS NOTE ADULT - PROBLEM SELECTOR PLAN 12
F: None  E: Replete Mg>2, K>4  N: Comfort feeds  P: Heparin 5000u q12h
F: None  E: Replete Mg>2, K>4  N: Comfort feeds  P: Heparin 5000u q12h

## 2022-11-04 NOTE — PROGRESS NOTE ADULT - PROBLEM SELECTOR PLAN 5
UA: +nitrites, small LE, >10WBC, many bacteria. Difficult to assess urinary symptoms due to mental status. However no fever or leukocytosis to suggest infection.     - Urine cx growing E. faecalis; however, pt asx  - s/p CTX and azithro (10/30-10/31); initially started for RLL infiltrate and concern for CAP, later dc'd given low suspicion for CAP

## 2022-11-04 NOTE — PROGRESS NOTE ADULT - SUBJECTIVE AND OBJECTIVE BOX
Patient is a 81y old  Male who presents with a chief complaint of Weakness (03 Nov 2022 11:05)    INTERVAL EVENTS:NAEON    SUBJECTIVE:  Patient was seen and examined at bedside. Pt resting comfortably on NC4L, no respiratory distress, eyes open, but too weak to talk , nodded head, son Emil Smith at bedside, plan for [facility] today.    Review of systems: No fever, chills, dizziness, HA, Changes in vision, CP, dyspnea, nausea or vomiting, dysuria, changes in bowel movements, LE edema. Rest of 12 point Review of systems negative unless otherwise documented elsewhere in note.     Diet, Pureed (11-01-22 @ 13:15) [Active]      MEDICATIONS:  MEDICATIONS  (STANDING):  albuterol/ipratropium for Nebulization 3 milliLiter(s) Nebulizer every 6 hours  amLODIPine   Tablet 10 milliGRAM(s) Oral every 24 hours  isosorbide   mononitrate ER Tablet (IMDUR) 30 milliGRAM(s) Oral every 24 hours  QUEtiapine 12.5 milliGRAM(s) Oral at bedtime    MEDICATIONS  (PRN):  acetaminophen     Tablet .. 650 milliGRAM(s) Oral every 6 hours PRN Temp greater or equal to 38C (100.4F), Mild Pain (1 - 3)  glycopyrrolate Injectable 0.2 milliGRAM(s) IV Push every 6 hours PRN excessive secretions  HYDROmorphone  Injectable 0.25 milliGRAM(s) IV Push every 2 hours PRN SOB / RR > 22      Allergies    No Known Allergies    Intolerances        OBJECTIVE:  Vital Signs Last 24 Hrs  T(C): 36.7 (03 Nov 2022 20:50), Max: 36.7 (03 Nov 2022 17:59)  T(F): 98 (03 Nov 2022 20:50), Max: 98.1 (03 Nov 2022 17:59)  HR: 90 (03 Nov 2022 20:50) (88 - 90)  BP: 113/59 (03 Nov 2022 20:50) (113/59 - 120/49)  BP(mean): --  RR: 16 (03 Nov 2022 20:50) (16 - 16)  SpO2: 90% (03 Nov 2022 20:50) (90% - 91%)    Parameters below as of 03 Nov 2022 20:50  Patient On (Oxygen Delivery Method): nasal cannula  O2 Flow (L/min): 6    I&O's Summary      PHYSICAL EXAM:  Gen: Reclining in bed at time of exam, appears stated age on NC4L   HEENT: NCAT, MMM, clear OP  Neck: supple, trachea at midline  CV: RRR, +S1/S2  Pulm: adequate respiratory effort, no increase in work of breathing  Abd: soft, NTND  Skin: warm and dry,   Ext: WWP, no LE edema  Neuro: Awake, alert, non-verbal, functional quadriplegia   :     LABS:              CAPILLARY BLOOD GLUCOSE            MICRODATA:      RADIOLOGY/OTHER STUDIES:

## 2022-11-04 NOTE — PROGRESS NOTE ADULT - PROBLEM SELECTOR PLAN 1
Former smoker, presented with symptomatic hypercalcemia, R supraclavicular lymphadenopathy, and RUE swelling, suspicious for lung malignancy. Family aware and does not want to pursue cancer workup.     - Palliative following, DNR/DNI, comfort measures (as of 11/2/2022)  - F/u pall regarding dispo

## 2022-11-04 NOTE — PROGRESS NOTE ADULT - PROBLEM SELECTOR PLAN 7
ENZO on CKD (baseline ~Cr 2.09), on admission CR 3.89, FeUrea 52.1% (intrinsic) FeNa: 0.9% (pre-renal)  I/s/o dehydration 2/2 poor PO intake. UOP responded well to IV lasix. However, will d/c further workup and transition to comfort care.    - Tamez initially placed for strict Is &Os i/s/o oliguric renal failure  - dc'd Tamez today (11/2 AM)  - Bladder scans q6h to monitor for urinary retention; if pt has urinary retention, would benefit from Tamez for comfort

## 2022-11-04 NOTE — PROGRESS NOTE ADULT - PROBLEM SELECTOR PLAN 2
Patient saturating well on 6L NC. CXR/POCUS w/ small R pleural effusion. Given episodic nature of desaturation suspect intermittent atelectasis, relieved with positive pressure. Patient was started on CPAP for 1hr q6hrs, however patient does not tolerate CPAP well.    - Discontinued CPAP, transitioned pt to comfort measures  - NC as tolerated

## 2022-11-04 NOTE — PROGRESS NOTE ADULT - ASSESSMENT
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 Pt with swollen RUE and firm, fixed R supraclavicular lymphadenopathy on presentation, suspicious for malignancy, family aware and does not wish to pursue cancer workup and elected to make patient DNR/DNI. Pt initially started on CTX and azithro (10/30-10/31) for RLL infiltrate and positive UA; later dc'd due to low suspicion for PNA and UTI. Renal function unchanged, however Ca improving with fluids and calcitonin (10/31-11/1). Patient periodically desaturates requiring NRB/CPAP, likely 2/2 episodic atelectasis; otherwise tolerates RA/NC. Pt was started on seroquel 12.5mg qhs for agitation/worsening delirium. Per palliative discussion with pt's sons, pt is DNR/DNI, comfort measures. Tamez discontinued on 11/2. Pt was transferred from [facility] to [facility] for further management. - per note pt lives alone prior to admission was independent with ADLs, now with functional quadriplegia -    - acute hypoxic respiratory failure.  - delirium vs encephalopathy  - hypercalcemia -likely malignancy related.  - functional quadriplegia.  - HTN  - HLD,  - DM type 2  - COPD -   - CKD stage 3-   - oliguric renal failure   - palliative team evaluation appreciated.  - surrogate sons  - d/c to [facility] hospice today   - pleasure feed as tolerated with puree diet.  - Titrate O2 to keep SpO2>90% ( on NC6L w. SpO2 100%)   - can stop statin, to continue amlodipine as tolerate.  - pain management.  - DNR/DNI, MOLST form filled out last admission  DECISION MAKER: Patient is able to make decisions but defers to sons  LEGAL SURROGATE: Emil Smith [phone] / Simone Smith [phone] / Lucas Smith [phone]    - POC as d/w surrogate/son Emil Smith and 7 WO team, Dr. Leeanne Suazo .

## 2022-11-04 NOTE — PROGRESS NOTE ADULT - NUTRITIONAL ASSESSMENT
This patient has been assessed with a concern for Malnutrition and has been determined to have a diagnosis/diagnoses of Severe protein-calorie malnutrition and Underweight (BMI < 19).    This patient is being managed with:   Diet NPO-  Except Medications  Entered: Nov 1 2022 10:08AM    
This patient has been assessed with a concern for Malnutrition and has been determined to have a diagnosis/diagnoses of Severe protein-calorie malnutrition and Underweight (BMI < 19).    This patient is being managed with:   Diet Pureed-  Entered: Nov 1 2022  1:15PM    

## 2022-11-04 NOTE — PROGRESS NOTE ADULT - SUBJECTIVE AND OBJECTIVE BOX
OVERNIGHT EVENTS:   No acute events overnight.    SUBJECTIVE:  Pt seen and examined at bedside, with son Emil present at bedside. Resting comfortably on 6L NC with no respiratory distress. Opens eyes intermittently but not replying to questions or commands.     VITAL SIGNS:  Vital Signs Last 24 Hrs  T(C): 36.7 (03 Nov 2022 20:50), Max: 36.7 (03 Nov 2022 17:59)  T(F): 98 (03 Nov 2022 20:50), Max: 98.1 (03 Nov 2022 17:59)  HR: 90 (03 Nov 2022 20:50) (85 - 90)  BP: 113/59 (03 Nov 2022 20:50) (107/64 - 120/49)  BP(mean): --  RR: 16 (03 Nov 2022 20:50) (16 - 16)  SpO2: 90% (03 Nov 2022 20:50) (90% - 91%)    Parameters below as of 03 Nov 2022 20:50  Patient On (Oxygen Delivery Method): nasal cannula  O2 Flow (L/min): 6      PHYSICAL EXAM:  General: resting in bed in NAD; breathing on 6L NC with no respiratory distress   HEENT: NC/AT  Neck: R-sided firm, supraclavicular lymphadenopathy  Cardiac: distant heart sounds; +S1/S2  Pulm: mildly decreased breath sounds, breathing on 6L NC, no accessory muscle use, no intercostal retractions  GI: soft, NT/ND, +BS  Extremities:  no edema, clubbing or cyanosis  Vasc: 2+ radial pulses B/L  Neuro: opens eyes intermittently but not replying to questions or commands     MEDICATIONS:  MEDICATIONS  (STANDING):  albuterol/ipratropium for Nebulization 3 milliLiter(s) Nebulizer every 6 hours  amLODIPine   Tablet 10 milliGRAM(s) Oral every 24 hours  isosorbide   mononitrate ER Tablet (IMDUR) 30 milliGRAM(s) Oral every 24 hours  QUEtiapine 12.5 milliGRAM(s) Oral at bedtime    MEDICATIONS  (PRN):  acetaminophen     Tablet .. 650 milliGRAM(s) Oral every 6 hours PRN Temp greater or equal to 38C (100.4F), Mild Pain (1 - 3)  glycopyrrolate Injectable 0.2 milliGRAM(s) IV Push every 6 hours PRN excessive secretions  HYDROmorphone  Injectable 0.25 milliGRAM(s) IV Push every 2 hours PRN SOB / RR > 22      ALLERGIES:  Allergies    No Known Allergies    Intolerances        LABS:              RADIOLOGY & ADDITIONAL TESTS: Reviewed.

## 2022-11-04 NOTE — PROGRESS NOTE ADULT - PROBLEM SELECTOR PLAN 8
#Asthma/COPD  - on 2L O2, saturating 97%   - patient's son reports possible copd diagnosis, former smoker. Documentation reports asthma hx. No wheezing on exam    - continue with duoneb prn, on proair prn at home   - wean off O2

## 2022-11-04 NOTE — PROGRESS NOTE ADULT - PROBLEM SELECTOR PLAN 6
RUE with pitting edema to shoulder, possible DVT. Wells 4.5 (+3 for likely DVT RUE, +1.5 for HR>100). Ddimer 511    - pt now comfort measures, dc'd RUE Dopplers  - elevation of RUE

## 2022-11-05 LAB
CULTURE RESULTS: SIGNIFICANT CHANGE UP
CULTURE RESULTS: SIGNIFICANT CHANGE UP
SPECIMEN SOURCE: SIGNIFICANT CHANGE UP
SPECIMEN SOURCE: SIGNIFICANT CHANGE UP

## 2022-11-08 LAB — PTH RELATED PROT SERPL-MCNC: <2 PMOL/L — SIGNIFICANT CHANGE UP

## 2022-11-08 PROCEDURE — 84484 ASSAY OF TROPONIN QUANT: CPT

## 2022-11-08 PROCEDURE — 81003 URINALYSIS AUTO W/O SCOPE: CPT

## 2022-11-08 PROCEDURE — 84300 ASSAY OF URINE SODIUM: CPT

## 2022-11-08 PROCEDURE — 85025 COMPLETE CBC W/AUTO DIFF WBC: CPT

## 2022-11-08 PROCEDURE — 87635 SARS-COV-2 COVID-19 AMP PRB: CPT

## 2022-11-08 PROCEDURE — 82962 GLUCOSE BLOOD TEST: CPT

## 2022-11-08 PROCEDURE — 80048 BASIC METABOLIC PNL TOTAL CA: CPT

## 2022-11-08 PROCEDURE — 84156 ASSAY OF PROTEIN URINE: CPT

## 2022-11-08 PROCEDURE — 97116 GAIT TRAINING THERAPY: CPT

## 2022-11-08 PROCEDURE — 83880 ASSAY OF NATRIURETIC PEPTIDE: CPT

## 2022-11-08 PROCEDURE — 80053 COMPREHEN METABOLIC PANEL: CPT

## 2022-11-08 PROCEDURE — 97161 PT EVAL LOW COMPLEX 20 MIN: CPT

## 2022-11-08 PROCEDURE — 99285 EMERGENCY DEPT VISIT HI MDM: CPT | Mod: 25

## 2022-11-08 PROCEDURE — 72125 CT NECK SPINE W/O DYE: CPT | Mod: MA

## 2022-11-08 PROCEDURE — 82570 ASSAY OF URINE CREATININE: CPT

## 2022-11-08 PROCEDURE — 97162 PT EVAL MOD COMPLEX 30 MIN: CPT

## 2022-11-08 PROCEDURE — 85027 COMPLETE CBC AUTOMATED: CPT

## 2022-11-08 PROCEDURE — 97530 THERAPEUTIC ACTIVITIES: CPT

## 2022-11-08 PROCEDURE — 70450 CT HEAD/BRAIN W/O DYE: CPT | Mod: MA

## 2022-11-08 PROCEDURE — 83036 HEMOGLOBIN GLYCOSYLATED A1C: CPT

## 2022-11-08 PROCEDURE — 84133 ASSAY OF URINE POTASSIUM: CPT

## 2022-11-08 PROCEDURE — 83735 ASSAY OF MAGNESIUM: CPT

## 2022-11-08 PROCEDURE — 80061 LIPID PANEL: CPT

## 2022-11-08 PROCEDURE — 84100 ASSAY OF PHOSPHORUS: CPT

## 2022-11-08 PROCEDURE — 83935 ASSAY OF URINE OSMOLALITY: CPT

## 2022-11-08 PROCEDURE — 84540 ASSAY OF URINE/UREA-N: CPT

## 2022-11-08 PROCEDURE — 73030 X-RAY EXAM OF SHOULDER: CPT

## 2022-11-08 PROCEDURE — 93306 TTE W/DOPPLER COMPLETE: CPT

## 2022-11-08 PROCEDURE — 36415 COLL VENOUS BLD VENIPUNCTURE: CPT

## 2022-11-08 PROCEDURE — 71045 X-RAY EXAM CHEST 1 VIEW: CPT

## 2022-11-15 DIAGNOSIS — G93.40 ENCEPHALOPATHY, UNSPECIFIED: ICD-10-CM

## 2022-11-15 DIAGNOSIS — R53.2 FUNCTIONAL QUADRIPLEGIA: ICD-10-CM

## 2022-11-15 DIAGNOSIS — E83.52 HYPERCALCEMIA: ICD-10-CM

## 2022-11-15 DIAGNOSIS — B96.89 OTHER SPECIFIED BACTERIAL AGENTS AS THE CAUSE OF DISEASES CLASSIFIED ELSEWHERE: ICD-10-CM

## 2022-11-15 DIAGNOSIS — I82.621 ACUTE EMBOLISM AND THROMBOSIS OF DEEP VEINS OF RIGHT UPPER EXTREMITY: ICD-10-CM

## 2022-11-15 DIAGNOSIS — R59.0 LOCALIZED ENLARGED LYMPH NODES: ICD-10-CM

## 2022-11-15 DIAGNOSIS — Z87.891 PERSONAL HISTORY OF NICOTINE DEPENDENCE: ICD-10-CM

## 2022-11-15 DIAGNOSIS — C90.00 MULTIPLE MYELOMA NOT HAVING ACHIEVED REMISSION: ICD-10-CM

## 2022-11-15 DIAGNOSIS — Z20.822 CONTACT WITH AND (SUSPECTED) EXPOSURE TO COVID-19: ICD-10-CM

## 2022-11-15 DIAGNOSIS — I50.30 UNSPECIFIED DIASTOLIC (CONGESTIVE) HEART FAILURE: ICD-10-CM

## 2022-11-15 DIAGNOSIS — J98.11 ATELECTASIS: ICD-10-CM

## 2022-11-15 DIAGNOSIS — E03.9 HYPOTHYROIDISM, UNSPECIFIED: ICD-10-CM

## 2022-11-15 DIAGNOSIS — N39.0 URINARY TRACT INFECTION, SITE NOT SPECIFIED: ICD-10-CM

## 2022-11-15 DIAGNOSIS — I13.0 HYPERTENSIVE HEART AND CHRONIC KIDNEY DISEASE WITH HEART FAILURE AND STAGE 1 THROUGH STAGE 4 CHRONIC KIDNEY DISEASE, OR UNSPECIFIED CHRONIC KIDNEY DISEASE: ICD-10-CM

## 2022-11-15 DIAGNOSIS — C34.90 MALIGNANT NEOPLASM OF UNSPECIFIED PART OF UNSPECIFIED BRONCHUS OR LUNG: ICD-10-CM

## 2022-11-15 DIAGNOSIS — E43 UNSPECIFIED SEVERE PROTEIN-CALORIE MALNUTRITION: ICD-10-CM

## 2022-11-15 DIAGNOSIS — Z79.82 LONG TERM (CURRENT) USE OF ASPIRIN: ICD-10-CM

## 2022-11-15 DIAGNOSIS — Z51.5 ENCOUNTER FOR PALLIATIVE CARE: ICD-10-CM

## 2022-11-15 DIAGNOSIS — D64.9 ANEMIA, UNSPECIFIED: ICD-10-CM

## 2022-11-15 DIAGNOSIS — N17.0 ACUTE KIDNEY FAILURE WITH TUBULAR NECROSIS: ICD-10-CM

## 2022-11-15 DIAGNOSIS — E11.22 TYPE 2 DIABETES MELLITUS WITH DIABETIC CHRONIC KIDNEY DISEASE: ICD-10-CM

## 2022-11-15 DIAGNOSIS — J96.01 ACUTE RESPIRATORY FAILURE WITH HYPOXIA: ICD-10-CM

## 2022-11-15 DIAGNOSIS — Z66 DO NOT RESUSCITATE: ICD-10-CM

## 2022-11-15 DIAGNOSIS — N18.30 CHRONIC KIDNEY DISEASE, STAGE 3 UNSPECIFIED: ICD-10-CM

## 2022-11-15 DIAGNOSIS — E78.5 HYPERLIPIDEMIA, UNSPECIFIED: ICD-10-CM

## 2022-11-15 DIAGNOSIS — J44.9 CHRONIC OBSTRUCTIVE PULMONARY DISEASE, UNSPECIFIED: ICD-10-CM

## 2022-11-15 DIAGNOSIS — E86.0 DEHYDRATION: ICD-10-CM

## 2024-03-26 NOTE — DIETITIAN INITIAL EVALUATION ADULT - ADD RECOMMEND
1. Monitor PO intake/appetite, GI distress, diet tolerance, labs, weights.  2. Honor pt food preferences as able.  3. MVI.   4. RD to remain available for additional nutrition interventions as needed.   * High Nutrition Risk. 
tenderness.   Musculoskeletal:      Right forearm: Normal.      Left forearm: Normal.      Right wrist: Normal.      Left wrist: Normal.      Right hand: Normal.      Left hand: Normal.      Cervical back: Spasms and tenderness present. Decreased range of motion (with rotations, flexion).      Thoracic back: Normal.      Lumbar back: Normal.      Right lower leg: No edema.      Left lower leg: No edema.   Skin:     General: Skin is warm and dry.   Neurological:      Mental Status: He is alert and oriented to person, place, and time.   Psychiatric:         Attention and Perception: Attention and perception normal.         Mood and Affect: Mood and affect normal.         Speech: Speech normal.         Behavior: Behavior normal. Behavior is cooperative.         Thought Content: Thought content normal.         Cognition and Memory: Cognition and memory normal.         Judgment: Judgment normal.       /86 (Site: Left Upper Arm, Position: Sitting, Cuff Size: Large Adult)   Pulse 80   Ht 1.753 m (5' 9\")   Wt 111.1 kg (245 lb)   BMI 36.18 kg/m²     Assessment:       Diagnosis Orders   1. Mixed hyperlipidemia        2. Migraine without aura and without status migrainosus, not intractable        3. Gastroesophageal reflux disease without esophagitis        4. Cervical radiculopathy  XR CERVICAL SPINE (2-3 VIEWS)    predniSONE (DELTASONE) 20 MG tablet      5. Cervical pain (neck)  XR CERVICAL SPINE (2-3 VIEWS)    predniSONE (DELTASONE) 20 MG tablet      6. History of broken nose  CT SINUS WO CONTRAST      7. Snoring  CT SINUS WO CONTRAST                Plan:      Health maintenance reviewed    Hyperlipidemia - uncontrolled, recent Be Well labs reviewed. Discussed medication with patient. Declines. Wants to try diet and exercise. Low fat, low sodium diet. Daily physical activity 30 minutes/day (150 minutes/week)    Migraines - continue medication as already taking. Denies need for refill.     GERD - continue

## 2024-04-11 NOTE — PATIENT PROFILE ADULT - LANGUAGE ASSISTANCE NEEDED
Patient reports taking vitamin B PTA (folic acid documented per H&P Home Medications). Yes-Patient/Caregiver accepts free interpretation services...

## 2024-08-21 NOTE — ED ADULT TRIAGE NOTE - HEIGHT IN INCHES
Call made to patient as a post-tx touch point. Patient states he is not feeling great. Patient went to ER following treatment, care team was aware. States he is still having right sided chest pain, especially breathing in deep, laying down, or coughing. Pain was assessed at ER, did EKG and observation. Fatigue and muscle pain (entire body- legs, neck, back) are also experienced. At first, patient was upset that his neighbor called for him to go to the ER, but patient stated understanding that it was needed. Provider to be updated, patient sees Pall care next week, and is on our schedule for the following week. Patient has no current needs at this time. Patient reminded to seek care if pain worsens or he needs immediate intervention at end of call.  
1

## 2025-04-29 NOTE — ED PROVIDER NOTE - EKG #1 DATE/TIME
04/29/25 0442   Adult Attendant Initiation Algorithm   Harm Category suicide/self-harm   Suicide/Self-Harm high risk for suicide (provider)   Indicated by provider order   Types of Special Monitoring continuous      27-Sep-2022 21:01